# Patient Record
Sex: FEMALE | Race: WHITE | NOT HISPANIC OR LATINO | Employment: UNEMPLOYED | URBAN - METROPOLITAN AREA
[De-identification: names, ages, dates, MRNs, and addresses within clinical notes are randomized per-mention and may not be internally consistent; named-entity substitution may affect disease eponyms.]

---
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
## 2023-01-03 ENCOUNTER — TELEPHONE (OUTPATIENT)
Dept: OBGYN CLINIC | Facility: HOSPITAL | Age: 18
End: 2023-01-03

## 2023-01-03 NOTE — TELEPHONE ENCOUNTER
Caller: Patient    Doctor/Office: Podiatry    Call regarding :  Podiatry     Call was transferred to: [phone]

## 2023-01-05 ENCOUNTER — APPOINTMENT (OUTPATIENT)
Dept: RADIOLOGY | Facility: MEDICAL CENTER | Age: 18
End: 2023-01-05

## 2023-01-05 ENCOUNTER — OFFICE VISIT (OUTPATIENT)
Dept: PODIATRY | Facility: CLINIC | Age: 18
End: 2023-01-05

## 2023-01-05 VITALS
DIASTOLIC BLOOD PRESSURE: 86 MMHG | HEART RATE: 106 BPM | SYSTOLIC BLOOD PRESSURE: 121 MMHG | HEIGHT: 62 IN | BODY MASS INDEX: 49.02 KG/M2 | WEIGHT: 266.4 LBS

## 2023-01-05 DIAGNOSIS — G89.18 POST-OP PAIN: ICD-10-CM

## 2023-01-05 DIAGNOSIS — M79.672 LEFT FOOT PAIN: Primary | ICD-10-CM

## 2023-01-05 RX ORDER — BUSPIRONE HYDROCHLORIDE 10 MG/1
10 TABLET ORAL 3 TIMES DAILY
COMMUNITY
Start: 2022-11-28

## 2023-01-05 RX ORDER — TRAZODONE HYDROCHLORIDE 100 MG/1
100-200 TABLET ORAL
COMMUNITY
Start: 2022-12-20

## 2023-01-05 NOTE — LETTER
January 6, 2023     Patient: Pat Thomas  YOB: 2005  Date of Visit: 1/5/2023      To Whom it May Concern:    Pat Thomas is under my professional care  Migue Bradley was seen in my office on 1/5/2023  Migue Bradley will be out of school until further notice  If you have any questions or concerns, please don't hesitate to call           Sincerely,          Santi Osorio DPM        CC: No Recipients

## 2023-01-05 NOTE — PROGRESS NOTES
Assessment/Plan:    No problem-specific Assessment & Plan notes found for this encounter  Diagnoses and all orders for this visit:    Left foot pain  -     X-ray foot left 3+ views    Post-op pain    Other orders  -     busPIRone (BUSPAR) 10 mg tablet; Take 10 mg by mouth 3 (three) times a day  -     traZODone (DESYREL) 100 mg tablet; Take 100-200 mg by mouth daily at bedtime        -X-rays 3 views taken reviewed of the left foot weightbearing show no loss of fixation but there is continued gap which of the first, second intermetatarsal area  -She is having severe pain and is 3 months status post removal of hardware and stabilization of the intercuneiform area, there is no bony bridging in the intercuneiform area whatsoever  - I will send her for second opinion with derrek Turner and I believe that the neck step in her treatment would be the addition of a bone stimulatord to increase the consolidation intercuneiform area, if this does not work over the next couple months, we may need to attempt revisional fusion of the intercuneiform area  Subjective:      Patient ID: Janet Hall is a 21 y o  female  Patient states for evaluation and management of her left foot pain  She states that she is going to have issues graduating because she has difficulty learning and has run out of time at home  She is having severe pain with ambulation, she presents today with a cam boot and use of a cane  She is complaining of pain in between her first and second metatarsal       The following portions of the patient's history were reviewed and updated as appropriate: allergies, current medications, past family history, past medical history, past social history, past surgical history and problem list     Review of Systems   Constitutional: Negative for chills and fever  HENT: Negative for ear pain and sore throat  Eyes: Negative for pain and visual disturbance     Respiratory: Negative for cough and shortness of breath  Cardiovascular: Negative for chest pain and palpitations  Gastrointestinal: Negative for abdominal pain and vomiting  Genitourinary: Negative for dysuria and hematuria  Musculoskeletal: Negative for arthralgias and back pain  Skin: Negative for color change and rash  Neurological: Negative for seizures and syncope  All other systems reviewed and are negative  Objective:      /86 (BP Location: Left arm, Patient Position: Sitting, Cuff Size: Adult)   Pulse (!) 106   Ht 5' 2" (1 575 m)   Wt 121 kg (266 lb 6 4 oz)   BMI 48 73 kg/m²          Physical Exam  Vitals reviewed  Constitutional:       Appearance: Normal appearance  She is obese  HENT:      Head: Normocephalic and atraumatic  Mouth/Throat:      Mouth: Mucous membranes are moist    Eyes:      Pupils: Pupils are equal, round, and reactive to light  Cardiovascular:      Pulses: Normal pulses  Pulmonary:      Effort: Pulmonary effort is normal    Musculoskeletal:         General: Swelling and tenderness present  Comments: There is palpation in the intercuneiform area and there is continued swelling of this area as well  She has pain with palpation between the medial and lateral cuneiform and also between the first and second metatarsal    Skin:     Capillary Refill: Capillary refill takes less than 2 seconds  Neurological:      General: No focal deficit present  Mental Status: She is alert and oriented to person, place, and time  Mental status is at baseline

## 2023-01-06 ENCOUNTER — TELEPHONE (OUTPATIENT)
Dept: PODIATRY | Facility: CLINIC | Age: 18
End: 2023-01-06

## 2023-01-06 NOTE — TELEPHONE ENCOUNTER
Caller: Patient    Doctor: Gracy Teran    Reason for call: Needs new school note saying that she is not to go to school  till further notice      Call back#: [phone]  Note will be picked up by parent

## 2023-02-07 ENCOUNTER — OFFICE VISIT (OUTPATIENT)
Dept: PODIATRY | Facility: CLINIC | Age: 18
End: 2023-02-07

## 2023-02-07 VITALS — BODY MASS INDEX: 48.95 KG/M2 | HEIGHT: 62 IN | WEIGHT: 266 LBS

## 2023-02-07 DIAGNOSIS — G89.18 POST-OP PAIN: Primary | ICD-10-CM

## 2023-02-07 DIAGNOSIS — M79.672 PAIN IN LEFT FOOT: ICD-10-CM

## 2023-02-07 RX ORDER — VILAZODONE HYDROCHLORIDE 20 MG/1
20 TABLET ORAL DAILY
COMMUNITY
Start: 2023-01-17

## 2023-02-07 NOTE — PROGRESS NOTES
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      PLAN:  · I reviewed clinical exam and radiographic imaging (XR) with patient in detail today  I have discussed with the patient the pathophysiology of this diagnosis and reviewed how the examination correlates with this diagnosis  · I agree with Dr Alis Galeano of intercuneiform delayed union  I also agree with pursuing bone stimulator to assist with osseous bridging at this time prior to attempting revision surgery  · I additionally recommended arch binder and tall CAM boot to further stabilize and offload the area  · F/u with Dr Nura Chambers in one week      Subjective:      Patient ID: Brian Bello is a 21 y o  female  Patient presents for left foot pain after lisfranc injury s/p multiple surgeries  ORIF then [facility] then fusion by Dr Nura Chambers  Notes that she has pain with ambulation located btwn the 1/2 metatarsals  Has been walking in low CAM boot with cane  Patient presents for 2nd opinion on treatment  The following portions of the patient's history were reviewed and updated as appropriate: allergies, current medications, past family history, past medical history, past social history, past surgical history and problem list     Review of Systems   Constitutional: Negative for chills and fever     HENT: Negative for ear pain and sore throat  Eyes: Negative for pain and visual disturbance  Respiratory: Negative for cough and shortness of breath  Cardiovascular: Negative for chest pain and palpitations  Gastrointestinal: Negative for abdominal pain and vomiting  Genitourinary: Negative for dysuria and hematuria  Musculoskeletal: Negative for arthralgias and back pain  Skin: Negative for color change and rash  Neurological: Negative for seizures and syncope  All other systems reviewed and are negative  Objective:      Ht 5' 2" (1 575 m)   Wt 121 kg (266 lb)   BMI 48 65 kg/m²          Physical Exam  Vitals reviewed  Constitutional:       Appearance: Normal appearance  She is obese  HENT:      Head: Normocephalic and atraumatic  Mouth/Throat:      Mouth: Mucous membranes are moist    Eyes:      Pupils: Pupils are equal, round, and reactive to light  Cardiovascular:      Pulses: Normal pulses  Pulmonary:      Effort: Pulmonary effort is normal    Musculoskeletal:         General: Swelling and tenderness (Dorsal left foot over 1/2 TMTJ  ) present  Comments: There is pain with ROM 1st and 2nd rays at space btwn medial and intermediate cuneiforms  Skin:     Capillary Refill: Capillary refill takes less than 2 seconds  Neurological:      General: No focal deficit present  Mental Status: She is alert and oriented to person, place, and time  Mental status is at baseline

## 2023-02-13 ENCOUNTER — OFFICE VISIT (OUTPATIENT)
Dept: PODIATRY | Facility: CLINIC | Age: 18
End: 2023-02-13

## 2023-02-13 ENCOUNTER — APPOINTMENT (OUTPATIENT)
Dept: RADIOLOGY | Facility: MEDICAL CENTER | Age: 18
End: 2023-02-13

## 2023-02-13 VITALS — HEIGHT: 62 IN | WEIGHT: 266 LBS | BODY MASS INDEX: 48.95 KG/M2

## 2023-02-13 DIAGNOSIS — M79.672 PAIN IN LEFT FOOT: ICD-10-CM

## 2023-02-13 DIAGNOSIS — S92.902K: ICD-10-CM

## 2023-02-13 DIAGNOSIS — M79.672 PAIN IN LEFT FOOT: Primary | ICD-10-CM

## 2023-02-13 NOTE — PROGRESS NOTES
Assessment/Plan:    No problem-specific Assessment & Plan notes found for this encounter  Diagnoses and all orders for this visit:    Pain in left foot  -     X-ray foot left 3+ views; Future  -     Osteogenesic Stimulator    Fracture, foot, left, with nonunion, subsequent encounter  -     Osteogenesic Stimulator      -X-rays 3 views taken reviewed of the left foot and do show intact fixation and good fusion across first TMTJ and second TMT J  There is continued gap which is along the intercuneiform area with appearance of mild osseous bridging between the first and second metatarsal  - From since the purposes she has had a nonunion of her intercuneiform area, we will obtain a bone stimulator due to the continued intercuneiform instability, she is to continue cam boot, continue cane  - We will attempt to fuse is conservative with bone stimulator  - She is continuing to smoke, advised cessation, and we will have to attempt a repeat fusion intercuneiform area if she does not have decreased pain and consolidation with use of bone stimulator    Subjective:      Patient ID: Oswald William is a 21 y o  female  Patient Amy Rivera for evaluation management of her continued left foot pain, she is multiple months status post Lisfranc intervention with 3 surgical interventions including removal of hardware with attempted intercuneiform fusion  She presents today in boot with ambulation with cane  Having severe pain with ambulation unable to walk without boot      The following portions of the patient's history were reviewed and updated as appropriate: allergies, current medications, past family history, past medical history, past social history, past surgical history and problem list     Review of Systems   Constitutional: Negative for chills and fever  HENT: Negative for ear pain and sore throat  Eyes: Negative for pain and visual disturbance  Respiratory: Negative for cough and shortness of breath  Cardiovascular: Negative for chest pain and palpitations  Gastrointestinal: Negative for abdominal pain and vomiting  Genitourinary: Negative for dysuria and hematuria  Musculoskeletal: Negative for arthralgias and back pain  Skin: Negative for color change and rash  Neurological: Negative for seizures and syncope  All other systems reviewed and are negative  Objective:      Ht 5' 2" (1 575 m)   Wt 121 kg (266 lb)   BMI 48 65 kg/m²          Physical Exam  Vitals reviewed  Constitutional:       Appearance: Normal appearance  She is obese  HENT:      Head: Normocephalic and atraumatic  Nose: Nose normal       Mouth/Throat:      Mouth: Mucous membranes are moist    Eyes:      Pupils: Pupils are equal, round, and reactive to light  Cardiovascular:      Pulses: Normal pulses  Pulmonary:      Effort: Pulmonary effort is normal    Musculoskeletal:         General: Swelling and tenderness present  Comments: TTP with ROM of the medial column  Tenderness palpation along the cuneiform area  Skin:     Capillary Refill: Capillary refill takes less than 2 seconds  Neurological:      General: No focal deficit present  Mental Status: She is alert and oriented to person, place, and time  Mental status is at baseline

## 2023-02-28 ENCOUNTER — TELEPHONE (OUTPATIENT)
Dept: PAIN MEDICINE | Facility: CLINIC | Age: 18
End: 2023-02-28

## 2023-03-26 ENCOUNTER — HOSPITAL ENCOUNTER (EMERGENCY)
Facility: HOSPITAL | Age: 18
Discharge: HOME | End: 2023-03-26
Attending: EMERGENCY MEDICINE

## 2023-03-26 ENCOUNTER — APPOINTMENT (EMERGENCY)
Dept: RADIOLOGY | Facility: HOSPITAL | Age: 18
End: 2023-03-26

## 2023-03-26 VITALS
SYSTOLIC BLOOD PRESSURE: 122 MMHG | BODY MASS INDEX: 48.79 KG/M2 | RESPIRATION RATE: 26 BRPM | DIASTOLIC BLOOD PRESSURE: 63 MMHG | TEMPERATURE: 97.6 F | OXYGEN SATURATION: 99 % | WEIGHT: 266.76 LBS | HEART RATE: 89 BPM

## 2023-03-26 DIAGNOSIS — J45.901 ACUTE ASTHMA EXACERBATION: Primary | ICD-10-CM

## 2023-03-26 LAB
ALBUMIN SERPL BCP-MCNC: 4.1 G/DL (ref 3.5–5)
ALP SERPL-CCNC: 57 U/L (ref 34–104)
ALT SERPL W P-5'-P-CCNC: 15 U/L (ref 7–52)
ANION GAP SERPL CALCULATED.3IONS-SCNC: 11 MMOL/L (ref 4–13)
AST SERPL W P-5'-P-CCNC: 14 U/L (ref 13–39)
BASOPHILS # BLD AUTO: 0.05 THOUSANDS/ÂΜL (ref 0–0.1)
BASOPHILS NFR BLD AUTO: 1 % (ref 0–1)
BILIRUB SERPL-MCNC: 0.27 MG/DL (ref 0.2–1)
BUN SERPL-MCNC: 15 MG/DL (ref 5–25)
CALCIUM SERPL-MCNC: 9.3 MG/DL (ref 8.4–10.2)
CARDIAC TROPONIN I PNL SERPL HS: <2 NG/L (ref 8–18)
CHLORIDE SERPL-SCNC: 103 MMOL/L (ref 96–108)
CO2 SERPL-SCNC: 24 MMOL/L (ref 21–32)
CREAT SERPL-MCNC: 0.8 MG/DL (ref 0.6–1.3)
D DIMER PPP FEU-MCNC: 0.37 UG/ML FEU
EOSINOPHIL # BLD AUTO: 0.09 THOUSAND/ÂΜL (ref 0–0.61)
EOSINOPHIL NFR BLD AUTO: 1 % (ref 0–6)
ERYTHROCYTE [DISTWIDTH] IN BLOOD BY AUTOMATED COUNT: 13 % (ref 11.6–15.1)
GFR SERPL CREATININE-BSD FRML MDRD: 107 ML/MIN/1.73SQ M
GLUCOSE SERPL-MCNC: 103 MG/DL (ref 65–140)
HCG SERPL QL: NEGATIVE
HCT VFR BLD AUTO: 43.8 % (ref 34.8–46.1)
HGB BLD-MCNC: 14.8 G/DL (ref 11.5–15.4)
IMM GRANULOCYTES # BLD AUTO: 0.03 THOUSAND/UL (ref 0–0.2)
IMM GRANULOCYTES NFR BLD AUTO: 0 % (ref 0–2)
LYMPHOCYTES # BLD AUTO: 3.08 THOUSANDS/ÂΜL (ref 0.6–4.47)
LYMPHOCYTES NFR BLD AUTO: 33 % (ref 14–44)
MCH RBC QN AUTO: 29.7 PG (ref 26.8–34.3)
MCHC RBC AUTO-ENTMCNC: 33.8 G/DL (ref 31.4–37.4)
MCV RBC AUTO: 88 FL (ref 82–98)
MONOCYTES # BLD AUTO: 0.67 THOUSAND/ÂΜL (ref 0.17–1.22)
MONOCYTES NFR BLD AUTO: 7 % (ref 4–12)
NEUTROPHILS # BLD AUTO: 5.35 THOUSANDS/ÂΜL (ref 1.85–7.62)
NEUTS SEG NFR BLD AUTO: 58 % (ref 43–75)
NRBC BLD AUTO-RTO: 0 /100 WBCS
PLATELET # BLD AUTO: 371 THOUSANDS/UL (ref 149–390)
PMV BLD AUTO: 9.7 FL (ref 8.9–12.7)
POTASSIUM SERPL-SCNC: 3.5 MMOL/L (ref 3.5–5.3)
PROT SERPL-MCNC: 7.2 G/DL (ref 6.4–8.4)
RBC # BLD AUTO: 4.98 MILLION/UL (ref 3.81–5.12)
SODIUM SERPL-SCNC: 138 MMOL/L (ref 135–147)
WBC # BLD AUTO: 9.27 THOUSAND/UL (ref 4.31–10.16)

## 2023-03-26 RX ORDER — PREDNISONE 20 MG/1
40 TABLET ORAL DAILY
Qty: 8 TABLET | Refills: 0 | Status: SHIPPED | OUTPATIENT
Start: 2023-03-27 | End: 2023-03-31

## 2023-03-26 RX ORDER — SODIUM CHLORIDE FOR INHALATION 0.9 %
3 VIAL, NEBULIZER (ML) INHALATION ONCE
Status: COMPLETED | OUTPATIENT
Start: 2023-03-26 | End: 2023-03-26

## 2023-03-26 RX ORDER — PREDNISONE 20 MG/1
40 TABLET ORAL ONCE
Status: COMPLETED | OUTPATIENT
Start: 2023-03-26 | End: 2023-03-26

## 2023-03-26 RX ADMIN — IPRATROPIUM BROMIDE 1 MG: 0.5 SOLUTION RESPIRATORY (INHALATION) at 03:43

## 2023-03-26 RX ADMIN — Medication 3 ML: at 03:43

## 2023-03-26 RX ADMIN — ALBUTEROL SULFATE 10 MG: 2.5 SOLUTION RESPIRATORY (INHALATION) at 03:43

## 2023-03-26 RX ADMIN — IPRATROPIUM BROMIDE: 0.5 SOLUTION RESPIRATORY (INHALATION) at 03:43

## 2023-03-26 RX ADMIN — PREDNISONE 40 MG: 20 TABLET ORAL at 04:55

## 2023-03-26 NOTE — DISCHARGE INSTRUCTIONS
You have been seen for dyspnea and chest discomfort  Please complete the course of prednisone as prescribed  Return to the emergency department if you develop worsening trouble breathing, chest pain or any other symptoms of concern  Please follow up with your PCP by calling the number provided

## 2023-03-26 NOTE — ED PROVIDER NOTES
History  Chief Complaint   Patient presents with   • Asthma     Pt c/o sob and chest pain, states she took her prn inhalers with no relief  States she coughed up a quarter size blood clot before coming in  [facility] Harry is a 21y o  year old female with PMH of asthma presenting to the [facility] ED for dyspnea and chest discomfort  Patient states one hour prior to arrival she had a cough and coughed up a quarter sized blood clot  Since that time, reporting generalized chest discomfort described as a squeezing sensation  Feels short of breath as well  Patient has history of asthma though feels that her current symptoms are different compared to prior asthma exacerbations  Patient reportedly in normal state of health yesterday  No reported fevers, cough or congestion  No reported sore throat, neck swelling or trouble swallowing  No history of hospitalization or intubation for asthma exacerbation previously  Patient trialed her albuterol inhaler for relief of symptoms prior to arrival  Patient denies personal or family history of DVT/PE  Denies unilateral leg pain/swelling  Not currently on estrogen-containing contraceptive medication  No recent travel  History provided by:  Patient and medical records   used: No    Asthma  Associated symptoms: chest pain, cough and shortness of breath    Associated symptoms: no abdominal pain, no congestion, no diarrhea, no fever, no nausea and no vomiting        Prior to Admission Medications   Prescriptions Last Dose Informant Patient Reported? Taking?    albuterol (Ventolin HFA) 90 mcg/act inhaler   No No   Sig: Inhale 2 puffs every 4 (four) hours as needed for wheezing or shortness of breath   busPIRone (BUSPAR) 10 mg tablet   Yes No   Sig: Take 10 mg by mouth 3 (three) times a day   medroxyPROGESTERone (DEPO-PROVERA) 150 mg/mL injection   No No   Sig: Inject 1 mL (150 mg total) into a muscle every 3 (three) months   Patient not taking: Reported on 12/5/2022   prazosin (MINIPRESS) 5 mg capsule   No No   Sig: Take 1 capsule (5 mg total) by mouth daily at bedtime   traZODone (DESYREL) 100 mg tablet   Yes No   Sig: Take 100-200 mg by mouth daily at bedtime   vilazodone (VIIBRYD) 20 mg tablet   Yes No   Sig: Take 20 mg by mouth daily      Facility-Administered Medications Last Administration Doses Remaining   medroxyPROGESTERone (DEPO-PROVERA) IM injection 150 mg None recorded 1          Past Medical History:   Diagnosis Date   • ADHD (attention deficit hyperactivity disorder)    • Anxiety    • Asthma    • Depression    • Environmental allergies    • PONV (postoperative nausea and vomiting)    • PTSD (post-traumatic stress disorder)        Past Surgical History:   Procedure Laterality Date   • MD ARTHRD MIDTARSL/TARSOMETATARSAL MULT/TRANSVRS Left 10/13/2022    Procedure: ARTHRODESIS / FUSION FOOT;  Surgeon: Deon Blackburn DPM;  Location: [facility];  Service: Podiatry   • MD OPEN TREATMENT TARSOMETATARSAL JOINT DISLOCATION Left 5/6/2022    Procedure: ORIF OF LEFT FOOT UTILIZING PLATES AND SCREW AS NECESSARY;  Surgeon: Deon Blackburn DPM;  Location: [facility];  Service: Podiatry   • MD REMOVAL IMPLANT DEEP Left 7/28/2022    Procedure: REMOVAL HARDWARE FOOT;  Surgeon: Deon Blackburn DPM;  Location: [facility];  Service: Podiatry       Family History   Problem Relation Age of Onset   • Autism Brother    • Diabetes type II Maternal Grandmother      I have reviewed and agree with the history as documented      E-Cigarette/Vaping   • E-Cigarette Use Current Some Day User      E-Cigarette/Vaping Substances   • Nicotine Yes    • THC No    • CBD No    • Flavoring Yes    • Other No    • Unknown No      Social History     Tobacco Use   • Smoking status: Every Day     Packs/day: 0 50     Types: Cigarettes   • Smokeless tobacco: Never   • Tobacco comments:     cutting back   Vaping Use   • Vaping Use: Some days   • Substances: Nicotine, Flavoring   Substance Use Topics   • Alcohol use: Not Currently   • Drug use: Not Currently       Review of Systems   Constitutional: Negative for chills and fever  HENT: Negative for congestion  Respiratory: Positive for cough, chest tightness and shortness of breath  Cardiovascular: Positive for chest pain  Gastrointestinal: Negative for abdominal pain, diarrhea, nausea and vomiting  Musculoskeletal: Negative for back pain  All other systems reviewed and are negative  Physical Exam  Physical Exam  Vitals and nursing note reviewed  Constitutional:       General: She is not in acute distress  Appearance: Normal appearance  She is well-developed  She is obese  She is not ill-appearing, toxic-appearing or diaphoretic  HENT:      Head: Normocephalic and atraumatic  Jaw: No trismus, tenderness or swelling  Nose: No congestion or rhinorrhea  Mouth/Throat:      Pharynx: Uvula midline  No pharyngeal swelling, oropharyngeal exudate, posterior oropharyngeal erythema or uvula swelling  Eyes:      General:         Right eye: No discharge  Left eye: No discharge  Cardiovascular:      Rate and Rhythm: Normal rate and regular rhythm  Pulmonary:      Effort: Tachypnea present  No accessory muscle usage or respiratory distress  Breath sounds: Normal breath sounds  Decreased air movement (grossly diminished) present  No stridor  No decreased breath sounds, wheezing, rhonchi or rales  Comments: Speaking in full sentences  Abdominal:      General: Bowel sounds are normal  There is no distension  Palpations: Abdomen is soft  Tenderness: There is no abdominal tenderness  There is no guarding or rebound  Musculoskeletal:      Cervical back: Normal range of motion and neck supple  No edema, erythema, rigidity or crepitus  No muscular tenderness  Right lower leg: No tenderness  No edema  Left lower leg: No tenderness  No edema     Skin:     Capillary Refill: Capillary refill takes less than 2 seconds  Findings: No rash  Neurological:      Mental Status: She is alert and oriented to person, place, and time     Psychiatric:         Mood and Affect: Mood normal          Behavior: Behavior normal          Vital Signs  ED Triage Vitals [03/26/23 0305]   Temperature Pulse Respirations Blood Pressure SpO2   97 6 °F (36 4 °C) 97 (!) 24 105/53 98 %      Temp Source Heart Rate Source Patient Position - Orthostatic VS BP Location FiO2 (%)   Temporal Monitor Lying Left arm --      Pain Score       --           Vitals:    03/26/23 0305 03/26/23 0400 03/26/23 0430   BP: 105/53 169/63 122/63   Pulse: 97 82 89   Patient Position - Orthostatic VS: Lying Lying Lying         Visual Acuity      ED Medications  Medications   albuterol inhalation solution 10 mg (10 mg Nebulization Given 3/26/23 0343)     And   ipratropium (ATROVENT) 0 02 % inhalation solution 1 mg (1 mg Nebulization Given 3/26/23 0343)     And   sodium chloride 0 9 % inhalation solution 3 mL (3 mL Nebulization Given 3/26/23 0343)   ipratropium (ATROVENT) 0 02 % inhalation solution **ADS Override Pull** (  Given 3/26/23 0343)   predniSONE tablet 40 mg (40 mg Oral Given 3/26/23 0455)       Diagnostic Studies  Results Reviewed     Procedure Component Value Units Date/Time    High Sensitivity Troponin I Random [[id number]]  (Abnormal) Collected: 03/26/23 0331    Lab Status: Final result Specimen: Blood from Arm, Right Updated: 03/26/23 0401     HS TnI random <2 ng/L     hCG, qualitative pregnancy [[id number]]  (Normal) Collected: 03/26/23 0331    Lab Status: Final result Specimen: Blood from Arm, Right Updated: 03/26/23 0400     Preg, Serum Negative    Comprehensive metabolic panel [[id number]] Collected: 03/26/23 0331    Lab Status: Final result Specimen: Blood from Arm, Right Updated: 03/26/23 0356     Sodium 138 mmol/L      Potassium 3 5 mmol/L      Chloride 103 mmol/L      CO2 24 mmol/L      ANION GAP 11 mmol/L BUN 15 mg/dL      Creatinine 0 80 mg/dL      Glucose 103 mg/dL      Calcium 9 3 mg/dL      AST 14 U/L      ALT 15 U/L      Alkaline Phosphatase 57 U/L      Total Protein 7 2 g/dL      Albumin 4 1 g/dL      Total Bilirubin 0 27 mg/dL      eGFR 107 ml/min/1 73sq m     Narrative:      [place] guidelines for Chronic Kidney Disease (CKD):   •  Stage 1 with normal or high GFR (GFR > 90 mL/min/1 73 square meters)  •  Stage 2 Mild CKD (GFR = 60-89 mL/min/1 73 square meters)  •  Stage 3A Moderate CKD (GFR = 45-59 mL/min/1 73 square meters)  •  Stage 3B Moderate CKD (GFR = 30-44 mL/min/1 73 square meters)  •  Stage 4 Severe CKD (GFR = 15-29 mL/min/1 73 square meters)  •  Stage 5 End Stage CKD (GFR <15 mL/min/1 73 square meters)  Note: GFR calculation is accurate only with a steady state creatinine    D-Dimer [[id number]]  (Normal) Collected: 03/26/23 0331    Lab Status: Final result Specimen: Blood from Arm, Right Updated: 03/26/23 0350     D-Dimer, Quant 0 37 ug/ml FEU     CBC and differential [[id number]] Collected: 03/26/23 0331    Lab Status: Final result Specimen: Blood from Arm, Right Updated: 03/26/23 0338     WBC 9 27 Thousand/uL      RBC 4 98 Million/uL      Hemoglobin 14 8 g/dL      Hematocrit 43 8 %      MCV 88 fL      MCH 29 7 pg      MCHC 33 8 g/dL      RDW 13 0 %      MPV 9 7 fL      Platelets 290 Thousands/uL      nRBC 0 /100 WBCs      Neutrophils Relative 58 %      Immat GRANS % 0 %      Lymphocytes Relative 33 %      Monocytes Relative 7 %      Eosinophils Relative 1 %      Basophils Relative 1 %      Neutrophils Absolute 5 35 Thousands/µL      Immature Grans Absolute 0 03 Thousand/uL      Lymphocytes Absolute 3 08 Thousands/µL      Monocytes Absolute 0 67 Thousand/µL      Eosinophils Absolute 0 09 Thousand/µL      Basophils Absolute 0 05 Thousands/µL                  XR chest portable   ED Interpretation by Sonny Monday, DO (03/26 0421)   No acute cardiopulmonary disease  "             Procedures  Procedures         ED Course  ED Course as of 03/26/23 0636   Gwenetta Severs Mar 26, 2023   8811 Procedure Note: EKG  Date/Time: 03/26/23 3:03 AM   Interpreted by: Luci Johnson DO  Indications / Diagnosis: Dyspnea  ECG reviewed by me, the ED Provider: yes   The EKG demonstrates:  Rhythm: normal sinus rhythm 94 BPM  Intervals: Normal MO and QT intervals  Axis: Normal axis  QRS/Blocks: Normal QRS  ST Changes: No acute ST/T waves changes  No BHARATH  No TWI    0451 Patient reassessed  She feels much better  Chest discomfort resolved  No wheezing on repeat auscultation, improved air movement  Vital signs are stable  Reviewed work-up with patient  We will plan for discharge and treatment for asthma exacerbation  CRAFFT    Flowsheet Row Most Recent Value   SBIRT (13-23 yo)    In order to provide better care to our patients, we are screening all of our patients for alcohol and drug use  Would it be okay to ask you these screening questions? Yes Filed at: 03/26/2023 0411   LUNA Initial Screen: During the past 12 months, did you:    1  Drink any alcohol (more than a few sips)? No Filed at: 03/26/2023 0411   2  Smoke any marijuana or hashish No Filed at: 03/26/2023 0411   3  Use anything else to get high? (\"anything else\" includes illegal drugs, over the counter and prescription drugs, and things that you sniff or 'dale')? No Filed at: 03/26/2023 0411                                          Medical Decision Making    21 y o  female presenting for cough, chest tightness and dyspnea  Normal oxygen saturation, mildly tachypneic though speaking in full sentences  Grossly diminished breath sounds bilaterally without overt wheezing still possibly consistent with asthma exacerbation  Will treat with DuoNeb and prednisone  Will order labs, chest x-ray to evaluate for pneumothorax, myocarditis, pulmonary embolism, arrhythmia or ACS      Reassessment: Patient feeling much better after DuoNeb " treatment  No wheezing on repeat auscultation  Much improved air movement  Reviewed labs and preliminary x-ray results with the patient  Unclear etiology of cough, possibly bloody sputum  Disposition: I have discussed with the patient our plan to discharge them from the ED and the patient is in agreement with this plan  Discharge Plan: Prescription for prednisone, continue albuterol inhaler as needed home  RTED precautions emphasized  The patient was provided a written after visit summary with strict RTED precautions  Followup: I have discussed with the patient plan to follow up with their PCP  Contact information provided in AVS     Amount and/or Complexity of Data Reviewed  Labs: ordered  Radiology: ordered and independent interpretation performed  Risk  Prescription drug management  Disposition  Final diagnoses:   Acute asthma exacerbation     Time reflects when diagnosis was documented in both MDM as applicable and the Disposition within this note     Time User Action Codes Description Comment    3/26/2023  4:51 AM Ann Lynn [[id number]] Acute asthma exacerbation       ED Disposition     ED Disposition   Discharge    Condition   Stable    Date/Time   Sun Mar 26, 2023  4:51 AM    Comment   Josephine Mckay discharge to home/self care  Follow-up Information     Follow up With Specialties Details Why [address], DO Family Medicine Schedule an appointment as soon as possible for a visit  To make appointment for reevaluation in 3-5 days   [address] [facility]  [address]  [phone]            Discharge Medication List as of 3/26/2023  4:52 AM      START taking these medications    Details   predniSONE 20 mg tablet Take 2 tablets (40 mg total) by mouth daily for 4 days Do not start before March 27, 2023 , Starting Mon 3/27/2023, Until Fri 3/31/2023, Normal         CONTINUE these medications which have NOT CHANGED    Details   albuterol (Ventolin HFA) 90 mcg/act inhaler Inhale 2 puffs every 4 (four) hours as needed for wheezing or shortness of breath, Starting Mon 11/21/2022, Normal      busPIRone (BUSPAR) 10 mg tablet Take 10 mg by mouth 3 (three) times a day, Starting Mon 11/28/2022, Historical Med      medroxyPROGESTERone (DEPO-PROVERA) 150 mg/mL injection Inject 1 mL (150 mg total) into a muscle every 3 (three) months, Starting Tue 11/8/2022, Normal      prazosin (MINIPRESS) 5 mg capsule Take 1 capsule (5 mg total) by mouth daily at bedtime, Starting Mon 11/21/2022, Normal      traZODone (DESYREL) 100 mg tablet Take 100-200 mg by mouth daily at bedtime, Starting Tue 12/20/2022, Historical Med      vilazodone (VIIBRYD) 20 mg tablet Take 20 mg by mouth daily, Starting Tue 1/17/2023, Historical Med             No discharge procedures on file      PDMP Review       Value Time User    PDMP Reviewed  Yes 9/14/2022  3:35 PM Lyle Torres DO          ED Provider  Electronically Signed by           Jai Werner DO  03/26/23 6146

## 2023-03-27 ENCOUNTER — OFFICE VISIT (OUTPATIENT)
Dept: PODIATRY | Facility: CLINIC | Age: 18
End: 2023-03-27

## 2023-03-27 ENCOUNTER — APPOINTMENT (OUTPATIENT)
Dept: RADIOLOGY | Facility: MEDICAL CENTER | Age: 18
End: 2023-03-27

## 2023-03-27 VITALS — HEIGHT: 62 IN | BODY MASS INDEX: 48.95 KG/M2 | WEIGHT: 266 LBS

## 2023-03-27 DIAGNOSIS — M79.672 PAIN IN LEFT FOOT: Primary | ICD-10-CM

## 2023-03-27 DIAGNOSIS — M79.672 PAIN IN LEFT FOOT: ICD-10-CM

## 2023-03-27 DIAGNOSIS — S92.902K: ICD-10-CM

## 2023-03-27 LAB
ATRIAL RATE: 94 BPM
P AXIS: 31 DEGREES
PR INTERVAL: 162 MS
QRS AXIS: 24 DEGREES
QRSD INTERVAL: 92 MS
QT INTERVAL: 360 MS
QTC INTERVAL: 450 MS
T WAVE AXIS: 22 DEGREES
VENTRICULAR RATE: 94 BPM

## 2023-03-27 NOTE — PROGRESS NOTES
Assessment/Plan:    No problem-specific Assessment & Plan notes found for this encounter  Diagnoses and all orders for this visit:    Pain in left foot  -     X-ray foot left 3+ views; Future    Fracture, foot, left, with nonunion, subsequent encounter      -X-rays 3 views taken reviewed do show mild osseous bridging across the intercuneiform area, fracture gap remains less than 1 mm  - Normal postoperative appearance but elongated postoperative recovery, she may DC her boot, may DC cane, return to shoes with pain tolerance permitting  - We will attempt to call both with her company to allow use of this device for the full 3 months, with minimal osseous bridging across the intercuneiform area this would still be very beneficial for her to prevent long-term complication    Subjective:      Patient ID: Jennifer Begum is a 21 y o  female  Patient presents for evaluation management of her left foot pain, she is in cam boot and ambulating with crutch, she states that she feels fine in the boot, she is actually having minimal pain when she is outside the boot, she was able to use a bone cement for 10 days before deciding stop working  The following portions of the patient's history were reviewed and updated as appropriate: allergies, current medications, past family history, past medical history, past social history, past surgical history and problem list     Review of Systems   Constitutional: Negative for chills and fever  HENT: Negative for ear pain and sore throat  Eyes: Negative for pain and visual disturbance  Respiratory: Negative for cough and shortness of breath  Cardiovascular: Negative for chest pain and palpitations  Gastrointestinal: Negative for abdominal pain and vomiting  Genitourinary: Negative for dysuria and hematuria  Musculoskeletal: Negative for arthralgias and back pain  Skin: Negative for color change and rash  Neurological: Negative for seizures and syncope  "  All other systems reviewed and are negative  Objective:      Ht 5' 2\" (1 575 m)   Wt 121 kg (266 lb)   BMI 48 65 kg/m²          Physical Exam  Musculoskeletal:      Comments:  There is actually minimal pain along the intercuneiform area, there is pain with palpation along the screw head of the second TMT J          "

## 2023-06-21 NOTE — RESPIRATORY THERAPY NOTE
1610 called to ER for conscious sedation on pt for LT foot  Suction, ambu, capnography with 2 l/m nc placed on pt  Pulse ox 99% throughout entire procedure, RR 16-20, CO2 36-40  No complications, pt did not need any respiratory assistance  1705 pt alert, speaking, and  oxygen removed   Therapist cleared to go show

## 2023-06-22 ENCOUNTER — OFFICE VISIT (OUTPATIENT)
Dept: PODIATRY | Facility: CLINIC | Age: 18
End: 2023-06-22
Payer: COMMERCIAL

## 2023-06-22 ENCOUNTER — APPOINTMENT (OUTPATIENT)
Dept: RADIOLOGY | Facility: MEDICAL CENTER | Age: 18
End: 2023-06-22
Payer: COMMERCIAL

## 2023-06-22 VITALS
HEIGHT: 62 IN | WEIGHT: 266 LBS | HEART RATE: 93 BPM | BODY MASS INDEX: 48.95 KG/M2 | DIASTOLIC BLOOD PRESSURE: 63 MMHG | SYSTOLIC BLOOD PRESSURE: 122 MMHG

## 2023-06-22 DIAGNOSIS — M79.672 LEFT FOOT PAIN: ICD-10-CM

## 2023-06-22 DIAGNOSIS — M79.672 LEFT FOOT PAIN: Primary | ICD-10-CM

## 2023-06-22 PROCEDURE — 73630 X-RAY EXAM OF FOOT: CPT

## 2023-06-22 PROCEDURE — 99213 OFFICE O/P EST LOW 20 MIN: CPT | Performed by: PODIATRIST

## 2023-06-22 NOTE — PROGRESS NOTES
Assessment/Plan:    No problem-specific Assessment & Plan notes found for this encounter  Diagnoses and all orders for this visit:    Left foot pain  -     X-ray foot left 3+ views; Future  -     CT lower extremity wo contrast left; Future  -     Orthotics B/L      -Stomach continued pain, unfortunately she only was able to use a bone stimulator for 20 treatments before it broke, she did not receive another bone stimulator  - I have wrote him a prescription for orthotics and hopefully rigid shoes to prevent some range of motion through this painful area  - We will obtain a CT to evaluate the union of the midfoot    Subjective:      Patient ID: Sourav Flores is a 21 y o  female  Patient presents for evaluation management of left foot, she still having significant pain that is interfering with her activities of daily living, she is having difficulty finding a job that does not allow her to stand on her feet and it hurts her to stand as the day goes on more and more more, she feels as though her pain relief has maximized  She states that as though she feels a sharp stabbing pain in her foot and that it hurts for couple days afterwards  She states that she has difficulty wearing closed toed shoes and sandals with the best for her      The following portions of the patient's history were reviewed and updated as appropriate: allergies, current medications, past family history, past medical history, past social history, past surgical history and problem list     Review of Systems   Constitutional: Negative for chills and fever  HENT: Negative for ear pain and sore throat  Eyes: Negative for pain and visual disturbance  Respiratory: Negative for cough and shortness of breath  Cardiovascular: Negative for chest pain and palpitations  Gastrointestinal: Negative for abdominal pain and vomiting  Genitourinary: Negative for dysuria and hematuria     Musculoskeletal: Negative for arthralgias and back "pain    Skin: Negative for color change and rash  Neurological: Negative for seizures and syncope  All other systems reviewed and are negative  Objective:      /63 (BP Location: Right arm, Patient Position: Sitting, Cuff Size: Extra-Large)   Pulse 93   Ht 5' 2\" (1 575 m)   Wt 121 kg (266 lb)   BMI 48 65 kg/m²          Physical Exam  Musculoskeletal:      Comments:  There is actually minimal intercuneiform area pain, the pain seems to be more isolated to a prominent screw at the second TMT J and somewhat pain with range of motion of the second TMT J          "

## 2023-07-24 ENCOUNTER — HOSPITAL ENCOUNTER (OUTPATIENT)
Dept: CT IMAGING | Facility: HOSPITAL | Age: 18
Discharge: HOME | End: 2023-07-24
Attending: PODIATRIST
Payer: COMMERCIAL

## 2023-07-24 DIAGNOSIS — M79.672 LEFT FOOT PAIN: ICD-10-CM

## 2023-07-24 PROCEDURE — 73700 CT LOWER EXTREMITY W/O DYE: CPT

## 2023-07-24 PROCEDURE — G1004 CDSM NDSC: HCPCS

## 2023-07-31 ENCOUNTER — HOSPITAL ENCOUNTER (EMERGENCY)
Facility: HOSPITAL | Age: 18
End: 2023-08-01
Attending: EMERGENCY MEDICINE
Payer: COMMERCIAL

## 2023-07-31 ENCOUNTER — CLINICAL SUPPORT (OUTPATIENT)
Dept: FAMILY MEDICINE CLINIC | Facility: CLINIC | Age: 18
End: 2023-07-31
Payer: COMMERCIAL

## 2023-07-31 DIAGNOSIS — Z11.1 PPD SCREENING TEST: Primary | ICD-10-CM

## 2023-07-31 DIAGNOSIS — R45.851 SUICIDAL IDEATIONS: Primary | ICD-10-CM

## 2023-07-31 DIAGNOSIS — R45.851 DEPRESSION WITH SUICIDAL IDEATION: ICD-10-CM

## 2023-07-31 DIAGNOSIS — F32.A DEPRESSION WITH SUICIDAL IDEATION: ICD-10-CM

## 2023-07-31 LAB
AMPHETAMINES SERPL QL SCN: NEGATIVE
BARBITURATES UR QL: NEGATIVE
BENZODIAZ UR QL: NEGATIVE
COCAINE UR QL: NEGATIVE
ETHANOL EXG-MCNC: 0 MG/DL
EXT PREGNANCY TEST URINE: NEGATIVE
EXT. CONTROL: NORMAL
METHADONE UR QL: NEGATIVE
OPIATES UR QL SCN: NEGATIVE
OXYCODONE+OXYMORPHONE UR QL SCN: NEGATIVE
PCP UR QL: NEGATIVE
THC UR QL: POSITIVE

## 2023-07-31 PROCEDURE — 82075 ASSAY OF BREATH ETHANOL: CPT

## 2023-07-31 PROCEDURE — 99284 EMERGENCY DEPT VISIT MOD MDM: CPT

## 2023-07-31 PROCEDURE — 80307 DRUG TEST PRSMV CHEM ANLYZR: CPT

## 2023-07-31 PROCEDURE — 86580 TB INTRADERMAL TEST: CPT | Performed by: FAMILY MEDICINE

## 2023-07-31 PROCEDURE — 81025 URINE PREGNANCY TEST: CPT

## 2023-07-31 RX ORDER — DESVENLAFAXINE SUCCINATE 50 MG/1
50 TABLET, EXTENDED RELEASE ORAL DAILY
Status: DISCONTINUED | OUTPATIENT
Start: 2023-08-01 | End: 2023-07-31

## 2023-07-31 RX ORDER — LANOLIN ALCOHOL/MO/W.PET/CERES
6 CREAM (GRAM) TOPICAL
Status: DISCONTINUED | OUTPATIENT
Start: 2023-07-31 | End: 2023-07-31

## 2023-07-31 RX ORDER — MIRTAZAPINE 15 MG/1
30 TABLET, FILM COATED ORAL
Status: DISCONTINUED | OUTPATIENT
Start: 2023-07-31 | End: 2023-08-01 | Stop reason: HOSPADM

## 2023-07-31 RX ADMIN — MIRTAZAPINE 30 MG: 15 TABLET, FILM COATED ORAL at 21:16

## 2023-07-31 NOTE — ED NOTES
Patient okay for virtual observation and Q 15 minute checks per provider order at this time.      Deedee Marie RN  07/31/23 1955

## 2023-07-31 NOTE — ED NOTES
Patient changed into paper scrubs. Patient belongings bagged at this time.      Kobe Bell RN  07/31/23 0604

## 2023-07-31 NOTE — ED PROVIDER NOTES
History  Chief Complaint   Patient presents with   • Psychiatric Evaluation     Patient states that she "does not trust herself" and has thoughts of self harm/suicide; states that she "blacks out" and wakes up with cuts on herself; looking for inpatient treatment; denies HI     Jh Burnette is a 22-year-old female with a history of anxiety, depression, PTSD, presenting for evaluation of depression, suicidal thoughts, suicidal plan. She describes that over recent days, she has been having feelings of depression, she cannot pinpoint the reason why she is having these feelings. She also describes episodes of "blacking out" and waking with cuts on her forearms and legs. She believes she is harming herself, she has a history of prior self-harm. She describes suicidal ideations and a plan in which she would overdose on her psychiatric medications, she takes [place] and BuSpar, she has a history of prior "overdose" on Zoloft. She denies any homicidal thoughts or ideations. Denies any visual or auditory hallucinations. She admits to occasionally smoking marijuana, but denies any other drug or alcohol use. She has previous psychiatric admissions. She is requesting a 201 psychiatric admission today for these suicidal ideations. History provided by:  Patient   used: No        Prior to Admission Medications   Prescriptions Last Dose Informant Patient Reported? Taking?    albuterol (Ventolin HFA) 90 mcg/act inhaler   No No   Sig: Inhale 2 puffs every 4 (four) hours as needed for wheezing or shortness of breath   busPIRone (BUSPAR) 10 mg tablet   Yes No   Sig: Take 10 mg by mouth 3 (three) times a day   medroxyPROGESTERone (DEPO-PROVERA) 150 mg/mL injection   No No   Sig: Inject 1 mL (150 mg total) into a muscle every 3 (three) months   Patient not taking: Reported on 12/5/2022   prazosin (MINIPRESS) 5 mg capsule   No No   Sig: Take 1 capsule (5 mg total) by mouth daily at bedtime   traZODone (DESYREL) 100 mg tablet   Yes No   Sig: Take 100-200 mg by mouth daily at bedtime   vilazodone (VIIBRYD) 20 mg tablet   Yes No   Sig: Take 20 mg by mouth daily      Facility-Administered Medications: None       Past Medical History:   Diagnosis Date   • ADHD (attention deficit hyperactivity disorder)    • Anxiety    • Asthma    • Bipolar disorder (HCC)    • Depression    • Environmental allergies    • PONV (postoperative nausea and vomiting)    • PTSD (post-traumatic stress disorder)        Past Surgical History:   Procedure Laterality Date   • [place] ARTHRD MIDTARSL/TARSOMETATARSAL MULT/TRANSVRS Left 10/13/2022    Procedure: ARTHRODESIS / FUSION FOOT;  Surgeon: Azalia Agustin DPM;  Location: [facility];  Service: Podiatry   • [place] OPEN TREATMENT TARSOMETATARSAL JOINT DISLOCATION Left 5/6/2022    Procedure: ORIF OF LEFT FOOT UTILIZING PLATES AND SCREW AS NECESSARY;  Surgeon: Azalia Agustin DPM;  Location: [facility];  Service: Podiatry   • [place] REMOVAL IMPLANT DEEP Left 7/28/2022    Procedure: REMOVAL HARDWARE FOOT;  Surgeon: Azalia Agustin DPM;  Location: [facility];  Service: Podiatry       Family History   Problem Relation Age of Onset   • Autism Brother    • Diabetes type II Maternal Grandmother      I have reviewed and agree with the history as documented.     E-Cigarette/Vaping   • E-Cigarette Use Current Some Day User      E-Cigarette/Vaping Substances   • Nicotine Yes    • THC No    • CBD No    • Flavoring Yes    • Other No    • Unknown No      Social History     Tobacco Use   • Smoking status: Every Day     Packs/day: 0.50     Types: Cigarettes   • Smokeless tobacco: Never   • Tobacco comments:     cutting back   Vaping Use   • Vaping Use: Some days   • Substances: Nicotine, Flavoring   Substance Use Topics   • Alcohol use: Not Currently   • Drug use: Yes     Types: Marijuana     Comment: patient states about 1-2 times a month        Review of Systems   Constitutional: Negative for chills and fever. HENT: Negative for ear pain and sore throat. Eyes: Negative for pain and visual disturbance. Respiratory: Negative for cough and shortness of breath. Cardiovascular: Negative for chest pain and palpitations. Gastrointestinal: Negative for abdominal pain and vomiting. Genitourinary: Negative for dysuria and hematuria. Musculoskeletal: Negative for arthralgias and back pain. Skin: Negative for color change and rash. Neurological: Negative for seizures and syncope. Psychiatric/Behavioral: Positive for suicidal ideas. All other systems reviewed and are negative. Physical Exam  ED Triage Vitals [07/31/23 1909]   Temperature Pulse Respirations Blood Pressure SpO2   98 °F (36.7 °C) 98 18 125/79 98 %      Temp Source Heart Rate Source Patient Position - Orthostatic VS BP Location FiO2 (%)   Temporal Monitor -- -- --      Pain Score       No Pain             Orthostatic Vital Signs  Vitals:    07/31/23 1909   BP: 125/79   Pulse: 98       Physical Exam  Vitals and nursing note reviewed. Constitutional:       General: She is not in acute distress. Appearance: She is obese. HENT:      Head: Normocephalic and atraumatic. Mouth/Throat:      Mouth: Mucous membranes are moist.      Pharynx: Oropharynx is clear. Eyes:      General: No scleral icterus. Conjunctiva/sclera: Conjunctivae normal.   Cardiovascular:      Rate and Rhythm: Normal rate and regular rhythm. Heart sounds: Normal heart sounds. Pulmonary:      Effort: Pulmonary effort is normal. No respiratory distress. Breath sounds: Normal breath sounds. Abdominal:      General: Abdomen is flat. There is no distension. Tenderness: There is no abdominal tenderness. Musculoskeletal:         General: No tenderness or signs of injury. Cervical back: Neck supple. No rigidity. Skin:     General: Skin is warm. Coloration: Skin is not jaundiced. Findings: No erythema or rash.    Neurological: General: No focal deficit present. Mental Status: She is alert. Mental status is at baseline. Psychiatric:         Mood and Affect: Mood is depressed. Behavior: Behavior is cooperative. Thought Content: Thought content is not delusional. Thought content includes suicidal ideation. Thought content does not include homicidal ideation. Thought content includes suicidal plan. Thought content does not include homicidal plan. ED Medications  Medications   mirtazapine (REMERON) tablet 30 mg (30 mg Oral Given 7/31/23 2116)       Diagnostic Studies  Results Reviewed     Procedure Component Value Units Date/Time    Rapid drug screen, urine [[id number]]  (Abnormal) Collected: 07/31/23 1943    Lab Status: Final result Specimen: Urine, Clean Catch Updated: 07/31/23 2011     Amph/Meth UR Negative     Barbiturate Ur Negative     Benzodiazepine Urine Negative     Cocaine Urine Negative     Methadone Urine Negative     Opiate Urine Negative     PCP Ur Negative     THC Urine Positive     Oxycodone Urine Negative    Narrative:      Presumptive report. If requested, specimen will be sent to reference lab for confirmation. FOR MEDICAL PURPOSES ONLY. IF CONFIRMATION NEEDED PLEASE CONTACT THE LAB WITHIN 5 DAYS.     Drug Screen Cutoff Levels:  AMPHETAMINE/METHAMPHETAMINES  1000 ng/mL  BARBITURATES     200 ng/mL  BENZODIAZEPINES     200 ng/mL  COCAINE      300 ng/mL  METHADONE      300 ng/mL  OPIATES      300 ng/mL  PHENCYCLIDINE     25 ng/mL  THC       50 ng/mL  OXYCODONE      100 ng/mL    POCT pregnancy, urine [[id number]]  (Normal) Resulted: 07/31/23 1943    Lab Status: Final result Updated: 07/31/23 1943     EXT Preg Test, Ur Negative     Control Valid    POCT alcohol breath test [[id number]]  (Normal) Resulted: 07/31/23 1936    Lab Status: Final result Updated: 07/31/23 1936     EXTBreath Alcohol 0.00                 No orders to display         Procedures  Procedures      ED Course  ED Course as of 08/01/23 0006   Mon Jul 31, 2023   2221 Patient to be admitted on a psychiatric 201 admission after further discussion with crisis worker, awaiting paperwork to be faxed         LUNA    Flowsheet Row Most Recent Value   LUNA Initial Screen: During the past 12 months, did you:    1. Drink any alcohol (more than a few sips)? No Filed at: 07/31/2023 1917   2. Smoke any marijuana or hashish Yes Filed at: 07/31/2023 1917   3. Use anything else to get high? ("anything else" includes illegal drugs, over the counter and prescription drugs, and things that you sniff or 'dale')? No Filed at: 07/31/2023 [address] is a 64-year-old female with a history of anxiety, depression, PTSD, presenting for evaluation of depression, suicidal thoughts, suicidal plan. Patient describes plan of suicide which she would overdose on her prescribed psychiatric medications, she has a history of prior attempted suicide in a similar manner. Denied homicidal ideations, denied hallucinations. Patient was hopeful for psychiatric admission. Based off of patient's describes suicidal ideations, I agreed that she likely would be candidate for 201 psychiatric admission. After discussion with crisis, they too agree that patient was a candidate for psychiatric admission. Patient signed 12 for psychiatric admission. Suicidal ideations: acute illness or injury  Amount and/or Complexity of Data Reviewed  Labs: ordered. Risk  Prescription drug management.             Disposition  Final diagnoses:   Suicidal ideations     Time reflects when diagnosis was documented in both MDM as applicable and the Disposition within this note     Time User Action Codes Description Comment    8/1/2023 12:05 AM Micah Peterson Add [R45.851] Suicidal ideations       ED Disposition     None      MD Documentation    Flowsheet Row Most Recent Value   Sending MD Frank Cassidy      Follow-up Information    None         Patient's Medications   Discharge Prescriptions    No medications on file     No discharge procedures on file. PDMP Review       Value Time User    PDMP Reviewed  Yes 9/14/2022  3:35 PM Kailey White DO           ED Provider  Attending physically available and evaluated Nereida Hillman. I managed the patient along with the ED Attending.     Electronically Signed by         Ira Garcia DO  08/01/23 0006

## 2023-08-01 VITALS
BODY MASS INDEX: 48.65 KG/M2 | OXYGEN SATURATION: 100 % | HEART RATE: 86 BPM | DIASTOLIC BLOOD PRESSURE: 64 MMHG | SYSTOLIC BLOOD PRESSURE: 105 MMHG | TEMPERATURE: 98.6 F | RESPIRATION RATE: 18 BRPM | HEIGHT: 62 IN

## 2023-08-01 LAB
ALBUMIN SERPL BCP-MCNC: 3.7 G/DL (ref 3.5–5)
ALP SERPL-CCNC: 62 U/L (ref 34–104)
ALT SERPL W P-5'-P-CCNC: 16 U/L (ref 7–52)
ANION GAP SERPL CALCULATED.3IONS-SCNC: 8 MMOL/L
AST SERPL W P-5'-P-CCNC: 15 U/L (ref 13–39)
BASOPHILS # BLD AUTO: 0.05 THOUSANDS/ÂΜL (ref 0–0.1)
BASOPHILS NFR BLD AUTO: 1 % (ref 0–1)
BILIRUB SERPL-MCNC: 0.4 MG/DL (ref 0.2–1)
BUN SERPL-MCNC: 12 MG/DL (ref 5–25)
CALCIUM SERPL-MCNC: 9 MG/DL (ref 8.4–10.2)
CHLORIDE SERPL-SCNC: 108 MMOL/L (ref 96–108)
CO2 SERPL-SCNC: 22 MMOL/L (ref 21–32)
CREAT SERPL-MCNC: 0.8 MG/DL (ref 0.6–1.3)
EOSINOPHIL # BLD AUTO: 0.12 THOUSAND/ÂΜL (ref 0–0.61)
EOSINOPHIL NFR BLD AUTO: 2 % (ref 0–6)
ERYTHROCYTE [DISTWIDTH] IN BLOOD BY AUTOMATED COUNT: 13.2 % (ref 11.6–15.1)
GFR SERPL CREATININE-BSD FRML MDRD: 107 ML/MIN/1.73SQ M
GLUCOSE SERPL-MCNC: 98 MG/DL (ref 65–140)
HCT VFR BLD AUTO: 43.6 % (ref 34.8–46.1)
HGB BLD-MCNC: 14 G/DL (ref 11.5–15.4)
IMM GRANULOCYTES # BLD AUTO: 0.02 THOUSAND/UL (ref 0–0.2)
IMM GRANULOCYTES NFR BLD AUTO: 0 % (ref 0–2)
LYMPHOCYTES # BLD AUTO: 1.91 THOUSANDS/ÂΜL (ref 0.6–4.47)
LYMPHOCYTES NFR BLD AUTO: 26 % (ref 14–44)
MCH RBC QN AUTO: 28.4 PG (ref 26.8–34.3)
MCHC RBC AUTO-ENTMCNC: 32.1 G/DL (ref 31.4–37.4)
MCV RBC AUTO: 88 FL (ref 82–98)
MONOCYTES # BLD AUTO: 0.54 THOUSAND/ÂΜL (ref 0.17–1.22)
MONOCYTES NFR BLD AUTO: 7 % (ref 4–12)
NEUTROPHILS # BLD AUTO: 4.61 THOUSANDS/ÂΜL (ref 1.85–7.62)
NEUTS SEG NFR BLD AUTO: 64 % (ref 43–75)
NRBC BLD AUTO-RTO: 0 /100 WBCS
PLATELET # BLD AUTO: 346 THOUSANDS/UL (ref 149–390)
PMV BLD AUTO: 9.5 FL (ref 8.9–12.7)
POTASSIUM SERPL-SCNC: 4.1 MMOL/L (ref 3.5–5.3)
PROT SERPL-MCNC: 6.5 G/DL (ref 6.4–8.4)
RBC # BLD AUTO: 4.93 MILLION/UL (ref 3.81–5.12)
SODIUM SERPL-SCNC: 138 MMOL/L (ref 135–147)
WBC # BLD AUTO: 7.25 THOUSAND/UL (ref 4.31–10.16)

## 2023-08-01 PROCEDURE — 36415 COLL VENOUS BLD VENIPUNCTURE: CPT | Performed by: EMERGENCY MEDICINE

## 2023-08-01 PROCEDURE — 80053 COMPREHEN METABOLIC PANEL: CPT | Performed by: EMERGENCY MEDICINE

## 2023-08-01 PROCEDURE — 85025 COMPLETE CBC W/AUTO DIFF WBC: CPT | Performed by: EMERGENCY MEDICINE

## 2023-08-01 RX ORDER — NICOTINE 21 MG/24HR
21 PATCH, TRANSDERMAL 24 HOURS TRANSDERMAL ONCE
Status: DISCONTINUED | OUTPATIENT
Start: 2023-08-01 | End: 2023-08-01 | Stop reason: HOSPADM

## 2023-08-01 RX ADMIN — NICOTINE 21 MG: 21 PATCH, EXTENDED RELEASE TRANSDERMAL at 12:01

## 2023-08-01 NOTE — ED NOTES
201 signed and sent back to [facility] from crisis.       Carla Lockhart RN  07/31/23 5763

## 2023-08-01 NOTE — ED CARE HANDOFF
Emergency Department Sign Out Note        Sign out and transfer of care from Dr. Annette Padilla. See Separate Emergency Department note. The patient, Meg Fletcher, was evaluated by the previous provider for SI with plan. Workup Completed:  Medical clearance, 201 signed. ED Course / Workup Pending (followup):  Pending placement. ED Course as of 08/01/23 0536   Tue Aug 01, 2023   0057 SO - 20 yo F. Anxiety, depression, bipolar. SI with plan. 1:1. 201 signed. Pending placement. Procedures  Medical Decision Making    21 y.o. female presenting for SI.  1:1 in place. Patient is medically stable for inpatient psychiatric care. Patient agreeable to 201. 201 form signed and placed in patient chart. Patient continues to endorse SI. She denies any other complaints or needs. Patient care will be transferred pending inpatient mental health placement. Amount and/or Complexity of Data Reviewed  Labs: ordered. Risk  Prescription drug management. Disposition  Final diagnoses:   Suicidal ideations     Time reflects when diagnosis was documented in both MDM as applicable and the Disposition within this note     Time User Action Codes Description Comment    8/1/2023 12:05 AM Micah Peterson Add [R45.851] Suicidal ideations       ED Disposition     None      MD Documentation    [facility] Most Recent Value   Sending MD De La Cruz      Follow-up Information    None       Patient's Medications   Discharge Prescriptions    No medications on file     No discharge procedures on file.        ED Provider  Electronically Signed by     Daisy Aguiar DO  08/01/23 9005

## 2023-08-01 NOTE — ED NOTES
Follow call to [facility] regarding admission status. Spoke with Netta Crump who requested lab work and UDS. Documents faxed.

## 2023-08-01 NOTE — ED NOTES
Patient belongings secured and placed in med room at this time. Patient belongings sheets placed on patient chart with patient label.      Aleks Caro RN  07/31/23 2929
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
## 2023-08-01 NOTE — ED NOTES
Crisis contacted [facility], faxed referral for review.

## 2023-08-01 NOTE — EMTALA/ACUTE CARE TRANSFER
[address] EMERGENCY DEPARTMENT  [facility] [address]  Dept: [phone]      EMTALA TRANSFER CONSENT    NAME [address] [facility] 2005                              MRN [id number]    I have been informed of my rights regarding examination, treatment, and transfer   by Dr. Clair Barrera,*    Benefits:   Inpatient [facility] treatment    Risks:   Crash during transfer      Transfer Request   I acknowledge that my medical condition has been evaluated and explained to me by the emergency department physician or other qualified medical person and/or my attending physician who has recommended and offered to me further medical examination and treatment. I understand the Hospital's obligation with respect to the treatment and stabilization of my emergency medical condition. I nevertheless request to be transferred. I release the Hospital, the doctor, and any other persons caring for me from all responsibility or liability for any injury or ill effects that may result from my transfer and agree to accept all responsibility for the consequences of my choice to transfer, rather than receive stabilizing treatment at the Hospital. I understand that because the transfer is my request, my insurance may not provide reimbursement for the services. The Hospital will assist and direct me and my family in how to make arrangements for transfer, but the hospital is not liable for any fees charged by the transport service. In spite of this understanding, I refuse to consent to further medical examination and treatment which has been offered to me, and request transfer to  . I authorize the performance of emergency medical procedures and treatments upon me in both transit and upon arrival at the receiving facility.   Additionally, I authorize the release of any and all medical records to the receiving facility and request they be transported with me, if possible. I authorize the performance of emergency medical procedures and treatments upon me in both transit and upon arrival at the receiving facility. Additionally, I authorize the release of any and all medical records to the receiving facility and request they be transported with me, if possible. I understand that the safest mode of transportation during a medical emergency is an ambulance and that the Hospital advocates the use of this mode of transport. Risks of traveling to the receiving facility by car, including absence of medical control, life sustaining equipment, such as oxygen, and medical personnel has been explained to me and I fully understand them. (RORY CORRECT BOX BELOW)  [ X ]  I consent to the stated transfer and to be transported by ambulance/helicopter. [  ]  I consent to the stated transfer, but refuse transportation by ambulance and accept full responsibility for my transportation by car. I understand the risks of non-ambulance transfers and I exonerate the Hospital and its staff from any deterioration in my condition that results from this refusal.    X___________________________________________    DATE  23  TIME________  Signature of patient or legally responsible individual signing on patient behalf           RELATIONSHIP TO PATIENT_________________________          Provider Certification    NAME [address] [facility] 2005                              MRN [id number]    A medical screening exam was performed on the above named patient. Based on the examination:    Condition Necessitating Transfer The primary encounter diagnosis was Suicidal ideations. A diagnosis of Depression with suicidal ideation was also pertinent to this visit.     Patient Condition:   Stable     Reason for Transfer:   Need for inpatient behavioral health treatment    Transfer Requirements: [facility]  · Space available and qualified personnel available for treatment as acknowledged by   PACS  · Agreed to accept transfer and to provide appropriate medical treatment as acknowledged by: Dr. Danny Yang          · Appropriate medical records of the examination and treatment of the patient are provided at the time of transfer   [address] _______  · Transfer will be performed by qualified personnel from    and appropriate transfer equipment as required, including the use of necessary and appropriate life support measures. Provider Certification: I have examined the patient and explained the following risks and benefits of being transferred/refusing transfer to the patient/family:         Based on these reasonable risks and benefits to the patient and/or the unborn child(eyal), and based upon the information available at the time of the patient’s examination, I certify that the medical benefits reasonably to be expected from the provision of appropriate medical treatments at another medical facility outweigh the increasing risks, if any, to the individual’s medical condition, and in the case of labor to the unborn child, from effecting the transfer.     X____________________________________________ DATE 08/01/23        TIME_______      ORIGINAL - SEND TO MEDICAL RECORDS   COPY - SEND WITH PATIENT DURING TRANSFER

## 2023-08-01 NOTE — ED NOTES
Crisis prepared 201 and faxed for signatures. Patient was informed about inpatient hospitalization proces, 201 rights and 72 hours notice. Patient would like [facility] but open for other placement.

## 2023-08-01 NOTE — ED NOTES
Patient is accepted at [facility]. Patient is accepted by Dr. Reed Em. Transportation is arranged with [facility]  Transportation is scheduled for 8/1/23. Patient may go to the floor at as soon as possible. Nurse report is to be called to [phone] prior to patient transfer.

## 2023-08-01 NOTE — ED ATTENDING ATTESTATION
7/31/2023  IHernesto DO, saw and evaluated the patient. I have discussed the patient with the resident/non-physician practitioner and agree with the resident's/non-physician practitioner's findings, Plan of Care, and MDM as documented in the resident's/non-physician practitioner's note, except where noted. All available labs and Radiology studies were reviewed. I was present for key portions of any procedure(s) performed by the resident/non-physician practitioner and I was immediately available to provide assistance. At this point I agree with the current assessment done in the Emergency Department. I have conducted an independent evaluation of this patient a history and physical is as follows:    ED Course  ED Course as of 07/31/23 2009 Mon Jul 31, 2023   4092 Blood Pressure: 125/79   1942 Temperature: 98 °F (36.7 °C)   1942 Temp Source: Temporal   1942 Pulse: 98   1942 Respirations: 18   1942 SpO2: 98 %   1942 EXTBreath Alcohol: 0.00   1942 Pt medically cleared for crisis eval.    2008 PREGNANCY TEST URINE: Negative     Briefly, 75-year-old female the past medical history significant for ADHD, PTSD, anxiety, depression, bipolar disorder, presenting to the emergency room for evaluation of suicidal thoughts. Patient reports increasing thoughts of suicide/self-harm. She reports gradual onset of symptoms over days. She is on sure what is causing her symptoms to worsen. She describes episodes where she "blacks out" and then wakes up with cuts on her forearms and legs and she believes she is harming herself given her prior history. She feels like she would intentionally overdose on her psychiatric medications. She denies homicidal thoughts. She denies hallucinations. She endorses occasional cannabis use. She is requesting to sign a 201 for voluntary psychiatric admission. Review of Systems   Constitutional: Negative for activity change and appetite change.    Respiratory: Negative for shortness of breath. Cardiovascular: Negative for chest pain. Gastrointestinal: Negative for abdominal pain, nausea, rectal pain and vomiting. Musculoskeletal: Negative for back pain and neck pain. Neurological: Negative for weakness and headaches. Psychiatric/Behavioral: Positive for dysphoric mood, self-injury and suicidal ideas. Negative for hallucinations. The patient is not nervous/anxious. Physical Exam  Vitals and nursing note reviewed. Constitutional:       General: She is not in acute distress. Appearance: She is well-developed. She is obese. HENT:      Head: Normocephalic and atraumatic. Eyes:      Conjunctiva/sclera: Conjunctivae normal.   Cardiovascular:      Rate and Rhythm: Normal rate and regular rhythm. Heart sounds: No murmur heard. Pulmonary:      Effort: Pulmonary effort is normal. No respiratory distress. Breath sounds: Normal breath sounds. Abdominal:      Palpations: Abdomen is soft. Tenderness: There is no abdominal tenderness. Musculoskeletal:         General: No swelling. Cervical back: Neck supple. Skin:     General: Skin is warm and dry. Capillary Refill: Capillary refill takes less than 2 seconds. Neurological:      General: No focal deficit present. Mental Status: She is alert. Mental status is at baseline. Psychiatric:         Mood and Affect: Mood normal.       Vitals:    07/31/23 1909   BP: 125/79   Pulse: 98   Resp: 18   Temp: 98 °F (36.7 °C)   SpO2: 98%    A/P:  49-year-old female presenting for suicidal ideation/thoughts. Has not acted on plan but does have a plan. She is calm and cooperative. She is willing/wishing to sign 201. She is medically cleared at this time. Vital signs reviewed. She is pending crisis evaluation with anticipated plan to sign 201 bed search process.       Critical Care Time  Procedures

## 2023-08-01 NOTE — ED NOTES
Labs and UDS results faxed to [facility]

## 2023-08-02 ENCOUNTER — TRANSITIONAL CARE MANAGEMENT (OUTPATIENT)
Dept: FAMILY MEDICINE CLINIC | Facility: CLINIC | Age: 18
End: 2023-08-02

## 2023-08-08 ENCOUNTER — CLINICAL SUPPORT (OUTPATIENT)
Dept: FAMILY MEDICINE CLINIC | Facility: CLINIC | Age: 18
End: 2023-08-08
Payer: COMMERCIAL

## 2023-08-08 DIAGNOSIS — Z11.1 ENCOUNTER FOR PPD TEST: Primary | ICD-10-CM

## 2023-08-08 PROCEDURE — 86580 TB INTRADERMAL TEST: CPT | Performed by: FAMILY MEDICINE

## 2023-08-11 ENCOUNTER — TELEPHONE (OUTPATIENT)
Dept: FAMILY MEDICINE CLINIC | Facility: CLINIC | Age: 18
End: 2023-08-11

## 2023-08-11 LAB
INDURATION: 0 MM
TB SKIN TEST: NEGATIVE

## 2023-08-11 NOTE — TELEPHONE ENCOUNTER
She needs a letter that states she has asthma and uses an inhaler. What she will be doing is a clinical trail for a new inhaler that she will be getting paid for.

## 2023-08-11 NOTE — TELEPHONE ENCOUNTER
Patient called stating that she applied for a job and they need documentation that she has asthma and is on albuterol. She is a patient of Dr Meri Mcneal but asked if you would write this for her because she need it ASAP.  Please advise

## 2023-09-18 ENCOUNTER — OFFICE VISIT (OUTPATIENT)
Dept: FAMILY MEDICINE CLINIC | Facility: CLINIC | Age: 18
End: 2023-09-18
Payer: COMMERCIAL

## 2023-09-18 VITALS
BODY MASS INDEX: 52.81 KG/M2 | WEIGHT: 287 LBS | HEIGHT: 62 IN | TEMPERATURE: 98 F | DIASTOLIC BLOOD PRESSURE: 74 MMHG | OXYGEN SATURATION: 98 % | HEART RATE: 111 BPM | SYSTOLIC BLOOD PRESSURE: 112 MMHG

## 2023-09-18 DIAGNOSIS — J45.40 MODERATE PERSISTENT ASTHMA, UNSPECIFIED WHETHER COMPLICATED: ICD-10-CM

## 2023-09-18 DIAGNOSIS — E66.01 MORBID OBESITY WITH BMI OF 50.0-59.9, ADULT (HCC): ICD-10-CM

## 2023-09-18 DIAGNOSIS — Z00.00 ANNUAL PHYSICAL EXAM: Primary | ICD-10-CM

## 2023-09-18 DIAGNOSIS — N92.1 MENORRHAGIA WITH IRREGULAR CYCLE: ICD-10-CM

## 2023-09-18 PROCEDURE — 99395 PREV VISIT EST AGE 18-39: CPT | Performed by: FAMILY MEDICINE

## 2023-09-18 RX ORDER — DESVENLAFAXINE 25 MG/1
TABLET, EXTENDED RELEASE ORAL
COMMUNITY

## 2023-09-18 RX ORDER — ALBUTEROL SULFATE 2.5 MG/3ML
2.5 SOLUTION RESPIRATORY (INHALATION) EVERY 6 HOURS PRN
Qty: 180 ML | Refills: 5 | Status: SHIPPED | OUTPATIENT
Start: 2023-09-18 | End: 2023-09-18

## 2023-09-18 RX ORDER — ALBUTEROL SULFATE 2.5 MG/3ML
2.5 SOLUTION RESPIRATORY (INHALATION) EVERY 6 HOURS PRN
Qty: 180 ML | Refills: 5 | Status: SHIPPED | OUTPATIENT
Start: 2023-09-18

## 2023-09-18 RX ORDER — MIRTAZAPINE 30 MG/1
30 TABLET, FILM COATED ORAL
COMMUNITY
Start: 2023-07-17

## 2023-09-18 RX ORDER — ARIPIPRAZOLE 5 MG/1
5 TABLET ORAL DAILY
COMMUNITY
Start: 2023-08-11

## 2023-09-18 NOTE — PROGRESS NOTES
ADULT ANNUAL [address] [place] [address] PRIMARY CARE    NAME: Josephine Mckay  AGE: 21 y.o. SEX: female  : 2005     DATE: 2023     Assessment and Plan:   Referral to gynecology. Patient will reschedule with podiatry. Lab work ordered. Rx put in for a nebulizer and albuterol. Follow-up in 3 months or as needed. I encouraged her to cut back and hopefully quit smoking. She should also continue to watch her diet and try to be more active. Problem List Items Addressed This Visit        Respiratory    Asthma    Relevant Medications    albuterol (2.5 mg/3 mL) 0.083 % nebulizer solution   Other Visit Diagnoses     Annual physical exam    -  Primary    Morbid obesity with BMI of 50.0-59.9, adult ([address])        Menorrhagia with irregular cycle        Relevant Orders    Ambulatory Referral to Obstetrics / Gynecology          Immunizations and preventive care screenings were discussed with patient today. Appropriate education was printed on patient's after visit summary. Counseling:  Dental Health: discussed importance of regular tooth brushing, flossing, and dental visits. Exercise: the importance of regular exercise/physical activity was discussed. Recommend exercise 3-5 times per week for at least 30 minutes. BMI Counseling: Body mass index is 52.49 kg/m². The BMI is above normal. Nutrition recommendations include decreasing portion sizes, encouraging healthy choices of fruits and vegetables, decreasing fast food intake, consuming healthier snacks, limiting drinks that contain sugar, moderation in carbohydrate intake, increasing intake of lean protein, reducing intake of saturated and trans fat and reducing intake of cholesterol. Exercise recommendations include exercising 3-5 times per week. No pharmacotherapy was ordered. Rationale for BMI follow-up plan is due to patient being overweight or obese.      Tobacco Cessation Counseling: Tobacco cessation counseling was provided. The patient is sincerely urged to quit consumption of tobacco. She is not ready to quit tobacco.         Return in about 4 months (around 2024) for Recheck. Chief Complaint:     Chief Complaint   Patient presents with   • Annual Exam     Doing good, would like a nebulizer      History of Present Illness:     Adult Annual Physical   Patient here for a comprehensive physical exam. The patient reports problems - asthma. Diet and Physical Activity  Diet/Nutrition: poor diet. Exercise: no formal exercise. Depression Screening  PHQ-2/9 Depression Screening    Little interest or pleasure in doing things: 3 - nearly every day  Feeling down, depressed, or hopeless: 3 - nearly every day  Trouble falling or staying asleep, or sleeping too much: 3 - nearly every day  Feeling tired or having little energy: 3 - nearly every day  Poor appetite or overeatin - more than half the days  Feeling bad about yourself - or that you are a failure or have let yourself or your family down: 3 - nearly every day  Trouble concentrating on things, such as reading the newspaper or watching television: 3 - nearly every day  Moving or speaking so slowly that other people could have noticed. Or the opposite - being so fidgety or restless that you have been moving around a lot more than usual: 1 - several days  Thoughts that you would be better off dead, or of hurting yourself in some way: 1 - several days  PHQ-9 Score: 22   PHQ-9 Interpretation: Severe depression        General Health  Sleep: sleeps well. Hearing: normal - bilateral.  Vision: wears glasses. Dental: no dental visits for >1 year. /GYN Health  Last menstrual period: irregular  Contraceptive method: none. History of STDs?: no.     Review of Systems:     Review of Systems   Constitutional: Negative. Respiratory: Negative. Cardiovascular: Negative. Gastrointestinal: Negative. Genitourinary: Negative.        Past Medical History: Past Medical History:   Diagnosis Date   • ADHD (attention deficit hyperactivity disorder)    • Anxiety    • Asthma    • Bipolar disorder ([address])    • Depression    • Environmental allergies    • PONV (postoperative nausea and vomiting)    • PTSD (post-traumatic stress disorder)       Past Surgical History:     Past Surgical History:   Procedure Laterality Date   • [place] ARTHRD MIDTARSL/TARSOMETATARSAL MULT/TRANSVRS Left 10/13/2022    Procedure: ARTHRODESIS / FUSION FOOT;  Surgeon: Yordy Obrien DPM;  Location: [facility];  Service: Podiatry   • [place] OPEN TREATMENT TARSOMETATARSAL JOINT DISLOCATION Left 5/6/2022    Procedure: ORIF OF LEFT FOOT UTILIZING PLATES AND SCREW AS NECESSARY;  Surgeon: Yordy Obrien DPM;  Location: [facility];  Service: Podiatry   • [place] REMOVAL IMPLANT DEEP Left 7/28/2022    Procedure: REMOVAL HARDWARE FOOT;  Surgeon: Yordy Obrien DPM;  Location: [facility];  Service: Podiatry      Social History:     Social History     Socioeconomic History   • Marital status: Single     Spouse name: None   • Number of children: None   • Years of education: None   • Highest education level: None   Occupational History   • Occupation: student   Tobacco Use   • Smoking status: Every Day     Packs/day: 0.50     Types: Cigarettes   • Smokeless tobacco: Never   • Tobacco comments:     cutting back   Vaping Use   • Vaping Use: Some days   • Substances: Nicotine, Flavoring   Substance and Sexual Activity   • Alcohol use: Not Currently   • Drug use: Yes     Types: Marijuana     Comment: patient states about 1-2 times a month   • Sexual activity: Not Currently     Partners: Male   Other Topics Concern   • None   Social History Narrative   • None     Social Determinants of Health     Financial Resource Strain: Not on file   Food Insecurity: Not on file   Transportation Needs: Not on file   Physical Activity: Not on file   Stress: Not on file   Social Connections: Not on file Intimate Partner Violence: Not on file   Housing Stability: Not on file      Family History:     Family History   Problem Relation Age of Onset   • Autism Brother    • Diabetes type II Maternal Grandmother       Current Medications:     Current Outpatient Medications   Medication Sig Dispense Refill   • albuterol (2.5 mg/3 mL) 0.083 % nebulizer solution Take 3 mL (2.5 mg total) by nebulization every 6 (six) hours as needed for wheezing or shortness of breath 180 mL 5   • ARIPiprazole (ABILIFY) 5 mg tablet Take 5 mg by mouth daily     • Desvenlafaxine Succinate ER 25 MG TB24 Take by mouth     • mirtazapine (REMERON) 30 mg tablet Take 30 mg by mouth daily at bedtime       No current facility-administered medications for this visit. Allergies: Allergies   Allergen Reactions   • Latex Swelling   • Shrimp (Diagnostic) - Food Allergy Swelling   • Adhesive [Medical Tape] Itching     Paper tape-itching   • Other Swelling and Rash     Laundry soap unknown name      Physical Exam:     /74   Pulse (!) 111   Temp 98 °F (36.7 °C)   Ht 5' 2" (1.575 m)   Wt 130 kg (287 lb)   SpO2 98%   BMI 52.49 kg/m²     Physical Exam  Vitals reviewed. Constitutional:       General: She is not in acute distress. Appearance: Normal appearance. She is well-developed. She is not diaphoretic. HENT:      Head: Normocephalic and atraumatic. Eyes:      Conjunctiva/sclera: Conjunctivae normal.   Cardiovascular:      Rate and Rhythm: Normal rate and regular rhythm. Heart sounds: Normal heart sounds. No murmur heard. No friction rub. No gallop. Pulmonary:      Effort: Pulmonary effort is normal. No respiratory distress. Breath sounds: Normal breath sounds. No wheezing, rhonchi or rales. Musculoskeletal:      Right lower leg: No edema. Left lower leg: No edema. Neurological:      General: No focal deficit present. Mental Status: She is alert and oriented to person, place, and time. Psychiatric:         Mood and Affect: Mood normal.         Behavior: Behavior normal.         Thought Content:  Thought content normal.         Judgment: Judgment normal.          DO Juan Miller

## 2023-09-20 LAB
DME PARACHUTE DELIVERY DATE ACTUAL: NORMAL
DME PARACHUTE DELIVERY DATE REQUESTED: NORMAL
DME PARACHUTE ITEM DESCRIPTION: NORMAL
DME PARACHUTE ORDER STATUS: NORMAL
DME PARACHUTE SUPPLIER NAME: NORMAL
DME PARACHUTE SUPPLIER PHONE: NORMAL

## 2023-10-09 ENCOUNTER — OFFICE VISIT (OUTPATIENT)
Dept: FAMILY MEDICINE CLINIC | Facility: CLINIC | Age: 18
End: 2023-10-09
Payer: COMMERCIAL

## 2023-10-09 VITALS
OXYGEN SATURATION: 97 % | HEIGHT: 62 IN | HEART RATE: 100 BPM | WEIGHT: 293 LBS | SYSTOLIC BLOOD PRESSURE: 110 MMHG | TEMPERATURE: 96.8 F | BODY MASS INDEX: 53.92 KG/M2 | DIASTOLIC BLOOD PRESSURE: 76 MMHG

## 2023-10-09 DIAGNOSIS — M54.50 CHRONIC BILATERAL LOW BACK PAIN WITHOUT SCIATICA: ICD-10-CM

## 2023-10-09 DIAGNOSIS — G89.29 CHRONIC BILATERAL LOW BACK PAIN WITHOUT SCIATICA: ICD-10-CM

## 2023-10-09 DIAGNOSIS — B35.4 TINEA CORPORIS: Primary | ICD-10-CM

## 2023-10-09 PROBLEM — J45.20 MILD INTERMITTENT ASTHMA WITHOUT COMPLICATION: Status: ACTIVE | Noted: 2018-01-04

## 2023-10-09 PROBLEM — J45.909 ASTHMA: Status: RESOLVED | Noted: 2018-01-04 | Resolved: 2023-10-09

## 2023-10-09 PROCEDURE — 99213 OFFICE O/P EST LOW 20 MIN: CPT | Performed by: FAMILY MEDICINE

## 2023-10-09 RX ORDER — NYSTATIN 100000 [USP'U]/G
POWDER TOPICAL 2 TIMES DAILY
Qty: 60 G | Refills: 3 | Status: SHIPPED | OUTPATIENT
Start: 2023-10-09

## 2023-10-09 RX ORDER — METHOCARBAMOL 500 MG/1
500 TABLET, FILM COATED ORAL 3 TIMES DAILY PRN
Qty: 30 TABLET | Refills: 0 | Status: SHIPPED | OUTPATIENT
Start: 2023-10-09

## 2023-10-09 NOTE — PROGRESS NOTES
Name: Leana Hdz      : 2005      MRN: [id number]  Encounter Provider: Jose Michaels DO  Encounter Date: 10/9/2023   Encounter department: [address]   Rx for nystatin powder for her tenia corporis. For her back pain, Rx for methocarbamol. Also printed back exercises for her to do. She is unable to afford physical therapy. She will call if symptoms continue or increase. 1. Tinea corporis  -     nystatin (MYCOSTATIN) powder; Apply topically 2 (two) times a day    2. Chronic bilateral low back pain without sciatica  -     methocarbamol (ROBAXIN) 500 mg tablet; Take 1 tablet (500 mg total) by mouth 3 (three) times a day as needed for muscle spasms           Subjective     Patient here today with a couple concerns. The first 1 she states she has a red irritated rash underneath both breasts and underneath her abdominal fold. She has tried using baby powder and a salve over-the-counter, with no relief. It stings at times. Patient also has been having sharp low back pain, radiating to both sides. Does not go down her legs at all. She has tried warmth on it with no relief. No bowel or bladder problems. Rash    Back Pain      Review of Systems   Constitutional: Negative. Respiratory: Negative. Cardiovascular: Negative. Gastrointestinal: Negative. Genitourinary: Negative. Musculoskeletal: Positive for back pain. Skin: Positive for rash.        Past Medical History:   Diagnosis Date   • ADHD (attention deficit hyperactivity disorder)    • Anxiety    • Asthma    • Bipolar disorder ([address])    • Depression    • Environmental allergies    • PONV (postoperative nausea and vomiting)    • PTSD (post-traumatic stress disorder)      Past Surgical History:   Procedure Laterality Date   • [place] ARTHRD MIDTARSL/TARSOMETATARSAL MULT/TRANSVRS Left 10/13/2022    Procedure: ARTHRODESIS / FUSION FOOT;  Surgeon: Margaret Florian DPM;  Location: [facility]; Service: Podiatry   • [place] OPEN TREATMENT TARSOMETATARSAL JOINT DISLOCATION Left 5/6/2022    Procedure: ORIF OF LEFT FOOT UTILIZING PLATES AND SCREW AS NECESSARY;  Surgeon: Diasy Lomeli DPM;  Location: [facility];  Service: Podiatry   • [place] REMOVAL IMPLANT DEEP Left 7/28/2022    Procedure: REMOVAL HARDWARE FOOT;  Surgeon: Daisy Lomeli DPM;  Location: [facility];  Service: Podiatry     Family History   Problem Relation Age of Onset   • Autism Brother    • Diabetes type II Maternal Grandmother      Social History     Socioeconomic History   • Marital status: Single     Spouse name: None   • Number of children: None   • Years of education: None   • Highest education level: None   Occupational History   • Occupation: student   Tobacco Use   • Smoking status: Every Day     Packs/day: 0.50     Types: Cigarettes   • Smokeless tobacco: Never   • Tobacco comments:     cutting back   Vaping Use   • Vaping Use: Some days   • Substances: Nicotine, Flavoring   Substance and Sexual Activity   • Alcohol use: Yes     Comment: Occasional   • Drug use: Yes     Types: Marijuana     Comment: patient states about 1-2 times a month   • Sexual activity: Not Currently     Partners: Male   Other Topics Concern   • None   Social History Narrative   • None     Social Determinants of Health     Financial Resource Strain: Not on file   Food Insecurity: Not on file   Transportation Needs: Not on file   Physical Activity: Not on file   Stress: Not on file   Social Connections: Not on file   Intimate Partner Violence: Not on file   Housing Stability: Not on file     Current Outpatient Medications on File Prior to Visit   Medication Sig   • albuterol (2.5 mg/3 mL) 0.083 % nebulizer solution Take 3 mL (2.5 mg total) by nebulization every 6 (six) hours as needed for wheezing or shortness of breath   • ARIPiprazole (ABILIFY) 5 mg tablet Take 5 mg by mouth daily   • Desvenlafaxine Succinate ER 25 MG TB24 Take by mouth   • mirtazapine (REMERON) 30 mg tablet Take 30 mg by mouth daily at bedtime     Allergies   Allergen Reactions   • Latex Swelling   • Shrimp (Diagnostic) - Food Allergy Swelling   • Adhesive [Medical Tape] Itching     Paper tape-itching   • Other Swelling and Rash     Laundry soap unknown name     Immunization History   Administered Date(s) Administered   • DTaP 5 04/27/2004, 06/15/2004, 08/27/2008, 04/01/2009   • HPV9 06/11/2018, 08/28/2018   • Hep A, adult 01/12/2009, 09/08/2014   • Hep B, Adolescent or Pediatric 2003   • Hep B, adult 2005, 08/27/2008, 01/12/2009   • Hepatitis A 09/08/2014   • Hib (PRP-OMP) 04/27/2004, 06/15/2004   • INFLUENZA 10/16/2015, 10/24/2017   • IPV 04/27/2004, 06/15/2004, 08/27/2008   • Influenza, injectable, quadrivalent, preservative free 0.5 mL 11/06/2018   • Influenza, seasonal, injectable 10/16/2015, 10/24/2017   • MMR 08/27/2008, 01/12/2009   • Meningococcal MCV4P 09/29/2016, 08/05/2020   • Meningococcal, Unknown Serogroups 09/29/2016   • Pneumococcal Conjugate PCV 7 04/27/2004, 06/15/2004   • Rabies Immune Globulin 09/15/2018, 09/18/2018, 09/23/2018, 09/29/2018   • Rabies-IM Human Diploid Cell Culture 09/15/2018, 09/18/2018, 09/23/2018, 09/29/2018   • Tdap 09/29/2016   • Tuberculin Skin Test-PPD Intradermal 07/31/2023, 07/31/2023, 08/08/2023, 08/08/2023   • Varicella 08/27/2008, 01/12/2009       Objective     /76   Pulse 100   Temp (!) 96.8 °F (36 °C)   Ht 5' 2" (1.575 m)   Wt 134 kg (295 lb 6.4 oz)   SpO2 97%   BMI 54.03 kg/m²     Physical Exam  Vitals reviewed. Constitutional:       General: She is not in acute distress. Appearance: Normal appearance. She is well-developed. She is not ill-appearing or diaphoretic. HENT:      Head: Normocephalic and atraumatic. Eyes:      Conjunctiva/sclera: Conjunctivae normal.   Pulmonary:      Comments: Talking in full sentences in no distress  Musculoskeletal:         General: Tenderness (upper lumbar) present. Skin:     Findings: Rash (  Patient showed pictures of her lower abdominal rash, and underneath her breast rash. It appears like tenia) present. Neurological:      General: No focal deficit present. Mental Status: She is alert and oriented to person, place, and time. Psychiatric:         Mood and Affect: Mood normal.         Behavior: Behavior normal.         Thought Content:  Thought content normal.         Judgment: Judgment normal.       Lourdes Zelaya,

## 2023-11-14 NOTE — TELEPHONE ENCOUNTER
Called pt because I forgot to give her the soap for her sx on 7/28  A woman answered and said she wasn't there  I tried the other phone # on file, it didn't ring  Toxicology 66 years old female present to PST prior to repair of rectal prolapse, partial proctectomy, hemorrhoidectomy, proctoplasty with Dr. Gonzalez.   Plan   1. NPO after midnight  2.  Use E-Z sponge as directed  3. Drink a quart of extra  fluids the day before your surgery.  4. CBC, BMP, PT/PTT/INR, Urinalysis, Type and Screen sent to lab  5. EKG and CXR done

## 2023-11-30 ENCOUNTER — OFFICE VISIT (OUTPATIENT)
Dept: PODIATRY | Facility: CLINIC | Age: 18
End: 2023-11-30
Payer: COMMERCIAL

## 2023-11-30 ENCOUNTER — APPOINTMENT (OUTPATIENT)
Dept: RADIOLOGY | Facility: MEDICAL CENTER | Age: 18
End: 2023-11-30
Payer: COMMERCIAL

## 2023-11-30 VITALS — DIASTOLIC BLOOD PRESSURE: 80 MMHG | SYSTOLIC BLOOD PRESSURE: 105 MMHG | WEIGHT: 287 LBS | BODY MASS INDEX: 52.49 KG/M2

## 2023-11-30 DIAGNOSIS — S92.902K: ICD-10-CM

## 2023-11-30 DIAGNOSIS — M79.672 LEFT FOOT PAIN: ICD-10-CM

## 2023-11-30 DIAGNOSIS — M79.672 LEFT FOOT PAIN: Primary | ICD-10-CM

## 2023-11-30 PROCEDURE — 99213 OFFICE O/P EST LOW 20 MIN: CPT | Performed by: PODIATRIST

## 2023-11-30 PROCEDURE — 73630 X-RAY EXAM OF FOOT: CPT

## 2023-11-30 RX ORDER — MELOXICAM 15 MG/1
15 TABLET ORAL DAILY
Qty: 30 TABLET | Refills: 1 | Status: SHIPPED | OUTPATIENT
Start: 2023-11-30

## 2023-11-30 NOTE — PROGRESS NOTES
Assessment/Plan:    No problem-specific Assessment & Plan notes found for this encounter. Diagnoses and all orders for this visit:    Left foot pain  -     X-ray foot left 3+ views; Future  -     Osteogenesic Stimulator    Fracture, foot, left, with nonunion, subsequent encounter  -     Osteogenesic Stimulator      -CT and x-rays were reviewed, she is having more pain directly overlying the TMT J with some issues in the intercuneiform area  - She only use the bone stimulator for 2 weeks and then it broke, we have also follow-up a little bit due to transportation issues but she is still basically having nonunion pain  - She is still nicotine dependent  - X-rays were reviewed and do show nonunion clearly across the second TMT J with fracture gap of less than 1 mm. - We will attempt to use a bone stimulator for total of 3 months treatment to facilitate fusion of the intercuneiform area and the second TMT J  - If this is unsuccessful she will need bone grafting and repeat attempted fusion of surgery  -She is to return in 2 months, if there are any issues with the bone cement I told her to directly reach out to message me.  -It is also imperative that she does cease the use of nicotine    Subjective:      Patient ID: Richard Liz is a 19 y.o. female. Patient presents for evaluation and management of her left foot, she is complaining of having a screw shift and feeling pain discomfort swelling        The following portions of the patient's history were reviewed and updated as appropriate: allergies, current medications, past family history, past medical history, past social history, past surgical history, and problem list.    Review of Systems   Constitutional:  Negative for chills and fever. HENT:  Negative for ear pain and sore throat. Eyes:  Negative for pain and visual disturbance. Respiratory:  Negative for cough and shortness of breath. Cardiovascular:  Negative for chest pain and palpitations. Gastrointestinal:  Negative for abdominal pain and vomiting. Genitourinary:  Negative for dysuria and hematuria. Musculoskeletal:  Negative for arthralgias and back pain. Skin:  Negative for color change and rash. Neurological:  Negative for seizures and syncope. All other systems reviewed and are negative. Objective:      /80 (BP Location: Left arm, Patient Position: Sitting, Cuff Size: Large)   Wt 130 kg (287 lb)   BMI 52.49 kg/m²          Physical Exam  Musculoskeletal:      Comments:  There is point tenderness overlying the screw there is minimal swelling, there is pain with range of motion of the second TMT J, no pain with range of motion of the first TMT J.

## 2023-12-01 ENCOUNTER — DOCUMENTATION (OUTPATIENT)
Dept: OBGYN CLINIC | Facility: CLINIC | Age: 18
End: 2023-12-01

## 2023-12-01 NOTE — PROGRESS NOTES
Rec'd msg that pt's BS unit is not working. I called pt today  and lmom. I will need her to bring the unit in today to the [place] office. Also tried to call her mother; not able to leave any msg.  Noted in pt's account the followin Xiomy Lovell 2023 8:52:20 AM   Patient  Note   Closed      BONE STIM ISSUE- PATIENT CLAIMS THAT BS HAS STOPPED WORKING  I called patient and she will need to contact the toll free number on the manual. She states that no one called her back. I asked her about the bs to bring in. She said that It was at her moms.   called and lmom again for her to call me back. If she brings in the BS I will call for her and see about trouble shooting or if need get a new unit    8:50am.  The patient called.  I returned her call. She cannot make the trip to [place] as she is not home.  I asked if she would be able to have a family member bring the unit to me today. She states that her dad would not just drive to [place] for no reason. She said that she has no money to give to her dad to bring the BS in today. I expressed that the only way I can get a replacement unit would be if she brought the broken unit back.   3:00p Called pt. And left her a msg.  She did not show up today with the BS. She will need to return the unit to me so that I can call the company to trouble shoot or to replace.  Patient has not called back

## 2023-12-19 ENCOUNTER — OFFICE VISIT (OUTPATIENT)
Dept: GYNECOLOGY | Facility: CLINIC | Age: 18
End: 2023-12-19
Payer: COMMERCIAL

## 2023-12-19 VITALS
SYSTOLIC BLOOD PRESSURE: 114 MMHG | WEIGHT: 293 LBS | BODY MASS INDEX: 54.14 KG/M2 | DIASTOLIC BLOOD PRESSURE: 70 MMHG | HEART RATE: 82 BPM

## 2023-12-19 DIAGNOSIS — G43.901 MIGRAINE WITH STATUS MIGRAINOSUS, NOT INTRACTABLE, UNSPECIFIED MIGRAINE TYPE: ICD-10-CM

## 2023-12-19 DIAGNOSIS — N92.1 MENORRHAGIA WITH IRREGULAR CYCLE: Primary | ICD-10-CM

## 2023-12-19 PROCEDURE — 99213 OFFICE O/P EST LOW 20 MIN: CPT | Performed by: OBSTETRICS & GYNECOLOGY

## 2023-12-19 NOTE — PROGRESS NOTES
"Assessment/Plan:    Discussed options for heavy bleeding once again. Given mental health hx and probable migraine with aura, progestin only options reviewed and decision made to refrain from DMPA and try Nexplanon that can be removed if depression worsens.   Will place referral to neurology to evaluate headaches.  Pt advised to continue abstinence until Nexplanon ordered and placed. Irregular bleeding with nexplanon reviewed.     Diagnoses and all orders for this visit:    Menorrhagia with irregular cycle  -     Ambulatory Referral to Obstetrics / Gynecology    Migraine with status migrainosus, not intractable, unspecified migraine type  -     Ambulatory Referral to Neurology; Future        Subjective:      Patient ID: Josephine Mckay is a 18 y.o. female.    Pt presents with hx of menometrorrhagia although difficult to glean accurate history.   She reports heavy monthly menses with clots lasting 5-8 days, and occasionally longer. Last H/H 8/1/23, 14/43. TSH normal.  She was seen for same sx over a year ago, decided to start DMPA, but never started. Since that time, pt has been hospitalized with suicidal ideations. Today, she reports depression continues, but she is doing much better on medications.   When discussing contraindications to OCPs, pt reports she sees black spots in the middle of her eyes and knows that in about 20 mins, she is going to get \"a really bad headache.\" She has never been diagnosed with migraine with aura.   She is sexually active. Her partner lives about 2 hours away and they were last sexually active 2 months ago 10/2023.          The following portions of the patient's history were reviewed and updated as appropriate: allergies, current medications, past family history, past medical history, past social history, past surgical history and problem list.    Review of Systems   Constitutional: Negative.    Respiratory: Negative.     Cardiovascular: Negative.    Gastrointestinal: Negative.  "   Endocrine: Negative.    Genitourinary:  Positive for menstrual problem. Negative for dyspareunia, dysuria, frequency, pelvic pain, urgency, vaginal bleeding, vaginal discharge and vaginal pain.   Musculoskeletal: Negative.    Skin: Negative.    Neurological: Negative.    Psychiatric/Behavioral: Negative.           Objective:      /70   Pulse 82   Wt 134 kg (296 lb)   BMI 54.14 kg/m²          Physical Exam  Vitals and nursing note reviewed. Exam conducted with a chaperone present.   Constitutional:       Appearance: Normal appearance.   HENT:      Head: Normocephalic and atraumatic.   Pulmonary:      Effort: Pulmonary effort is normal.   Musculoskeletal:         General: Normal range of motion.      Cervical back: Normal range of motion.   Skin:     General: Skin is warm and dry.   Neurological:      Mental Status: She is alert and oriented to person, place, and time.   Psychiatric:         Mood and Affect: Mood normal.         Behavior: Behavior normal.         Thought Content: Thought content normal.         Judgment: Judgment normal.

## 2023-12-27 DIAGNOSIS — G89.29 CHRONIC BILATERAL LOW BACK PAIN WITHOUT SCIATICA: ICD-10-CM

## 2023-12-27 DIAGNOSIS — M54.50 CHRONIC BILATERAL LOW BACK PAIN WITHOUT SCIATICA: ICD-10-CM

## 2023-12-27 RX ORDER — METHOCARBAMOL 500 MG/1
500 TABLET, FILM COATED ORAL 3 TIMES DAILY PRN
Qty: 30 TABLET | Refills: 0 | Status: SHIPPED | OUTPATIENT
Start: 2023-12-27

## 2023-12-29 ENCOUNTER — TELEPHONE (OUTPATIENT)
Dept: GYNECOLOGY | Facility: CLINIC | Age: 18
End: 2023-12-29

## 2024-01-02 ENCOUNTER — TELEPHONE (OUTPATIENT)
Dept: GYNECOLOGY | Facility: CLINIC | Age: 19
End: 2024-01-02

## 2024-01-02 NOTE — TELEPHONE ENCOUNTER
Left message for patient informing her that we will need to reschedule today's appointment due to not having the nexplanon in office.

## 2024-01-17 ENCOUNTER — TELEPHONE (OUTPATIENT)
Dept: FAMILY MEDICINE CLINIC | Facility: CLINIC | Age: 19
End: 2024-01-17

## 2024-01-17 DIAGNOSIS — Z11.1 VISIT FOR TB SKIN TEST: Primary | ICD-10-CM

## 2024-01-29 DIAGNOSIS — J45.40 MODERATE PERSISTENT ASTHMA, UNSPECIFIED WHETHER COMPLICATED: ICD-10-CM

## 2024-01-29 RX ORDER — ALBUTEROL SULFATE 2.5 MG/3ML
2.5 SOLUTION RESPIRATORY (INHALATION) EVERY 6 HOURS PRN
Qty: 180 ML | Refills: 0 | Status: SHIPPED | OUTPATIENT
Start: 2024-01-29

## 2024-01-31 ENCOUNTER — OFFICE VISIT (OUTPATIENT)
Dept: FAMILY MEDICINE CLINIC | Facility: CLINIC | Age: 19
End: 2024-01-31
Payer: COMMERCIAL

## 2024-01-31 ENCOUNTER — APPOINTMENT (OUTPATIENT)
Dept: RADIOLOGY | Facility: MEDICAL CENTER | Age: 19
End: 2024-01-31
Payer: COMMERCIAL

## 2024-01-31 VITALS
SYSTOLIC BLOOD PRESSURE: 116 MMHG | HEIGHT: 62 IN | BODY MASS INDEX: 53.92 KG/M2 | HEART RATE: 109 BPM | DIASTOLIC BLOOD PRESSURE: 84 MMHG | WEIGHT: 293 LBS | OXYGEN SATURATION: 98 % | TEMPERATURE: 96.8 F

## 2024-01-31 DIAGNOSIS — G89.29 CHRONIC PAIN OF RIGHT HIP: ICD-10-CM

## 2024-01-31 DIAGNOSIS — H69.91 ACUTE DYSFUNCTION OF RIGHT EUSTACHIAN TUBE: ICD-10-CM

## 2024-01-31 DIAGNOSIS — M25.551 CHRONIC PAIN OF RIGHT HIP: ICD-10-CM

## 2024-01-31 DIAGNOSIS — G89.29 CHRONIC BILATERAL LOW BACK PAIN WITHOUT SCIATICA: ICD-10-CM

## 2024-01-31 DIAGNOSIS — R07.89 INTERMITTENT LEFT-SIDED CHEST PAIN: ICD-10-CM

## 2024-01-31 DIAGNOSIS — M54.50 CHRONIC BILATERAL LOW BACK PAIN WITHOUT SCIATICA: ICD-10-CM

## 2024-01-31 DIAGNOSIS — F33.2 SEVERE EPISODE OF RECURRENT MAJOR DEPRESSIVE DISORDER, WITHOUT PSYCHOTIC FEATURES (HCC): Primary | ICD-10-CM

## 2024-01-31 PROCEDURE — 73502 X-RAY EXAM HIP UNI 2-3 VIEWS: CPT

## 2024-01-31 PROCEDURE — 71046 X-RAY EXAM CHEST 2 VIEWS: CPT

## 2024-01-31 PROCEDURE — 99214 OFFICE O/P EST MOD 30 MIN: CPT | Performed by: FAMILY MEDICINE

## 2024-01-31 RX ORDER — METHOCARBAMOL 500 MG/1
500 TABLET, FILM COATED ORAL 3 TIMES DAILY PRN
Qty: 30 TABLET | Refills: 0 | Status: SHIPPED | OUTPATIENT
Start: 2024-01-31

## 2024-01-31 RX ORDER — OLANZAPINE 5 MG/1
5 TABLET ORAL
Qty: 30 TABLET | Refills: 0 | Status: SHIPPED | OUTPATIENT
Start: 2024-01-31

## 2024-01-31 RX ORDER — MIRTAZAPINE 30 MG/1
30 TABLET, FILM COATED ORAL
Qty: 30 TABLET | Refills: 0 | Status: SHIPPED | OUTPATIENT
Start: 2024-01-31

## 2024-01-31 RX ORDER — DESVENLAFAXINE 25 MG/1
25 TABLET, EXTENDED RELEASE ORAL DAILY
Qty: 30 TABLET | Refills: 0 | Status: SHIPPED | OUTPATIENT
Start: 2024-01-31

## 2024-01-31 RX ORDER — FLUTICASONE PROPIONATE 50 MCG
2 SPRAY, SUSPENSION (ML) NASAL DAILY
Qty: 16 G | Refills: 0 | Status: SHIPPED | OUTPATIENT
Start: 2024-01-31

## 2024-01-31 RX ORDER — OLANZAPINE 5 MG/1
5 TABLET ORAL
COMMUNITY
Start: 2023-12-31 | End: 2024-01-31 | Stop reason: SDUPTHER

## 2024-01-31 NOTE — PROGRESS NOTES
Name: Josephine Mckay      : 2005      MRN: [id number]  Encounter Provider: Shahzad Jade DO  Encounter Date: 2024   Encounter department: [facility]    Assessment & Plan   I will check a chest x-ray and a right hip x-ray.  Rx for Flonase to help with eustachian tube dysfunction.  Encouraged her to cut back and hopefully quit smoking.  I did refill her psychiatric meds, telling her that this is temporary, she needs to follow-up with her psychiatrist.  Follow-up here in 1 month or as needed.  1. Severe episode of recurrent major depressive disorder, without psychotic features (HCC)  -     OLANZapine (ZyPREXA) 5 mg tablet; Take 1 tablet (5 mg total) by mouth daily at bedtime  -     mirtazapine (REMERON) 30 mg tablet; Take 1 tablet (30 mg total) by mouth daily at bedtime  -     Desvenlafaxine Succinate ER 25 MG TB24; Take 1 tablet (25 mg total) by mouth in the morning    2. Intermittent left-sided chest pain  -     XR chest pa & lateral; Future; Expected date: 2024    3. Chronic pain of right hip  -     XR hip/pelv 2-3 vws right if performed; Future; Expected date: 2024    4. Acute dysfunction of right eustachian tube  -     fluticasone (FLONASE) 50 mcg/act nasal spray; 2 sprays into each nostril daily    5. BMI 50.0-59.9, adult (HCC)  -     Ambulatory referral to Weight Management; Future        Depression Screening and Follow-up Plan: Patient's depression screening was positive with a PHQ-9 score of 10. Patient declines further evaluation by mental health professional and/or medications. Brief counseling provided. Will re-evaluate at next office visit.     Tobacco Cessation Counseling: Tobacco cessation counseling was provided. The patient is sincerely urged to quit consumption of tobacco. She is not ready to quit tobacco.         Subjective     Patient here today with multiple concerns.  First 1 is she is not able to to get transportation to see her psychiatrist to fill her  psychiatry meds.  She would like me to refill them for now until she can get a ride to see them.,  She has been getting intermittent left-sided chest pain.  It is sharp in nature.  It is worse with activity.  She does not have any diaphoresis with it.  It hurts if she presses it.  She is also been getting intermittent right hip pain.  It feels like it locks on her.  Finally, she has been having right ear pressure.    Earache     Medication Refill  Associated symptoms include arthralgias (Right hip) and chest pain.     Review of Systems   Constitutional: Negative.    HENT:  Positive for ear pain.    Respiratory: Negative.     Cardiovascular:  Positive for chest pain.   Genitourinary: Negative.    Musculoskeletal:  Positive for arthralgias (Right hip).       Past Medical History:   Diagnosis Date   • ADHD (attention deficit hyperactivity disorder)    • Anxiety    • Asthma    • Bipolar disorder (HCC)    • Depression    • Environmental allergies    • PONV (postoperative nausea and vomiting)    • PTSD (post-traumatic stress disorder)      Past Surgical History:   Procedure Laterality Date   • MT ARTHRD MIDTARSL/TARSOMETATARSAL MULT/TRANSVRS Left 10/13/2022    Procedure: ARTHRODESIS / FUSION FOOT;  Surgeon: Houston Mercado DPM;  Location: [facility];  Service: Podiatry   • MT OPEN TREATMENT TARSOMETATARSAL JOINT DISLOCATION Left 5/6/2022    Procedure: ORIF OF LEFT FOOT UTILIZING PLATES AND SCREW AS NECESSARY;  Surgeon: Houston Mercado DPM;  Location: [facility];  Service: Podiatry   • MT REMOVAL IMPLANT DEEP Left 7/28/2022    Procedure: REMOVAL HARDWARE FOOT;  Surgeon: Houston Mercado DPM;  Location: [facility];  Service: Podiatry     Family History   Problem Relation Age of Onset   • Thyroid cancer Sister    • Autism Brother    • Diabetes type II Maternal Grandmother      Social History     Socioeconomic History   • Marital status: Single     Spouse name: None   • Number of children: None    • Years of education: None   • Highest education level: None   Occupational History   • Occupation: student   Tobacco Use   • Smoking status: Every Day     Current packs/day: 0.50     Types: Cigarettes   • Smokeless tobacco: Never   • Tobacco comments:     cutting back   Vaping Use   • Vaping status: Some Days   • Substances: Nicotine, Flavoring   Substance and Sexual Activity   • Alcohol use: Yes     Comment: Occasional   • Drug use: Yes     Types: Marijuana     Comment: patient states about 1-2 times a month   • Sexual activity: Not Currently     Partners: Male   Other Topics Concern   • None   Social History Narrative   • None     Social Determinants of Health     Financial Resource Strain: Not on file   Food Insecurity: No Food Insecurity (1/7/2022)    Received from [facility]    Hunger Vital Sign    • Worried About Running Out of Food in the Last Year: Never true    • Ran Out of Food in the Last Year: Never true   Transportation Needs: Not on file   Physical Activity: Not on file   Stress: Not on file   Social Connections: Unknown (8/2/2023)    Received from [facility]    Social Connection and Isolation Panel [NHANES]    • Frequency of Communication with Friends and Family: Not on file    • Frequency of Social Gatherings with Friends and Family: Not on file    • Attends Episcopal Services: Not on file    • Active Member of Clubs or Organizations: Not on file    • Attends Club or Organization Meetings: Not on file    • Marital Status: Never    Intimate Partner Violence: Unknown (1/6/2022)    Received from [facility]    Intimate Partner Violence    • Within the last year, have you been afraid of your partner, ex-partner or family member?: Not on file    • Within the last year, have you been humiliated or emotionally abused in other ways by your partner, ex-partner or family member?: Not on file    • Within the last year, have you been kicked, hit, slapped, or  otherwise physically hurt by your partner, ex-partner or family member?: Not on file    • Within the last year, have you been raped or forced to have any kind of sexual activity by your partner, ex-partner or family member?: Not on file   Housing Stability: Not on file     Current Outpatient Medications on File Prior to Visit   Medication Sig   • albuterol (2.5 mg/3 mL) 0.083 % nebulizer solution Take 3 mL (2.5 mg total) by nebulization every 6 (six) hours as needed for wheezing or shortness of breath   • meloxicam (Mobic) 15 mg tablet Take 1 tablet (15 mg total) by mouth daily   • methocarbamol (ROBAXIN) 500 mg tablet Take 1 tablet (500 mg total) by mouth 3 (three) times a day as needed for muscle spasms   • [DISCONTINUED] Desvenlafaxine Succinate ER 25 MG TB24 Take by mouth   • [DISCONTINUED] mirtazapine (REMERON) 30 mg tablet Take 30 mg by mouth daily at bedtime   • [DISCONTINUED] OLANZapine (ZyPREXA) 5 mg tablet Take 5 mg by mouth daily at bedtime   • nystatin (MYCOSTATIN) powder Apply topically 2 (two) times a day (Patient not taking: Reported on 11/30/2023)   • [DISCONTINUED] ARIPiprazole (ABILIFY) 5 mg tablet Take 5 mg by mouth daily (Patient not taking: Reported on 11/30/2023)     Allergies   Allergen Reactions   • Latex Swelling   • Shrimp (Diagnostic) - Food Allergy Swelling   • Adhesive [Medical Tape] Itching     Paper tape-itching   • Other Swelling and Rash     Laundry soap unknown name     Immunization History   Administered Date(s) Administered   • DTaP 5 04/27/2004, 06/15/2004, 08/27/2008, 04/01/2009   • HPV9 06/11/2018, 08/28/2018   • Hep A, adult 01/12/2009, 09/08/2014   • Hep B, Adolescent or Pediatric 2003   • Hep B, adult 2003, 08/27/2008, 01/12/2009   • Hepatitis A 09/08/2014   • Hib (PRP-OMP) 04/27/2004, 06/15/2004   • INFLUENZA 10/16/2015, 10/24/2017   • IPV 04/27/2004, 06/15/2004, 08/27/2008   • Influenza, injectable, quadrivalent, preservative free 0.5 mL 11/06/2018   •  "Influenza, seasonal, injectable 10/16/2015, 10/24/2017   • MMR 08/27/2008, 01/12/2009   • Meningococcal MCV4P 09/29/2016, 08/05/2020   • Meningococcal, Unknown Serogroups 09/29/2016   • Pneumococcal Conjugate PCV 7 04/27/2004, 06/15/2004   • Rabies Immune Globulin 09/15/2018, 09/18/2018, 09/23/2018, 09/29/2018   • Rabies-IM Human Diploid Cell Culture 09/15/2018, 09/18/2018, 09/23/2018, 09/29/2018   • Tdap 09/29/2016   • Tuberculin Skin Test-PPD Intradermal 07/31/2023, 07/31/2023, 08/08/2023, 08/08/2023   • Varicella 08/27/2008, 01/12/2009       Objective     /84   Pulse (!) 109   Temp (!) 96.8 °F (36 °C)   Ht 5' 2\" (1.575 m)   Wt (!) 138 kg (303 lb 9.6 oz)   SpO2 98%   BMI 55.53 kg/m²     Physical Exam  Vitals reviewed.   Constitutional:       General: She is not in acute distress.     Appearance: Normal appearance. She is well-developed. She is not ill-appearing, toxic-appearing or diaphoretic.   HENT:      Head: Normocephalic and atraumatic.      Right Ear: Tympanic membrane normal.   Eyes:      Conjunctiva/sclera: Conjunctivae normal.   Cardiovascular:      Rate and Rhythm: Normal rate and regular rhythm.      Heart sounds: Normal heart sounds. No murmur heard.     No friction rub. No gallop.   Pulmonary:      Effort: Pulmonary effort is normal. No respiratory distress.      Breath sounds: Normal breath sounds. No wheezing, rhonchi or rales.   Musculoskeletal:      Right lower leg: No edema.      Left lower leg: No edema.   Neurological:      General: No focal deficit present.      Mental Status: She is alert and oriented to person, place, and time.   Psychiatric:         Mood and Affect: Mood normal.         Behavior: Behavior normal.         Thought Content: Thought content normal.         Judgment: Judgment normal.       Shahzad Jade, DO    Depression Screening Follow-up Plan: Patient's depression screening was positive with a PHQ-2 score of . Their PHQ-9 score was 10. Patient assessed for " underlying major depression. They have no active suicidal ideations. Brief counseling provided and recommend additional follow-up/re-evaluation next office visit.

## 2024-01-31 NOTE — PATIENT INSTRUCTIONS
Depression   AMBULATORY CARE:   Depression  is a mood disorder that causes feelings of sadness or hopelessness that do not go away. Depression may cause you to lose interest in things you used to enjoy. These feelings may interfere with your daily life.  Common signs and symptoms:   Appetite changes, or weight gain or loss    Trouble falling or staying asleep, or sleeping too much    Fatigue (being mentally and physically tired) or lack of energy    Feeling restless, irritable, or withdrawn    Feeling worthless, hopeless, discouraged, or guilty    Trouble concentrating, remembering things, doing daily tasks, or making decisions    Thoughts about hurting or killing yourself    Call your local emergency number (911 in the [place]) if:   You think about hurting yourself or someone else.    You have done something on purpose to hurt yourself.    Call your therapist or doctor if:   Your symptoms get worse or do not get better with treatment.    Your depression keeps you from doing your regular daily activities.    You have new symptoms since your last visit.    You have questions or concerns about your condition or care.    The following resources are available at any time to help you, if needed:   Contact a suicide prevention organization:        For the [employer] Suicide and Crisis Lifeline:     Call or text [employer]     Send a chat on https://[employer].com.org/chat     Call 9-676-093-9817 (1-800-273-TALK)    For the Suicide Hotline, call 2-448-410-0055 (2-733-OFXBXDB)    For a list of international numbers: https://save.org/find-help/international-resources/  Treatment for depression  depends on how severe your symptoms are. You may need any of the following:  Cognitive behavioral therapy (CBT)  teaches you how to identify and change negative thought patterns.    Antidepressant medicine  may be given to decrease or manage symptoms. You may need to take this medicine for several weeks before they start working.    Self-care:   Talk to  someone about your depression.  Your healthcare provider may suggest counseling. You might feel more comfortable talking with a friend or family member about your depression. Choose someone you know will be supportive and encouraging.    Get regular physical activity.  Physical activity can lower your stress, improve your mood, and help you sleep better. Work with your healthcare provider to develop a plan that you enjoy.         Create a regular sleep schedule.  A routine can help you relax before bed. Listen to music, read, or do yoga. Try to go to bed and wake up at the same time every day. Sleep is important for emotional health.    Eat a variety of healthy foods.  Healthy foods include fruits, vegetables, whole-grain breads, low-fat dairy products, lean meats, fish, and cooked beans. A healthy meal plan is low in fat, salt, and added sugar.         Do not use alcohol, drugs, or nicotine products.  These can worsen depression or make it hard to manage. Talk to your therapist or healthcare provider if you use any of these products and need help to quit.    Follow up with your therapist or doctor as directed:  Your healthcare provider will monitor your progress at follow-up visits. Your provider will also monitor your medicine if you take antidepressants and ask if the medicine is helping. Tell your provider about any side effects or problems you have with your medicine. The type or amount of medicine may need to be changed. Write down your questions so you remember to ask them during your visits.  For more information or support:   [employer]  [address]  [place] [address]  Phone: 5- 607 - 380-9963  Phone: 3- 139 - 716-0868  Web Address: http://www.julio.org  [address] Suicide and Crisis Lifeline  [address]  [place] [address]  Phone: 3- 146 - 000  Web Address: http://www.suicidepreventionlifeline.org OR https://[employer].org/chat/    © Copyright [employer] 2023  Information is for End User's use only and may not be sold, redistributed or otherwise used for commercial purposes.  The above information is an  only. It is not intended as medical advice for individual conditions or treatments. Talk to your doctor, nurse or pharmacist before following any medical regimen to see if it is safe and effective for you.

## 2024-02-15 ENCOUNTER — TELEPHONE (OUTPATIENT)
Dept: PAIN MEDICINE | Facility: CLINIC | Age: 19
End: 2024-02-15

## 2024-02-15 ENCOUNTER — TELEPHONE (OUTPATIENT)
Dept: PODIATRY | Facility: CLINIC | Age: 19
End: 2024-02-15

## 2024-02-15 NOTE — TELEPHONE ENCOUNTER
2/15/2024 Called patient left a message that we need to reschedule her 2/19/24 appt with Dr Mercado. gave her phone number [phone] to return call. Also suggested if she can make appt at different location Dr Mercado in [place] is booked far out (May). -kk

## 2024-02-20 DIAGNOSIS — F33.2 SEVERE EPISODE OF RECURRENT MAJOR DEPRESSIVE DISORDER, WITHOUT PSYCHOTIC FEATURES (HCC): ICD-10-CM

## 2024-02-20 RX ORDER — MIRTAZAPINE 30 MG/1
30 TABLET, FILM COATED ORAL
Qty: 30 TABLET | Refills: 0 | Status: SHIPPED | OUTPATIENT
Start: 2024-02-20

## 2024-02-20 RX ORDER — OLANZAPINE 5 MG/1
5 TABLET ORAL
Qty: 30 TABLET | Refills: 0 | Status: SHIPPED | OUTPATIENT
Start: 2024-02-20

## 2024-02-23 DIAGNOSIS — F33.2 SEVERE EPISODE OF RECURRENT MAJOR DEPRESSIVE DISORDER, WITHOUT PSYCHOTIC FEATURES (HCC): ICD-10-CM

## 2024-02-23 RX ORDER — DESVENLAFAXINE 25 MG/1
25 TABLET, EXTENDED RELEASE ORAL DAILY
Qty: 30 TABLET | Refills: 0 | Status: SHIPPED | OUTPATIENT
Start: 2024-02-23

## 2024-02-27 ENCOUNTER — TELEPHONE (OUTPATIENT)
Dept: GYNECOLOGY | Facility: CLINIC | Age: 19
End: 2024-02-27

## 2024-02-27 NOTE — TELEPHONE ENCOUNTER
ANDRES for Pt. To the call the office to see if She wants to reschedule her missed appt.,  In [place] on 1/2/2024

## 2024-03-03 DIAGNOSIS — G89.29 CHRONIC BILATERAL LOW BACK PAIN WITHOUT SCIATICA: ICD-10-CM

## 2024-03-03 DIAGNOSIS — M54.50 CHRONIC BILATERAL LOW BACK PAIN WITHOUT SCIATICA: ICD-10-CM

## 2024-03-04 RX ORDER — METHOCARBAMOL 500 MG/1
500 TABLET, FILM COATED ORAL 3 TIMES DAILY PRN
Qty: 30 TABLET | Refills: 0 | Status: SHIPPED | OUTPATIENT
Start: 2024-03-04

## 2024-03-25 DIAGNOSIS — F33.2 SEVERE EPISODE OF RECURRENT MAJOR DEPRESSIVE DISORDER, WITHOUT PSYCHOTIC FEATURES (HCC): ICD-10-CM

## 2024-03-25 RX ORDER — DESVENLAFAXINE 25 MG/1
25 TABLET, EXTENDED RELEASE ORAL DAILY
Qty: 30 TABLET | Refills: 0 | Status: SHIPPED | OUTPATIENT
Start: 2024-03-25

## 2024-03-25 RX ORDER — OLANZAPINE 5 MG/1
5 TABLET ORAL
Qty: 30 TABLET | Refills: 0 | Status: SHIPPED | OUTPATIENT
Start: 2024-03-25

## 2024-03-26 ENCOUNTER — CLINICAL SUPPORT (OUTPATIENT)
Dept: BARIATRICS | Facility: CLINIC | Age: 19
End: 2024-03-26

## 2024-03-26 VITALS — BODY MASS INDEX: 53.92 KG/M2 | HEIGHT: 62 IN | WEIGHT: 293 LBS

## 2024-03-26 DIAGNOSIS — Z98.84 BARIATRIC SURGERY STATUS: Primary | ICD-10-CM

## 2024-03-26 PROCEDURE — RECHECK

## 2024-06-06 ENCOUNTER — OFFICE VISIT (OUTPATIENT)
Dept: FAMILY MEDICINE CLINIC | Facility: CLINIC | Age: 19
End: 2024-06-06
Payer: COMMERCIAL

## 2024-06-06 ENCOUNTER — APPOINTMENT (OUTPATIENT)
Dept: RADIOLOGY | Facility: MEDICAL CENTER | Age: 19
End: 2024-06-06
Payer: COMMERCIAL

## 2024-06-06 ENCOUNTER — TELEPHONE (OUTPATIENT)
Age: 19
End: 2024-06-06

## 2024-06-06 VITALS
OXYGEN SATURATION: 98 % | SYSTOLIC BLOOD PRESSURE: 118 MMHG | HEIGHT: 62 IN | HEART RATE: 82 BPM | WEIGHT: 293 LBS | BODY MASS INDEX: 53.92 KG/M2 | DIASTOLIC BLOOD PRESSURE: 66 MMHG

## 2024-06-06 DIAGNOSIS — M54.50 CHRONIC BILATERAL LOW BACK PAIN WITHOUT SCIATICA: ICD-10-CM

## 2024-06-06 DIAGNOSIS — G89.29 CHRONIC BILATERAL LOW BACK PAIN WITHOUT SCIATICA: ICD-10-CM

## 2024-06-06 DIAGNOSIS — J45.20 MILD INTERMITTENT ASTHMA WITHOUT COMPLICATION: ICD-10-CM

## 2024-06-06 DIAGNOSIS — G89.29 CHRONIC BILATERAL LOW BACK PAIN WITHOUT SCIATICA: Primary | ICD-10-CM

## 2024-06-06 DIAGNOSIS — M54.50 CHRONIC BILATERAL LOW BACK PAIN WITHOUT SCIATICA: Primary | ICD-10-CM

## 2024-06-06 PROCEDURE — 99214 OFFICE O/P EST MOD 30 MIN: CPT | Performed by: FAMILY MEDICINE

## 2024-06-06 PROCEDURE — 72110 X-RAY EXAM L-2 SPINE 4/>VWS: CPT

## 2024-06-06 RX ORDER — PRAZOSIN HYDROCHLORIDE 5 MG/1
5 CAPSULE ORAL
COMMUNITY

## 2024-06-06 RX ORDER — METHOCARBAMOL 500 MG/1
500 TABLET, FILM COATED ORAL 3 TIMES DAILY PRN
Qty: 30 TABLET | Refills: 0 | Status: SHIPPED | OUTPATIENT
Start: 2024-06-06

## 2024-06-06 RX ORDER — ALPRAZOLAM 0.25 MG/1
0.25 TABLET ORAL 3 TIMES DAILY PRN
COMMUNITY

## 2024-06-06 RX ORDER — DESVENLAFAXINE 100 MG/1
100 TABLET, EXTENDED RELEASE ORAL EVERY MORNING
COMMUNITY
Start: 2024-04-02

## 2024-06-06 NOTE — PROGRESS NOTES
Ambulatory Visit  Name: Josephine Mckay      : 2005      MRN: [id number]  Encounter Provider: Shahzad Jade DO  Encounter Date: 2024   Encounter department: [facility]    Assessment & Plan   Patient does not have transportation and cannot get to physical therapy.  I will get x-rays of her lumbar spine.  I will refer her to pain management.  I also will refer her to weight management here in town.  As always, I encouraged her to cut back and quit smoking.  Follow-up in 2 months or as needed  1. Chronic bilateral low back pain without sciatica  -     XR spine lumbar minimum 4 views non injury; Future; Expected date: 2024  -     Ambulatory referral to Spine & Pain Management; Future  -     methocarbamol (ROBAXIN) 500 mg tablet; Take 1 tablet (500 mg total) by mouth 3 (three) times a day as needed for muscle spasms  2. BMI 50.0-59.9, adult ([facility])  -     Ambulatory Referral to Weight Management; Future  3. Mild intermittent asthma without complication         History of Present Illness     Patient states her back pain is worse.  Now throughout the whole back.  She cannot stand for more than about 10 minutes without being in pain.  She denies any radiation down her legs.  She also was not able to see weight management, because they referred her to [place].  She is still smoking.  Her breathing has been stable.    Back Pain      Review of Systems   Constitutional: Negative.    Respiratory: Negative.     Cardiovascular: Negative.    Gastrointestinal: Negative.    Genitourinary: Negative.    Musculoskeletal:  Positive for back pain.     Past Medical History:   Diagnosis Date   • ADHD (attention deficit hyperactivity disorder)    • Anxiety    • Asthma    • Bipolar disorder ([facility])    • Depression    • Environmental allergies    • PONV (postoperative nausea and vomiting)    • PTSD (post-traumatic stress disorder)      Past Surgical History:   Procedure Laterality Date   • BELLA MORALES  MIDTARSL/TARSOMETATARSAL MULT/TRANSVRS Left 10/13/2022    Procedure: ARTHRODESIS / FUSION FOOT;  Surgeon: Houston Mercado DPM;  [facility];  Service: Podiatry   • VT OPEN TREATMENT TARSOMETATARSAL JOINT DISLOCATION Left 5/6/2022    Procedure: ORIF OF LEFT FOOT UTILIZING PLATES AND SCREW AS NECESSARY;  Surgeon: Houston Mercado DPM;  [facility];  Service: Podiatry   • VT REMOVAL IMPLANT DEEP Left 7/28/2022    Procedure: REMOVAL HARDWARE FOOT;  Surgeon: Houston Mercado DPM;  [facility]: [facility];  Service: Podiatry     Family History   Problem Relation Age of Onset   • Thyroid cancer Sister    • Autism Brother    • Diabetes type II Maternal Grandmother      Social History     Tobacco Use   • Smoking status: Every Day     Current packs/day: 0.50     Types: Cigarettes   • Smokeless tobacco: Never   • Tobacco comments:     cutting back   Vaping Use   • Vaping status: Some Days   • Substances: Nicotine, Flavoring   Substance and Sexual Activity   • Alcohol use: Yes     Comment: Occasional   • Drug use: Yes     Types: Marijuana     Comment: patient states about 1-2 times a month   • Sexual activity: Not Currently     Partners: Male     Current Outpatient Medications on File Prior to Visit   Medication Sig   • albuterol (2.5 mg/3 mL) 0.083 % nebulizer solution Take 3 mL (2.5 mg total) by nebulization every 6 (six) hours as needed for wheezing or shortness of breath   • ALPRAZolam (XANAX) 0.25 mg tablet Take 0.25 mg by mouth Three times daily as needed   • desvenlafaxine (PRISTIQ) 100 mg 24 hr tablet Take 100 mg by mouth every morning   • mirtazapine (REMERON) 30 mg tablet Take 1 tablet (30 mg total) by mouth daily at bedtime   • OLANZapine (ZyPREXA) 5 mg tablet Take 1 tablet (5 mg total) by mouth daily at bedtime (Patient taking differently: Take 10 mg by mouth daily at bedtime)   • prazosin (MINIPRESS) 5 mg capsule Take 5 mg by mouth daily at bedtime   • [DISCONTINUED]  Desvenlafaxine Succinate ER 25 MG TB24 Take 1 tablet (25 mg total) by mouth in the morning (Patient not taking: Reported on 6/6/2024)   • [DISCONTINUED] fluticasone (FLONASE) 50 mcg/act nasal spray 2 sprays into each nostril daily (Patient not taking: Reported on 3/26/2024)   • [DISCONTINUED] meloxicam (Mobic) 15 mg tablet Take 1 tablet (15 mg total) by mouth daily (Patient not taking: Reported on 6/6/2024)   • [DISCONTINUED] methocarbamol (ROBAXIN) 500 mg tablet Take 1 tablet (500 mg total) by mouth 3 (three) times a day as needed for muscle spasms (Patient not taking: Reported on 6/6/2024)   • [DISCONTINUED] nystatin (MYCOSTATIN) powder Apply topically 2 (two) times a day (Patient not taking: Reported on 11/30/2023)     Allergies   Allergen Reactions   • Latex Swelling   • Shrimp (Diagnostic) - Food Allergy Swelling   • Adhesive [Medical Tape] Itching     Paper tape-itching   • Other Swelling and Rash     Laundry soap unknown name     Immunization History   Administered Date(s) Administered   • DTaP 5 04/27/2004, 06/15/2004, 08/27/2008, 04/01/2009   • HPV9 06/11/2018, 08/28/2018   • Hep A, adult 01/12/2009, 09/08/2014   • Hep B, Adolescent or Pediatric 2003   • Hep B, adult 2003, 08/27/2008, 01/12/2009   • Hepatitis A 09/08/2014   • Hib (PRP-OMP) 04/27/2004, 06/15/2004   • INFLUENZA 10/16/2015, 10/24/2017   • IPV 04/27/2004, 06/15/2004, 08/27/2008   • Influenza, injectable, quadrivalent, preservative free 0.5 mL 11/06/2018   • Influenza, seasonal, injectable 10/16/2015, 10/24/2017   • MMR 08/27/2008, 01/12/2009   • Meningococcal MCV4P 09/29/2016, 08/05/2020   • Meningococcal, Unknown Serogroups 09/29/2016   • Pneumococcal Conjugate PCV 7 04/27/2004, 06/15/2004   • Rabies Immune Globulin 09/15/2018, 09/18/2018, 09/23/2018, 09/29/2018   • Rabies-IM Human Diploid Cell Culture 09/15/2018, 09/18/2018, 09/23/2018, 09/29/2018   • Tdap 09/29/2016   • Tuberculin Skin Test-PPD Intradermal 07/31/2023, 07/31/2023,  "08/08/2023, 08/08/2023   • Varicella 08/27/2008, 01/12/2009     Objective     /66   Pulse 82   Ht 5' 2\" (1.575 m)   Wt (!) 147 kg (324 lb 6.4 oz)   SpO2 98%   BMI 59.33 kg/m²     Physical Exam  Vitals reviewed.   Constitutional:       General: She is not in acute distress.     Appearance: Normal appearance. She is well-developed. She is not diaphoretic.   HENT:      Head: Normocephalic and atraumatic.   Eyes:      Conjunctiva/sclera: Conjunctivae normal.   Cardiovascular:      Rate and Rhythm: Normal rate and regular rhythm.      Heart sounds: Normal heart sounds. No murmur heard.     No friction rub. No gallop.   Pulmonary:      Effort: Pulmonary effort is normal. No respiratory distress.      Breath sounds: Normal breath sounds. No wheezing, rhonchi or rales.   Musculoskeletal:      Right lower leg: No edema.      Left lower leg: No edema.   Neurological:      General: No focal deficit present.      Mental Status: She is alert and oriented to person, place, and time.   Psychiatric:         Mood and Affect: Mood normal.         Behavior: Behavior normal.         Thought Content: Thought content normal.         Judgment: Judgment normal.       Administrative Statements         "

## 2024-06-12 ENCOUNTER — OFFICE VISIT (OUTPATIENT)
Dept: URGENT CARE | Facility: MEDICAL CENTER | Age: 19
End: 2024-06-12
Payer: COMMERCIAL

## 2024-06-12 ENCOUNTER — APPOINTMENT (OUTPATIENT)
Dept: RADIOLOGY | Facility: MEDICAL CENTER | Age: 19
End: 2024-06-12
Payer: COMMERCIAL

## 2024-06-12 VITALS
TEMPERATURE: 97.6 F | RESPIRATION RATE: 18 BRPM | HEART RATE: 96 BPM | DIASTOLIC BLOOD PRESSURE: 80 MMHG | OXYGEN SATURATION: 97 % | SYSTOLIC BLOOD PRESSURE: 120 MMHG

## 2024-06-12 DIAGNOSIS — S01.81XA LACERATION OF PERIORBITAL AREA, INITIAL ENCOUNTER: ICD-10-CM

## 2024-06-12 DIAGNOSIS — S06.0X0A CONCUSSION WITHOUT LOSS OF CONSCIOUSNESS, INITIAL ENCOUNTER: Primary | ICD-10-CM

## 2024-06-12 DIAGNOSIS — S09.93XA FACIAL TRAUMA, INITIAL ENCOUNTER: ICD-10-CM

## 2024-06-12 PROCEDURE — 70150 X-RAY EXAM OF FACIAL BONES: CPT

## 2024-06-12 PROCEDURE — 99214 OFFICE O/P EST MOD 30 MIN: CPT

## 2024-06-12 RX ORDER — CEPHALEXIN 500 MG/1
500 CAPSULE ORAL EVERY 6 HOURS SCHEDULED
Qty: 28 CAPSULE | Refills: 0 | Status: SHIPPED | OUTPATIENT
Start: 2024-06-12 | End: 2024-06-19

## 2024-06-12 NOTE — PATIENT INSTRUCTIONS
Concussion Recovery Guide:    Screen Time, Lights and Sounds:   Avoid excessive auditory and visual input.  This includes TVs, laptops, cough Alfonse, tablets and music.  If you must listen to music, it should be soft classical music with no lyrics.  Sunglasses and earplugs should be worn and bright in loud environments recent actively.  Bluelight glasses may be beneficial from residual sensitivity to computer and projector screens.  Avoid wearing sunglasses indoors when at all possible.    Sleep hygiene:  Get 8 to 10 hours of quality codefendant waking up same time each evening morning.  Naps may be taken during the day as long as they do not interfere with nighttime sleep.  Do not use any devices with the screen within 60 minutes of going to bed.  Do not take showers within 30 minutes of going to bed.  Avoid caffeine after 12 PM.  This includes chocolate and soda.    If you continue to have difficulty falling asleep or staying asleep, melatonin may be used.  Start at 1 mg dose and increase the dose every 2 to 3 days as needed.  Do not exceed a 5 mg dose without consulting your physician.    Diet and exercise:  Hydrate well and eat well-balanced meals.  Avoid NSAIDs, such as ibuprofen, Motrin, naproxen etc.  Tylenol is acceptable to take as needed.  Exercise is helpful to recovery.  Taking 5 to 10-minute walks (if symptoms do not worsen) has been found to be beneficial.  Continue to avoid sports specific activities and strenuous activities until directed by her physician    Student athlete must be cleared of all academic modifications prior to returning to extracurricular activities/sports.  At all times activities which put you at an increased likelihood of having a repeat head impact should be avoided.    Mood/Affect:   It is not unlikely for you to have varying moods or irritability.  Family member should be made aware of this and the patient with your recovery.

## 2024-06-12 NOTE — PROGRESS NOTES
[facility] Now        NAME: Josephine Mckay is a 18 y.o. female  : 2005    MRN: [id number]  DATE: 2024  TIME: 11:00 AM    Assessment and Plan   Concussion without loss of consciousness, initial encounter [S06.0X0A]  1. Concussion without loss of consciousness, initial encounter  Laceration repair    cephalexin (KEFLEX) 500 mg capsule      2. Facial trauma, initial encounter  XR facial bones 3+ vw    Laceration repair    cephalexin (KEFLEX) 500 mg capsule      3. Laceration of periorbital area, initial encounter  XR facial bones 3+ vw    Laceration repair    cephalexin (KEFLEX) 500 mg capsule            Patient Instructions       Follow up with PCP in 3-5 days.  Proceed to  ER if symptoms worsen.    If tests are performed, our office will contact you with results only if changes need to made to the care plan discussed with you at the visit. You can review your full results on [facility].    Chief Complaint     Chief Complaint   Patient presents with   • Head Injury     Hit head yesterday off of the end of a table after tripping over a squeaky toy. Her vision was going in and out afterwards. Vision is still a little spotty today but not going in and out like it was yesterday. She was dizzy, nausea with no vomiting after she hit her head. She does have a cut on her L side of her forehead. The cut was cleaned with iodine and a bandage was applied. She changed the bandage today and noticed some white pus.          History of Present Illness       Yesterday around 1/130pm she was in her home and tripped on a dog squeeky toy falling forward and struck the left eyebrow area on the wooden end table. She did not have LOC. She did have dizziness. She has not had any neck/back pain aside from her chronic pain. She cleansed the area of injury and then applied a bandage. This AM she went to change the bandage and noted drainage on the bandage. Had some change in vision yesterday- reports it has  improved today but still a little 'spotty' at times.       Tdap 9/29/16        Review of Systems   Review of Systems   Constitutional:  Negative for chills, fatigue and fever.   Eyes:  Positive for pain and visual disturbance.   Gastrointestinal:  Positive for nausea. Negative for vomiting.   Musculoskeletal:  Positive for back pain. Negative for neck pain and neck stiffness.        Chronic back pain, nothing outside her normal.    Skin:  Positive for wound.   Neurological:  Positive for dizziness and headaches. Negative for light-headedness.         Current Medications       Current Outpatient Medications:   •  albuterol (2.5 mg/3 mL) 0.083 % nebulizer solution, Take 3 mL (2.5 mg total) by nebulization every 6 (six) hours as needed for wheezing or shortness of breath, Disp: 180 mL, Rfl: 0  •  ALPRAZolam (XANAX) 0.25 mg tablet, Take 0.25 mg by mouth Three times daily as needed, Disp: , Rfl:   •  cephalexin (KEFLEX) 500 mg capsule, Take 1 capsule (500 mg total) by mouth every 6 (six) hours for 7 days, Disp: 28 capsule, Rfl: 0  •  desvenlafaxine (PRISTIQ) 100 mg 24 hr tablet, Take 100 mg by mouth every morning, Disp: , Rfl:   •  methocarbamol (ROBAXIN) 500 mg tablet, Take 1 tablet (500 mg total) by mouth 3 (three) times a day as needed for muscle spasms, Disp: 30 tablet, Rfl: 0  •  mirtazapine (REMERON) 30 mg tablet, Take 1 tablet (30 mg total) by mouth daily at bedtime, Disp: 30 tablet, Rfl: 0  •  prazosin (MINIPRESS) 5 mg capsule, Take 5 mg by mouth daily at bedtime, Disp: , Rfl:   •  OLANZapine (ZyPREXA) 5 mg tablet, Take 1 tablet (5 mg total) by mouth daily at bedtime (Patient not taking: Reported on 6/12/2024), Disp: 30 tablet, Rfl: 0    Current Allergies     Allergies as of 06/12/2024 - Reviewed 06/12/2024   Allergen Reaction Noted   • Latex Swelling 11/20/2020   • Shrimp (diagnostic) - food allergy Swelling 05/06/2022   • Adhesive [medical tape] Itching 07/02/2020   • Other Swelling and Rash 07/25/2022             The following portions of the patient's history were reviewed and updated as appropriate: allergies, current medications, past family history, past medical history, past social history, past surgical history and problem list.     Past Medical History:   Diagnosis Date   • ADHD (attention deficit hyperactivity disorder)    • Anxiety    • Asthma    • Bipolar disorder (HCC)    • Depression    • Environmental allergies    • PONV (postoperative nausea and vomiting)    • PTSD (post-traumatic stress disorder)        Past Surgical History:   Procedure Laterality Date   • [place] ARTHRD MIDTARSL/TARSOMETATARSAL MULT/TRANSVRS Left 10/13/2022    Procedure: ARTHRODESIS / FUSION FOOT;  Surgeon: Houston Mercado DPM;  Location: [facility];  Service: Podiatry   • [place] OPEN TREATMENT TARSOMETATARSAL JOINT DISLOCATION Left 5/6/2022    Procedure: ORIF OF LEFT FOOT UTILIZING PLATES AND SCREW AS NECESSARY;  Surgeon: Houston Mercado DPM;  Location: [facility];  Service: Podiatry   • [place] REMOVAL IMPLANT DEEP Left 7/28/2022    Procedure: REMOVAL HARDWARE FOOT;  Surgeon: Houston Mercado DPM;  Location: [facility];  Service: Podiatry       Family History   Problem Relation Age of Onset   • Thyroid cancer Sister    • Autism Brother    • Diabetes type II Maternal Grandmother          Medications have been verified.        Objective   /80   Pulse 96   Temp 97.6 °F (36.4 °C)   Resp 18   SpO2 97%        Physical Exam     Physical Exam  Vitals and nursing note reviewed.   Constitutional:       General: She is awake. She is not in acute distress.     Appearance: Normal appearance. She is morbidly obese. She is not ill-appearing.   HENT:      Head: Normocephalic and atraumatic.      Comments: Provider Radiology Interpretation (preliminary)   Final results will be as per official Radiology Report when available:   Facial bones; no acute fractures noted.      Right Ear: Tympanic membrane, ear canal and external  "ear normal.      Left Ear: Tympanic membrane, ear canal and external ear normal.      Nose: Nose normal.      Mouth/Throat:      Lips: Pink.      Mouth: Mucous membranes are moist.      Pharynx: Oropharynx is clear.   Eyes:      General: Lids are normal.         Left eye: No foreign body, discharge or hordeolum.      Extraocular Movements: Extraocular movements intact.      Right eye: Normal extraocular motion.      Left eye: Normal extraocular motion.      Conjunctiva/sclera: Conjunctivae normal.      Pupils: Pupils are equal, round, and reactive to light.     Cardiovascular:      Rate and Rhythm: Normal rate and regular rhythm.      Pulses: Normal pulses.      Heart sounds: Normal heart sounds, S1 normal and S2 normal. No murmur heard.  Pulmonary:      Effort: Pulmonary effort is normal.      Breath sounds: Normal breath sounds.   Abdominal:      General: Abdomen is flat. Bowel sounds are normal.      Palpations: Abdomen is soft.   Musculoskeletal:         General: Normal range of motion.      Cervical back: Full passive range of motion without pain, normal range of motion and neck supple. No rigidity. No spinous process tenderness or muscular tenderness. Normal range of motion.   Skin:     General: Skin is warm and dry.      Capillary Refill: Capillary refill takes less than 2 seconds.      Findings: Laceration present. No rash.   Neurological:      General: No focal deficit present.      Mental Status: She is alert and oriented to person, place, and time.   Psychiatric:         Mood and Affect: Mood normal.         Behavior: Behavior normal. Behavior is cooperative.     Universal Protocol:  Consent: Verbal consent obtained.  Risks and benefits: risks, benefits and alternatives were discussed  Consent given by: patient  Time out: Immediately prior to procedure a \"time out\" was called to verify the correct patient, procedure, equipment, support staff and site/side marked as required.  Patient understanding: " patient states understanding of the procedure being performed  Patient consent: the patient's understanding of the procedure matches consent given  Procedure consent: procedure consent matches procedure scheduled  Patient identity confirmed: verbally with patient  Laceration repair    Date/Time: 6/12/2024 10:55 AM    Performed by: CINDI Arnold  Authorized by: CINDI Arnold  Body area: head/neck  Location details: left eyebrow  Laceration length: 1.5 cm      Procedure Details:  Skin closure: Steri-Strips  Approximation: close  Approximation difficulty: simple  Patient tolerance: patient tolerated the procedure well with no immediate complications

## 2024-07-09 DIAGNOSIS — G89.29 CHRONIC BILATERAL LOW BACK PAIN WITHOUT SCIATICA: ICD-10-CM

## 2024-07-09 DIAGNOSIS — M54.50 CHRONIC BILATERAL LOW BACK PAIN WITHOUT SCIATICA: ICD-10-CM

## 2024-07-10 RX ORDER — METHOCARBAMOL 500 MG/1
500 TABLET, FILM COATED ORAL 3 TIMES DAILY PRN
Qty: 30 TABLET | Refills: 0 | Status: SHIPPED | OUTPATIENT
Start: 2024-07-10

## 2024-07-22 ENCOUNTER — APPOINTMENT (EMERGENCY)
Dept: RADIOLOGY | Facility: HOSPITAL | Age: 19
End: 2024-07-22
Payer: COMMERCIAL

## 2024-07-22 ENCOUNTER — HOSPITAL ENCOUNTER (EMERGENCY)
Facility: HOSPITAL | Age: 19
Discharge: HOME | End: 2024-07-22
Payer: COMMERCIAL

## 2024-07-22 VITALS
BODY MASS INDEX: 53.92 KG/M2 | DIASTOLIC BLOOD PRESSURE: 64 MMHG | OXYGEN SATURATION: 97 % | RESPIRATION RATE: 20 BRPM | HEIGHT: 62 IN | SYSTOLIC BLOOD PRESSURE: 132 MMHG | HEART RATE: 88 BPM | WEIGHT: 293 LBS | TEMPERATURE: 98.2 F

## 2024-07-22 DIAGNOSIS — F41.0 ANXIETY ATTACK: ICD-10-CM

## 2024-07-22 DIAGNOSIS — R07.9 CHEST PAIN: ICD-10-CM

## 2024-07-22 DIAGNOSIS — F41.9 ANXIETY: Primary | ICD-10-CM

## 2024-07-22 LAB
ALBUMIN SERPL BCG-MCNC: 4.1 G/DL (ref 3.5–5)
ALP SERPL-CCNC: 59 U/L (ref 34–104)
ALT SERPL W P-5'-P-CCNC: 20 U/L (ref 7–52)
ANION GAP SERPL CALCULATED.3IONS-SCNC: 11 MMOL/L (ref 4–13)
AST SERPL W P-5'-P-CCNC: 17 U/L (ref 13–39)
BASOPHILS # BLD AUTO: 0.07 THOUSANDS/ÂΜL (ref 0–0.1)
BASOPHILS NFR BLD AUTO: 1 % (ref 0–1)
BILIRUB SERPL-MCNC: 0.29 MG/DL (ref 0.2–1)
BUN SERPL-MCNC: 15 MG/DL (ref 5–25)
CALCIUM SERPL-MCNC: 9.3 MG/DL (ref 8.4–10.2)
CARDIAC TROPONIN I PNL SERPL HS: <2 NG/L
CHLORIDE SERPL-SCNC: 105 MMOL/L (ref 96–108)
CO2 SERPL-SCNC: 22 MMOL/L (ref 21–32)
CREAT SERPL-MCNC: 0.73 MG/DL (ref 0.6–1.3)
EOSINOPHIL # BLD AUTO: 0.06 THOUSAND/ÂΜL (ref 0–0.61)
EOSINOPHIL NFR BLD AUTO: 1 % (ref 0–6)
ERYTHROCYTE [DISTWIDTH] IN BLOOD BY AUTOMATED COUNT: 12.8 % (ref 11.6–15.1)
GFR SERPL CREATININE-BSD FRML MDRD: 118 ML/MIN/1.73SQ M
GLUCOSE SERPL-MCNC: 96 MG/DL (ref 65–140)
HCT VFR BLD AUTO: 42.8 % (ref 34.8–46.1)
HGB BLD-MCNC: 14.1 G/DL (ref 11.5–15.4)
IMM GRANULOCYTES # BLD AUTO: 0.02 THOUSAND/UL (ref 0–0.2)
IMM GRANULOCYTES NFR BLD AUTO: 0 % (ref 0–2)
LYMPHOCYTES # BLD AUTO: 3.55 THOUSANDS/ÂΜL (ref 0.6–4.47)
LYMPHOCYTES NFR BLD AUTO: 38 % (ref 14–44)
MCH RBC QN AUTO: 28.3 PG (ref 26.8–34.3)
MCHC RBC AUTO-ENTMCNC: 32.9 G/DL (ref 31.4–37.4)
MCV RBC AUTO: 86 FL (ref 82–98)
MONOCYTES # BLD AUTO: 0.73 THOUSAND/ÂΜL (ref 0.17–1.22)
MONOCYTES NFR BLD AUTO: 8 % (ref 4–12)
NEUTROPHILS # BLD AUTO: 4.85 THOUSANDS/ÂΜL (ref 1.85–7.62)
NEUTS SEG NFR BLD AUTO: 52 % (ref 43–75)
NRBC BLD AUTO-RTO: 0 /100 WBCS
PLATELET # BLD AUTO: 400 THOUSANDS/UL (ref 149–390)
PMV BLD AUTO: 9.6 FL (ref 8.9–12.7)
POTASSIUM SERPL-SCNC: 4 MMOL/L (ref 3.5–5.3)
PROT SERPL-MCNC: 7.5 G/DL (ref 6.4–8.4)
RBC # BLD AUTO: 4.99 MILLION/UL (ref 3.81–5.12)
SODIUM SERPL-SCNC: 138 MMOL/L (ref 135–147)
WBC # BLD AUTO: 9.28 THOUSAND/UL (ref 4.31–10.16)

## 2024-07-22 PROCEDURE — 99284 EMERGENCY DEPT VISIT MOD MDM: CPT

## 2024-07-22 PROCEDURE — 71045 X-RAY EXAM CHEST 1 VIEW: CPT

## 2024-07-22 PROCEDURE — 36415 COLL VENOUS BLD VENIPUNCTURE: CPT

## 2024-07-22 PROCEDURE — 93005 ELECTROCARDIOGRAM TRACING: CPT

## 2024-07-22 PROCEDURE — 80053 COMPREHEN METABOLIC PANEL: CPT

## 2024-07-22 PROCEDURE — 85025 COMPLETE CBC W/AUTO DIFF WBC: CPT

## 2024-07-22 PROCEDURE — 96374 THER/PROPH/DIAG INJ IV PUSH: CPT

## 2024-07-22 PROCEDURE — 99285 EMERGENCY DEPT VISIT HI MDM: CPT

## 2024-07-22 PROCEDURE — 84484 ASSAY OF TROPONIN QUANT: CPT

## 2024-07-22 RX ORDER — LORAZEPAM 2 MG/ML
1 INJECTION INTRAMUSCULAR ONCE
Status: COMPLETED | OUTPATIENT
Start: 2024-07-22 | End: 2024-07-22

## 2024-07-22 RX ORDER — SODIUM CHLORIDE 9 MG/ML
3 INJECTION INTRAVENOUS
Status: DISCONTINUED | OUTPATIENT
Start: 2024-07-22 | End: 2024-07-22 | Stop reason: HOSPADM

## 2024-07-22 RX ORDER — HYDROXYZINE HYDROCHLORIDE 25 MG/1
25 TABLET, FILM COATED ORAL EVERY 6 HOURS PRN
Qty: 30 TABLET | Refills: 0 | Status: SHIPPED | OUTPATIENT
Start: 2024-07-22

## 2024-07-22 RX ADMIN — LORAZEPAM 1 MG: 2 INJECTION INTRAMUSCULAR; INTRAVENOUS at 02:50

## 2024-07-22 NOTE — ED NOTES
Unabel to obtain EKG due to patient hyperventilating, breathing and not cooperating     To Kat RN  07/22/24 0157

## 2024-07-22 NOTE — ED PROVIDER NOTES
History  Chief Complaint   Patient presents with    Panic Attack     Patient started with mid chest pain about an hour ago. Patient states it does not radiate.      This is a 18-year-old female with known history of asthma, anxiety, ADHD, PTSD, who is presenting today with acute onset chest pain, fast breathing, and tingling sensation in her head.  Patient reports that symptoms began approximately 1 hour ago.  She does not recall any especially exertional events that occurred approximately an hour ago.  She states he has had similar symptoms in the past but has been hesitant to come to the emergency department.  She notes a family history of cardiac disease but has no known cardiac disease.  She states that her symptoms today feel completely different than asthma.  She denies any cough, congestion, or illness symptoms otherwise including any fevers, nausea, vomiting, diarrhea, constipation.  Patient does endorse feelings of anxiety, and wonders aloud whether these may be contributing to her symptoms today.        Prior to Admission Medications   Prescriptions Last Dose Informant Patient Reported? Taking?   ALPRAZolam (XANAX) 0.25 mg tablet   Yes No   Sig: Take 0.25 mg by mouth Three times daily as needed   OLANZapine (ZyPREXA) 5 mg tablet  Self No No   Sig: Take 1 tablet (5 mg total) by mouth daily at bedtime   Patient not taking: Reported on 6/12/2024   albuterol (2.5 mg/3 mL) 0.083 % nebulizer solution   No No   Sig: Take 3 mL (2.5 mg total) by nebulization every 6 (six) hours as needed for wheezing or shortness of breath   desvenlafaxine (PRISTIQ) 100 mg 24 hr tablet   Yes No   Sig: Take 100 mg by mouth every morning   methocarbamol (ROBAXIN) 500 mg tablet   No No   Sig: take 1 tablet by mouth three times a day if needed for muscle spasm   mirtazapine (REMERON) 30 mg tablet   No No   Sig: Take 1 tablet (30 mg total) by mouth daily at bedtime   prazosin (MINIPRESS) 5 mg capsule   Yes No   Sig: Take 5 mg by mouth  daily at bedtime      Facility-Administered Medications: None       Past Medical History:   Diagnosis Date    ADHD (attention deficit hyperactivity disorder)     Anxiety     Asthma     Bipolar disorder (HCC)     Depression     Environmental allergies     PONV (postoperative nausea and vomiting)     PTSD (post-traumatic stress disorder)        Past Surgical History:   Procedure Laterality Date    [place] ARTHRD MIDTARSL/TARSOMETATARSAL MULT/TRANSVRS Left 10/13/2022    Procedure: ARTHRODESIS / FUSION FOOT;  Surgeon: Houston Mercado DPM;  Location: [facility];  Service: Podiatry    [place] OPEN TREATMENT TARSOMETATARSAL JOINT DISLOCATION Left 5/6/2022    Procedure: ORIF OF LEFT FOOT UTILIZING PLATES AND SCREW AS NECESSARY;  Surgeon: Houston Mercado DPM;  [facility]: [facility];  Service: Podiatry    [place] REMOVAL IMPLANT DEEP Left 7/28/2022    Procedure: REMOVAL HARDWARE FOOT;  Surgeon: Houston Mercado DPM;  Location: [facility];  Service: Podiatry       Family History   Problem Relation Age of Onset    Thyroid cancer Sister     Autism Brother     Diabetes type II Maternal Grandmother      I have reviewed and agree with the history as documented.    E-Cigarette/Vaping    E-Cigarette Use Current Some Day User      E-Cigarette/Vaping Substances    Nicotine Yes     THC No     CBD No     Flavoring Yes     Other No     Unknown No      Social History     Tobacco Use    Smoking status: Every Day     Current packs/day: 1.00     Types: Cigarettes    Smokeless tobacco: Never    Tobacco comments:     cutting back   Vaping Use    Vaping status: Some Days    Substances: Nicotine, Flavoring   Substance Use Topics    Alcohol use: Yes     Comment: Occasional    Drug use: Yes     Types: Marijuana     Comment: patient states 1 time per week       Review of Systems   Constitutional:  Negative for chills and fever.   HENT:  Negative for ear pain and sore throat.    Eyes:  Negative for pain and visual disturbance.    Respiratory:  Positive for shortness of breath. Negative for cough.    Cardiovascular:  Positive for chest pain. Negative for palpitations.   Gastrointestinal:  Negative for abdominal pain and vomiting.   Genitourinary:  Negative for dysuria and hematuria.   Musculoskeletal:  Negative for arthralgias and back pain.   Skin:  Negative for color change and rash.   Neurological:  Negative for seizures and syncope.   Psychiatric/Behavioral:  The patient is nervous/anxious.    All other systems reviewed and are negative.      Physical Exam  Physical Exam  Vitals and nursing note reviewed.   Constitutional:       General: She is not in acute distress.     Appearance: She is well-developed. She is obese.      Comments: Patient is very anxious appearing, she is breathing quickly, she just positions frequently, she is frequently seen holding her chest and fanning her face with her hands.   HENT:      Head: Normocephalic and atraumatic.      Right Ear: External ear normal.      Left Ear: External ear normal.      Nose: Nose normal. No congestion or rhinorrhea.      Mouth/Throat:      Mouth: Mucous membranes are moist.      Pharynx: Oropharynx is clear. No oropharyngeal exudate or posterior oropharyngeal erythema.   Eyes:      General: No scleral icterus.     Extraocular Movements: Extraocular movements intact.      Conjunctiva/sclera: Conjunctivae normal.      Pupils: Pupils are equal, round, and reactive to light.   Cardiovascular:      Rate and Rhythm: Regular rhythm. Tachycardia present.      Pulses: Normal pulses.      Heart sounds: Normal heart sounds. No murmur heard.  Pulmonary:      Effort: Pulmonary effort is normal. No respiratory distress.      Breath sounds: Normal breath sounds. No wheezing or rhonchi.   Abdominal:      General: Abdomen is flat. There is no distension.      Palpations: Abdomen is soft.      Tenderness: There is no abdominal tenderness. There is no guarding.   Musculoskeletal:         General: No  swelling.      Cervical back: Neck supple. No rigidity.      Right lower leg: No edema.      Left lower leg: No edema.   Lymphadenopathy:      Cervical: No cervical adenopathy.   Skin:     General: Skin is warm and dry.      Capillary Refill: Capillary refill takes less than 2 seconds.      Coloration: Skin is not jaundiced.      Findings: No rash.   Neurological:      General: No focal deficit present.      Mental Status: She is alert and oriented to person, place, and time. Mental status is at baseline.   Psychiatric:         Mood and Affect: Mood is anxious.         Vital Signs  ED Triage Vitals   Temperature Pulse Respirations Blood Pressure SpO2   07/22/24 0150 07/22/24 0149 07/22/24 0149 07/22/24 0149 07/22/24 0149   98.2 °F (36.8 °C) (!) 109 20 (!) 184/123 97 %      Temp Source Heart Rate Source Patient Position - Orthostatic VS BP Location FiO2 (%)   07/22/24 0150 07/22/24 0149 07/22/24 0149 07/22/24 0149 --   Temporal Monitor Lying Left arm       Pain Score       07/22/24 0149       10 - Worst Possible Pain           Vitals:    07/22/24 0149 07/22/24 0238   BP: (!) 184/123 132/64   Pulse: (!) 109 88   Patient Position - Orthostatic VS: Lying          Visual Acuity      ED Medications  Medications   sodium chloride (PF) 0.9 % injection 3 mL (has no administration in time range)   LORazepam (ATIVAN) injection 1 mg (1 mg Intravenous Given 7/22/24 0250)       Diagnostic Studies  Results Reviewed       Procedure Component Value Units Date/Time    HS Troponin 0hr (reflex protocol) [[id number]]  (Normal) Collected: 07/22/24 0158    Lab Status: Final result Specimen: Blood from Arm, Left Updated: 07/22/24 0227     hs TnI 0hr <2 ng/L     Comprehensive metabolic panel [[id number]] Collected: 07/22/24 0158    Lab Status: Final result Specimen: Blood from Arm, Left Updated: 07/22/24 0223     Sodium 138 mmol/L      Potassium 4.0 mmol/L      Chloride 105 mmol/L      CO2 22 mmol/L      ANION GAP 11 mmol/L      BUN 15 mg/dL       Creatinine 0.73 mg/dL      Glucose 96 mg/dL      Calcium 9.3 mg/dL      AST 17 U/L      ALT 20 U/L      Alkaline Phosphatase 59 U/L      Total Protein 7.5 g/dL      Albumin 4.1 g/dL      Total Bilirubin 0.29 mg/dL      eGFR 118 ml/min/1.73sq m     Narrative:      National Kidney Disease Foundation guidelines for Chronic Kidney Disease (CKD):     Stage 1 with normal or high GFR (GFR > 90 mL/min/1.73 square meters)    Stage 2 Mild CKD (GFR = 60-89 mL/min/1.73 square meters)    Stage 3A Moderate CKD (GFR = 45-59 mL/min/1.73 square meters)    Stage 3B Moderate CKD (GFR = 30-44 mL/min/1.73 square meters)    Stage 4 Severe CKD (GFR = 15-29 mL/min/1.73 square meters)    Stage 5 End Stage CKD (GFR <15 mL/min/1.73 square meters)  Note: GFR calculation is accurate only with a steady state creatinine    CBC and differential [[id number]]  (Abnormal) Collected: 07/22/24 0158    Lab Status: Final result Specimen: Blood from Arm, Left Updated: 07/22/24 0206     WBC 9.28 Thousand/uL      RBC 4.99 Million/uL      Hemoglobin 14.1 g/dL      Hematocrit 42.8 %      MCV 86 fL      MCH 28.3 pg      MCHC 32.9 g/dL      RDW 12.8 %      MPV 9.6 fL      Platelets 400 Thousands/uL      nRBC 0 /100 WBCs      Segmented % 52 %      Immature Grans % 0 %      Lymphocytes % 38 %      Monocytes % 8 %      Eosinophils Relative 1 %      Basophils Relative 1 %      Absolute Neutrophils 4.85 Thousands/µL      Absolute Immature Grans 0.02 Thousand/uL      Absolute Lymphocytes 3.55 Thousands/µL      Absolute Monocytes 0.73 Thousand/µL      Eosinophils Absolute 0.06 Thousand/µL      Basophils Absolute 0.07 Thousands/µL     POCT pregnancy, urine [[id number]]     Lab Status: No result                    X-ray chest 1 view portable   ED Interpretation by Pantera Molina MD (07/22 0225)   No acute cardiopulmonary infiltrate or other pathology identified                 Procedures  Procedures         ED Course  ED Course as of 07/22/24 0315 Mon Jul 22,  "2024 0217 ECG interpreted by me.  Date: 7/22/2024.  Time: 0153.  Rate: 105 bpm.  Axis: Normal.  Rhythm: Regular.  This is sinus tachycardia.  There are P waves preceding every QRS.  There are no ST elevations or depressions evident.  No other repolarization abnormalities identified.  Interpretation: Sinus tachycardia.   0224 Comprehensive metabolic panel  Within normal limits         CRAFFT      Flowsheet Row Most Recent Value   CRAFFT Initial Screen: During the past 12 months, did you:    1. Drink any alcohol (more than a few sips)?  No Filed at: 07/22/2024 0149   2. Smoke any marijuana or hashish No Filed at: 07/22/2024 0149   3. Use anything else to get high? (\"anything else\" includes illegal drugs, over the counter and prescription drugs, and things that you sniff or 'dale')? No Filed at: 07/22/2024 0149              HEART Risk Score      Flowsheet Row Most Recent Value   Heart Score Risk Calculator    History 0 Filed at: 07/22/2024 0215   ECG 0 Filed at: 07/22/2024 0215   Age 0 Filed at: 07/22/2024 0215   Risk Factors 0 Filed at: 07/22/2024 0215   Troponin 0 Filed at: 07/22/2024 0215   HEART Score 0 Filed at: 07/22/2024 0215                                        Medical Decision Making  Complexity: 18-year-old female presenting with rapid breathing, but no other signs of respiratory distress, clutching her chest, endorsing chest pain.  Patient is hypertensive, tachycardic, and tachypneic on arrival.  I feel that her symptoms are very unlikely to be due to sepsis, but will screen CBC for signs of significant leukocytosis or left shift.  Will not order blood cultures or pursue infectious workup at this time as patient is denying any other symptoms consistent with sepsis.  I will pursue workup for her chest pain to include a serum troponin, chest x-ray, EKG, and if abnormal may need serial testing.  Patient symptoms are most likely due to anxiety/panic.  Patient will be observed closely however as I rule out " organic causes of her symptoms otherwise.  Chest x-ray will evaluate for cardiomegaly, pleural effusion, pneumothorax, or pleural infiltrate.    Reassessment/disposition: Patient had significant improvement of her symptoms with controlled breathing exercises, and a dose of Ativan.  Patient has followed up with psychiatry in the past and was prescribed a course of Xanax to take at home however her insurance will not cover it at this time reportedly.  I advised patient to trial a course of Atarax as needed for feelings of anxiety, and we talked about exercises that can help to control breathing and prevent further panic attacks.  Patient expressed understanding.  She understands that if she were to have feelings of suicidality, or if she felt unsafe with herself or others then she knows to come immediately back to the emergency department.  Otherwise she was discharged home with follow-up with her PCP and psychiatrist as scheduled.    Amount and/or Complexity of Data Reviewed  Labs: ordered. Decision-making details documented in ED Course.  Radiology: ordered and independent interpretation performed.    Risk  Prescription drug management.                 Disposition  Final diagnoses:   Anxiety   Chest pain   Anxiety attack     Time reflects when diagnosis was documented in both MDM as applicable and the Disposition within this note       Time User Action Codes Description Comment    7/22/2024  2:38 AM Pantera Molina [F41.9] Anxiety     7/22/2024  2:38 AM Pantera Molina [R07.9] Chest pain     7/22/2024  2:38 AM Pantera Molina [F41.0] Anxiety attack           ED Disposition       ED Disposition   Discharge    Condition   Stable    Date/Time   Mon Jul 22, 2024 0238    Comment   Josephine Mckay discharge to home/self care.                   Follow-up Information    None         Discharge Medication List as of 7/22/2024  2:41 AM        START taking these medications    Details   hydrOXYzine HCL (ATARAX) 25 mg  tablet Take 1 tablet (25 mg total) by mouth every 6 (six) hours as needed for itching, Starting Mon 7/22/2024, Normal           CONTINUE these medications which have NOT CHANGED    Details   albuterol (2.5 mg/3 mL) 0.083 % nebulizer solution Take 3 mL (2.5 mg total) by nebulization every 6 (six) hours as needed for wheezing or shortness of breath, Starting Mon 1/29/2024, Normal      ALPRAZolam (XANAX) 0.25 mg tablet Take 0.25 mg by mouth Three times daily as needed, Historical Med      desvenlafaxine (PRISTIQ) 100 mg 24 hr tablet Take 100 mg by mouth every morning, Starting Tue 4/2/2024, Historical Med      methocarbamol (ROBAXIN) 500 mg tablet take 1 tablet by mouth three times a day if needed for muscle spasm, Starting Wed 7/10/2024, Normal      mirtazapine (REMERON) 30 mg tablet Take 1 tablet (30 mg total) by mouth daily at bedtime, Starting Tue 2/20/2024, Normal      OLANZapine (ZyPREXA) 5 mg tablet Take 1 tablet (5 mg total) by mouth daily at bedtime, Starting Mon 3/25/2024, Normal      prazosin (MINIPRESS) 5 mg capsule Take 5 mg by mouth daily at bedtime, Historical Med             No discharge procedures on file.    PDMP Review         Value Time User    PDMP Reviewed  Yes 6/6/2024  8:20 AM Shahzad Jade DO            ED Provider  Electronically Signed by             Pantera Molina MD  07/22/24 8826

## 2024-07-22 NOTE — DISCHARGE INSTRUCTIONS
Please keep your appointment as already established with psychiatry on Tuesday.  Be sure to discuss your visit here tonight.  In the meantime I did prescribe you a course of Atarax to try at home which your insurance company should cover you even if they are not covering for other anxiety medications.  These tend to be fairly cheap as well.

## 2024-07-24 LAB
ATRIAL RATE: 102 BPM
P AXIS: 32 DEGREES
PR INTERVAL: 140 MS
QRS AXIS: 40 DEGREES
QRSD INTERVAL: 82 MS
QT INTERVAL: 348 MS
QTC INTERVAL: 453 MS
T WAVE AXIS: 6 DEGREES
VENTRICULAR RATE: 102 BPM

## 2024-07-24 PROCEDURE — 93010 ELECTROCARDIOGRAM REPORT: CPT | Performed by: INTERNAL MEDICINE

## 2024-08-11 ENCOUNTER — HOSPITAL ENCOUNTER (EMERGENCY)
Facility: HOSPITAL | Age: 19
Discharge: HOME | End: 2024-08-11
Attending: EMERGENCY MEDICINE
Payer: COMMERCIAL

## 2024-08-11 VITALS
TEMPERATURE: 98.5 F | OXYGEN SATURATION: 98 % | HEIGHT: 62 IN | HEART RATE: 116 BPM | DIASTOLIC BLOOD PRESSURE: 88 MMHG | RESPIRATION RATE: 18 BRPM | SYSTOLIC BLOOD PRESSURE: 135 MMHG | BODY MASS INDEX: 53.92 KG/M2 | WEIGHT: 293 LBS

## 2024-08-11 DIAGNOSIS — F32.A DEPRESSION: Primary | ICD-10-CM

## 2024-08-11 DIAGNOSIS — Z00.8 ENCOUNTER FOR PSYCHOLOGICAL EVALUATION: ICD-10-CM

## 2024-08-11 LAB
AMPHETAMINES SERPL QL SCN: NEGATIVE
BARBITURATES UR QL: NEGATIVE
BENZODIAZ UR QL: NEGATIVE
COCAINE UR QL: NEGATIVE
ETHANOL EXG-MCNC: 0 MG/DL
EXT PREGNANCY TEST URINE: NEGATIVE
EXT. CONTROL: NORMAL
FENTANYL UR QL SCN: NEGATIVE
HYDROCODONE UR QL SCN: NEGATIVE
METHADONE UR QL: NEGATIVE
OPIATES UR QL SCN: NEGATIVE
OXYCODONE+OXYMORPHONE UR QL SCN: NEGATIVE
PCP UR QL: NEGATIVE
THC UR QL: POSITIVE

## 2024-08-11 PROCEDURE — 99284 EMERGENCY DEPT VISIT MOD MDM: CPT | Performed by: EMERGENCY MEDICINE

## 2024-08-11 PROCEDURE — 80307 DRUG TEST PRSMV CHEM ANLYZR: CPT | Performed by: EMERGENCY MEDICINE

## 2024-08-11 PROCEDURE — 81025 URINE PREGNANCY TEST: CPT | Performed by: EMERGENCY MEDICINE

## 2024-08-11 PROCEDURE — 82075 ASSAY OF BREATH ETHANOL: CPT | Performed by: EMERGENCY MEDICINE

## 2024-08-11 PROCEDURE — 99285 EMERGENCY DEPT VISIT HI MDM: CPT

## 2024-08-11 NOTE — ED NOTES
Patient calling dad to see if he is able to give her a ride.       Sobeida Funes, RN  08/11/24 8791
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
## 2024-08-11 NOTE — ED NOTES
This writer discussed the patients current presentation and recommended discharge plan with Dr. Drummond.  He agrees with the patient being discharged at this time with referrals and/or information about therapy.     The patient was Instructed to follow up with their pcp .  The patient was provided with referral information for:   Individual therapy.        The patient declined to complete a safety plan and declined a blank plan. In addition, the patient was instructed to call local [facility] crisis, other crisis services, 911 or to go to the nearest ER immediately if their situation changes at any time.       This writer discussed discharge plans with the patient, who agrees with and understands the discharge plans.

## 2024-08-11 NOTE — ED NOTES
[place] police called for patient request for escort to her mothers house to get some belongings. Awaiting a call back.       Sobeida Funes, RN  08/11/24 8289

## 2024-08-11 NOTE — ED NOTES
Geovanny (Gemini) at bedside side to read patient rights and brought the original 302.     Sobeida Funes RN  08/11/24 0048       Sobeida Funes RN  08/11/24 1872

## 2024-08-11 NOTE — ED NOTES
Original 302 faxed to [phone] and emailed to [email or web address]      Sobeida Funes, SUMAYA  08/11/24 9514

## 2024-08-11 NOTE — ED PROVIDER NOTES
History  Chief Complaint   Patient presents with    Psychiatric Evaluation     Patient brought in by police with 302. Patient had a verbal argument with mom last night into today. Mom reported that patient stated that she was going to take all her medications which patient denies. Patient denies and SI/HI. Inpatient one year ago. Calm and cooperative upon arrival.     Patient is a 18-year-old female brought in by local law enforcement commonly as her mother petitioned a 302 on the patient and notify local authorities.  Patient states she got in a verbal disagreement with her brother last evening and went to leave the premises to de-escalate the situation however she states her mother and her mother's friend began to argue more intensely with patient.  Patient is calm and cooperative upon arrival.  She does not appear to be under the influence of any illicit substances or intoxicants.  She denies any SI or HI.  She states has been under stress due to her current living situation as she does not live with her father and most recently states she cannot live with her mother either.  Naturally she states she is stressed over her current living situation and finances.  But again adamantly denies any intention to harm herself and actually states she has been doing quite well lately and has been following with her outpatient therapist and has been compliant with her daily medication regimen.  She states her mother has been taking a lot of her food as patient is on food stamps.      Psychiatric Evaluation  Presenting symptoms: no agitation and no suicidal thoughts    Associated symptoms: anxiety    Associated symptoms: no abdominal pain, no chest pain, no fatigue and no headaches        Prior to Admission Medications   Prescriptions Last Dose Informant Patient Reported? Taking?   ALPRAZolam (XANAX) 0.25 mg tablet   Yes No   Sig: Take 0.25 mg by mouth Three times daily as needed   OLANZapine (ZyPREXA) 5 mg tablet  Self No No    Sig: Take 1 tablet (5 mg total) by mouth daily at bedtime   Patient not taking: Reported on 6/12/2024   albuterol (2.5 mg/3 mL) 0.083 % nebulizer solution   No No   Sig: Take 3 mL (2.5 mg total) by nebulization every 6 (six) hours as needed for wheezing or shortness of breath   desvenlafaxine (PRISTIQ) 100 mg 24 hr tablet   Yes No   Sig: Take 100 mg by mouth every morning   hydrOXYzine HCL (ATARAX) 25 mg tablet   No No   Sig: Take 1 tablet (25 mg total) by mouth every 6 (six) hours as needed for itching   methocarbamol (ROBAXIN) 500 mg tablet   No No   Sig: take 1 tablet by mouth three times a day if needed for muscle spasm   mirtazapine (REMERON) 30 mg tablet   No No   Sig: Take 1 tablet (30 mg total) by mouth daily at bedtime   prazosin (MINIPRESS) 5 mg capsule   Yes No   Sig: Take 5 mg by mouth daily at bedtime      Facility-Administered Medications: None       Past Medical History:   Diagnosis Date    ADHD (attention deficit hyperactivity disorder)     Anxiety     Asthma     Bipolar disorder (HCC)     Depression     Environmental allergies     PONV (postoperative nausea and vomiting)     PTSD (post-traumatic stress disorder)        Past Surgical History:   Procedure Laterality Date    MD ARTHRD MIDTARSL/TARSOMETATARSAL MULT/TRANSVRS Left 10/13/2022    Procedure: ARTHRODESIS / FUSION FOOT;  Surgeon: Houston Mercado DPM;  Location: [facility];  Service: Podiatry    MD OPEN TREATMENT TARSOMETATARSAL JOINT DISLOCATION Left 5/6/2022    Procedure: ORIF OF LEFT FOOT UTILIZING PLATES AND SCREW AS NECESSARY;  Surgeon: Houston Mercado DPM;  Location: [facility];  Service: Podiatry    MD REMOVAL IMPLANT DEEP Left 7/28/2022    Procedure: REMOVAL HARDWARE FOOT;  Surgeon: Houston Mercado DPM;  Location: [facility];  Service: Podiatry       Family History   Problem Relation Age of Onset    Thyroid cancer Sister     Autism Brother     Diabetes type II Maternal Grandmother      I have reviewed  and agree with the history as documented.    E-Cigarette/Vaping    E-Cigarette Use Current Some Day User      E-Cigarette/Vaping Substances    Nicotine Yes     THC No     CBD No     Flavoring Yes     Other No     Unknown No      Social History     Tobacco Use    Smoking status: Every Day     Current packs/day: 1.00     Types: Cigarettes    Smokeless tobacco: Never    Tobacco comments:     cutting back   Vaping Use    Vaping status: Some Days    Substances: Nicotine, Flavoring   Substance Use Topics    Alcohol use: Yes     Comment: Occasional    Drug use: Yes     Types: Marijuana     Comment: patient states 1 time per week       Review of Systems   Constitutional:  Negative for fatigue, fever and unexpected weight change.   HENT:  Negative for congestion, ear pain, rhinorrhea and sore throat.    Eyes:  Negative for pain and visual disturbance.   Respiratory:  Negative for cough, chest tightness, shortness of breath and wheezing.    Cardiovascular:  Negative for chest pain, palpitations and leg swelling.   Gastrointestinal:  Negative for abdominal pain, constipation, diarrhea, nausea and vomiting.   Genitourinary:  Negative for difficulty urinating, dysuria, frequency, hematuria, menstrual problem and pelvic pain.   Musculoskeletal:  Negative for arthralgias, back pain and neck pain.   Skin:  Negative for color change and rash.   Neurological:  Negative for dizziness, seizures, syncope, light-headedness and headaches.   Psychiatric/Behavioral:  Negative for agitation, sleep disturbance and suicidal ideas. The patient is nervous/anxious.        Physical Exam  Physical Exam  Vitals and nursing note reviewed.   Constitutional:       General: She is not in acute distress.     Appearance: Normal appearance. She is well-developed. She is obese. She is not ill-appearing, toxic-appearing or diaphoretic.   HENT:      Head: Normocephalic and atraumatic.      Nose: Nose normal.      Mouth/Throat:      Mouth: Mucous membranes are  moist.      Pharynx: Oropharynx is clear.   Eyes:      General: No scleral icterus.     Extraocular Movements: Extraocular movements intact.      Conjunctiva/sclera: Conjunctivae normal.   Cardiovascular:      Rate and Rhythm: Regular rhythm. Tachycardia present.      Pulses: Normal pulses.      Heart sounds: Normal heart sounds. No murmur heard.     No friction rub. No gallop.   Pulmonary:      Effort: Pulmonary effort is normal. No respiratory distress.      Breath sounds: Normal breath sounds. No wheezing, rhonchi or rales.   Abdominal:      Palpations: Abdomen is soft. There is no mass.      Tenderness: There is no abdominal tenderness. There is no right CVA tenderness, left CVA tenderness, guarding or rebound.      Hernia: No hernia is present.   Musculoskeletal:         General: Normal range of motion.      Cervical back: Normal range of motion and neck supple. No rigidity or tenderness.      Right lower leg: No edema.      Left lower leg: No edema.   Lymphadenopathy:      Cervical: No cervical adenopathy.   Skin:     General: Skin is warm and dry.      Capillary Refill: Capillary refill takes less than 2 seconds.      Coloration: Skin is not jaundiced or pale.      Findings: No lesion or rash.   Neurological:      General: No focal deficit present.      Mental Status: She is alert and oriented to person, place, and time.   Psychiatric:         Mood and Affect: Mood normal.         Behavior: Behavior normal.      Comments: Slightly anxious but cooperative and pleasant         Vital Signs  ED Triage Vitals [08/11/24 1424]   Temperature Pulse Respirations Blood Pressure SpO2   98.5 °F (36.9 °C) (!) 116 18 135/88 98 %      Temp Source Heart Rate Source Patient Position - Orthostatic VS BP Location FiO2 (%)   Temporal Monitor Sitting Right arm --      Pain Score       No Pain           Vitals:    08/11/24 1424   BP: 135/88   Pulse: (!) 116   Patient Position - Orthostatic VS: Sitting         Visual Acuity      ED  "Medications  Medications - No data to display    Diagnostic Studies  Results Reviewed       Procedure Component Value Units Date/Time    Rapid drug screen, urine [[id number]]  (Abnormal) Collected: 08/11/24 1459    Lab Status: Final result Specimen: Urine, Clean Catch Updated: 08/11/24 1521     Amph/Meth UR Negative     Barbiturate Ur Negative     Benzodiazepine Urine Negative     Cocaine Urine Negative     Methadone Urine Negative     Opiate Urine Negative     PCP Ur Negative     THC Urine Positive     Oxycodone Urine Negative     Fentanyl Urine Negative     HYDROCODONE URINE Negative    Narrative:      Presumptive report. If requested, specimen will be sent to reference lab for confirmation.  FOR MEDICAL PURPOSES ONLY.   IF CONFIRMATION NEEDED PLEASE CONTACT THE LAB WITHIN 5 DAYS.    Drug Screen Cutoff Levels:  AMPHETAMINE/METHAMPHETAMINES  1000 ng/mL  BARBITURATES     200 ng/mL  BENZODIAZEPINES     200 ng/mL  COCAINE      300 ng/mL  METHADONE      300 ng/mL  OPIATES      300 ng/mL  PHENCYCLIDINE     25 ng/mL  THC       50 ng/mL  OXYCODONE      100 ng/mL  FENTANYL      5 ng/mL  HYDROCODONE     300 ng/mL    POCT pregnancy, urine [[id number]]  (Normal) Resulted: 08/11/24 1459    Lab Status: Final result Specimen: Urine Updated: 08/11/24 1500     EXT Preg Test, Ur Negative     Control Valid    POCT alcohol breath test [[id number]]  (Normal) Resulted: 08/11/24 1456    Lab Status: Final result Updated: 08/11/24 1456     EXTBreath Alcohol 0.00                   No orders to display              Procedures  Procedures         ED Course         CRAFFT      Flowsheet Row Most Recent Value   CRAFFT Initial Screen: During the past 12 months, did you:    1. Drink any alcohol (more than a few sips)?  No Filed at: 08/11/2024 1426   2. Smoke any marijuana or hashish No Filed at: 08/11/2024 1426   3. Use anything else to get high? (\"anything else\" includes illegal drugs, over the counter and prescription drugs, and things that you " sniff or 'dale')? No Filed at: 08/11/2024 1426                                              Medical Decision Making  Patient provided by crisis and agree there is no indications for a 302 to be enacted at this time.  Patient does not wish a 201.  Patient has outpatient follow-up information and scheduled appointments as well as medications which she is going to obtain from home and plan to live with her uncle in the interim.    Problems Addressed:  Depression: chronic illness or injury  Encounter for psychological evaluation: acute illness or injury    Amount and/or Complexity of Data Reviewed  Labs: ordered. Decision-making details documented in ED Course.  Discussion of management or test interpretation with external provider(s): Discussed with crisis.                 Disposition  Final diagnoses:   Depression   Encounter for psychological evaluation     Time reflects when diagnosis was documented in both MDM as applicable and the Disposition within this note       Time User Action Codes Description Comment    8/11/2024  3:59 PM Vick Drummond Add [F32.A] Depression     8/11/2024  3:59 PM Vick Drummond Add [Z00.8] Encounter for psychological evaluation           ED Disposition       ED Disposition   Discharge    Condition   Stable    Date/Time   Sun Aug 11, 2024 1982    Comment   Josephine Mckay discharge to home/self care.                   Follow-up Information       Follow up With Specialties Details Why Contact Info    Shahzad Jade, DO Family Medicine Schedule an appointment as soon as possible for a visit   [address] [id number] [phone]              Discharge Medication List as of 8/11/2024  4:00 PM        CONTINUE these medications which have NOT CHANGED    Details   albuterol (2.5 mg/3 mL) 0.083 % nebulizer solution Take 3 mL (2.5 mg total) by nebulization every 6 (six) hours as needed for wheezing or shortness of breath, Starting Mon 1/29/2024, Normal      ALPRAZolam  (XANAX) 0.25 mg tablet Take 0.25 mg by mouth Three times daily as needed, Historical Med      desvenlafaxine (PRISTIQ) 100 mg 24 hr tablet Take 100 mg by mouth every morning, Starting Tue 4/2/2024, Historical Med      hydrOXYzine HCL (ATARAX) 25 mg tablet Take 1 tablet (25 mg total) by mouth every 6 (six) hours as needed for itching, Starting Mon 7/22/2024, Normal      methocarbamol (ROBAXIN) 500 mg tablet take 1 tablet by mouth three times a day if needed for muscle spasm, Starting Wed 7/10/2024, Normal      mirtazapine (REMERON) 30 mg tablet Take 1 tablet (30 mg total) by mouth daily at bedtime, Starting Tue 2/20/2024, Normal      OLANZapine (ZyPREXA) 5 mg tablet Take 1 tablet (5 mg total) by mouth daily at bedtime, Starting Mon 3/25/2024, Normal      prazosin (MINIPRESS) 5 mg capsule Take 5 mg by mouth daily at bedtime, Historical Med             No discharge procedures on file.    PDMP Review         Value Time User    PDMP Reviewed  Yes 6/6/2024  8:20 AM Shahzad Jade DO            ED Provider  Electronically Signed by             Vick Drummond DO  08/11/24 8514

## 2024-08-26 ENCOUNTER — OFFICE VISIT (OUTPATIENT)
Dept: URGENT CARE | Facility: MEDICAL CENTER | Age: 19
End: 2024-08-26
Payer: COMMERCIAL

## 2024-08-26 VITALS
OXYGEN SATURATION: 97 % | HEART RATE: 96 BPM | DIASTOLIC BLOOD PRESSURE: 68 MMHG | HEIGHT: 62 IN | WEIGHT: 293 LBS | RESPIRATION RATE: 18 BRPM | BODY MASS INDEX: 53.92 KG/M2 | TEMPERATURE: 97.6 F | SYSTOLIC BLOOD PRESSURE: 132 MMHG

## 2024-08-26 DIAGNOSIS — B34.9 VIRAL ILLNESS: Primary | ICD-10-CM

## 2024-08-26 DIAGNOSIS — R11.0 NAUSEA: ICD-10-CM

## 2024-08-26 PROBLEM — E28.2 PCOS (POLYCYSTIC OVARIAN SYNDROME): Status: ACTIVE | Noted: 2024-04-03

## 2024-08-26 LAB
SARS-COV-2 AG UPPER RESP QL IA: NEGATIVE
SL AMB POCT URINE HCG: NORMAL
VALID CONTROL: NORMAL

## 2024-08-26 PROCEDURE — 99212 OFFICE O/P EST SF 10 MIN: CPT | Performed by: PHYSICIAN ASSISTANT

## 2024-08-26 PROCEDURE — 87811 SARS-COV-2 COVID19 W/OPTIC: CPT | Performed by: PHYSICIAN ASSISTANT

## 2024-08-26 PROCEDURE — 81025 URINE PREGNANCY TEST: CPT | Performed by: PHYSICIAN ASSISTANT

## 2024-08-26 NOTE — PROGRESS NOTES
[facility]        NAME: Josephine Mckay is a 18 y.o. female  : 2005    MRN: [id number]  DATE: 2024  TIME: 3:38 PM    Assessment and Plan   Viral illness [B34.9]  1. Viral illness  Poct Covid 19 Rapid Antigen Test      2. Nausea  POCT urine HCG            Patient Instructions     Tylenol or Ibuprofen as needed for fever or pain  Drink plenty of fluids  Over the Counter cold medication to control symptoms  If symptoms fail to improve follow up with PCP  If symptoms worsen have yourself rechecked  Follow up with GYN regarding abnormal menses and PCOS    Follow up with PCP in 3-5 days.  Proceed to  ER if symptoms worsen.    If tests have been performed at [facility], our office will contact you with results if changes need to be made to the care plan discussed with you at the visit.  [facility]    Chief Complaint     Chief Complaint   Patient presents with   • Cold Like Symptoms     Headache, sinus congestion for 1 week.  nausea, vomiting for 2 weeks, period is late         History of Present Illness       Patient present swith a 2 week history of nausea and vomiting. Menses is late.  According to patient's chart she has a history of PCOS but emphatically states she was never told about the diagnosis.  At her last gynecology visit there is documentation this was discussed.  Also having runny, stuffy nose, sinus pressure, sore throat, body aches and headaches.  No known exposure to COVID.  Point-of-care ECG negative.  Point-of-care COVID testing negative.        Review of Systems   Review of Systems   Constitutional:  Negative for chills and fever.   HENT:  Positive for congestion, rhinorrhea, sinus pressure and sore throat.    Respiratory:  Negative for cough.    Gastrointestinal:  Positive for nausea and vomiting. Negative for diarrhea.   Musculoskeletal:  Positive for myalgias.   Skin:  Negative for rash.   Neurological:  Positive for headaches.          Current Medications       Current Outpatient Medications:   •  ALPRAZolam (XANAX) 0.25 mg tablet, Take 0.25 mg by mouth Three times daily as needed, Disp: , Rfl:   •  desvenlafaxine (PRISTIQ) 100 mg 24 hr tablet, Take 100 mg by mouth every morning, Disp: , Rfl:   •  methocarbamol (ROBAXIN) 500 mg tablet, take 1 tablet by mouth three times a day if needed for muscle spasm, Disp: 30 tablet, Rfl: 0  •  mirtazapine (REMERON) 30 mg tablet, Take 1 tablet (30 mg total) by mouth daily at bedtime, Disp: 30 tablet, Rfl: 0  •  OLANZapine (ZyPREXA) 10 mg tablet, Take 10 mg by mouth daily at bedtime, Disp: , Rfl:   •  prazosin (MINIPRESS) 5 mg capsule, Take 5 mg by mouth daily at bedtime, Disp: , Rfl:   •  albuterol (2.5 mg/3 mL) 0.083 % nebulizer solution, Take 3 mL (2.5 mg total) by nebulization every 6 (six) hours as needed for wheezing or shortness of breath (Patient not taking: Reported on 8/26/2024), Disp: 180 mL, Rfl: 0  •  hydrOXYzine HCL (ATARAX) 25 mg tablet, Take 1 tablet (25 mg total) by mouth every 6 (six) hours as needed for itching (Patient not taking: Reported on 8/26/2024), Disp: 30 tablet, Rfl: 0  •  OLANZapine (ZyPREXA) 5 mg tablet, Take 1 tablet (5 mg total) by mouth daily at bedtime (Patient not taking: Reported on 8/26/2024), Disp: 30 tablet, Rfl: 0    Current Allergies     Allergies as of 08/26/2024 - Reviewed 08/26/2024   Allergen Reaction Noted   • Latex Swelling 11/20/2020   • Shrimp (diagnostic) - food allergy Swelling 05/06/2022   • Adhesive [medical tape] Itching 07/02/2020   • Other Swelling and Rash 07/25/2022            The following portions of the patient's history were reviewed and updated as appropriate: allergies, current medications, past family history, past medical history, past social history, past surgical history and problem list.     Past Medical History:   Diagnosis Date   • ADHD (attention deficit hyperactivity disorder)    • Anxiety    • Asthma    • Bipolar disorder (HCC)   "  • Depression    • Environmental allergies    • PONV (postoperative nausea and vomiting)    • PTSD (post-traumatic stress disorder)        Past Surgical History:   Procedure Laterality Date   • IN ARTHRD MIDTARSL/TARSOMETATARSAL MULT/TRANSVRS Left 10/13/2022    Procedure: ARTHRODESIS / FUSION FOOT;  Surgeon: Houston Mercado DPM;  Location: [facility];  Service: Podiatry   • IN OPEN TREATMENT TARSOMETATARSAL JOINT DISLOCATION Left 5/6/2022    Procedure: ORIF OF LEFT FOOT UTILIZING PLATES AND SCREW AS NECESSARY;  Surgeon: Houston Mercado DPM;  Location: [facility];  Service: Podiatry   • IN REMOVAL IMPLANT DEEP Left 7/28/2022    Procedure: REMOVAL HARDWARE FOOT;  Surgeon: Houston Mercado DPM;  Location: [facility];  Service: Podiatry       Family History   Problem Relation Age of Onset   • Thyroid cancer Sister    • Autism Brother    • Diabetes type II Maternal Grandmother          Medications have been verified.        Objective   /68   Pulse 96   Temp 97.6 °F (36.4 °C) (Temporal)   Resp 18   Ht 5' 2\" (1.575 m)   Wt (!) 147 kg (325 lb)   SpO2 97%   BMI 59.44 kg/m²   No LMP recorded.       Physical Exam     Physical Exam  Vitals and nursing note reviewed.   Constitutional:       Appearance: Normal appearance.   HENT:      Head: Normocephalic and atraumatic.      Right Ear: Tympanic membrane normal.      Left Ear: Tympanic membrane normal.      Mouth/Throat:      Mouth: Mucous membranes are moist.      Pharynx: Oropharynx is clear.   Eyes:      Conjunctiva/sclera: Conjunctivae normal.   Cardiovascular:      Rate and Rhythm: Normal rate and regular rhythm.      Heart sounds: Normal heart sounds.   Pulmonary:      Effort: Pulmonary effort is normal.      Breath sounds: Normal breath sounds.   Musculoskeletal:      Cervical back: Neck supple.   Lymphadenopathy:      Cervical: No cervical adenopathy.   Skin:     General: Skin is warm.   Neurological:      Mental Status: She is " alert.

## 2024-08-26 NOTE — PATIENT INSTRUCTIONS
Tylenol or Ibuprofen as needed for fever or pain  Drink plenty of fluids  Over the Counter cold medication to control symptoms  If symptoms fail to improve follow up with PCP  If symptoms worsen have yourself rechecked  Follow up with GYN regarding abnormal menses and PCOS

## 2024-08-29 ENCOUNTER — TELEPHONE (OUTPATIENT)
Dept: FAMILY MEDICINE CLINIC | Facility: CLINIC | Age: 19
End: 2024-08-29

## 2024-08-29 NOTE — TELEPHONE ENCOUNTER
----- Message from Larisa Peralta DO sent at 8/26/2024  7:48 PM EDT -----  Pt due after 9/18 for annual visit - she cx appt in August with Nallely banegas with new provider Please reach out to get her scheduled for annual end of September

## 2024-09-03 ENCOUNTER — OFFICE VISIT (OUTPATIENT)
Dept: URGENT CARE | Facility: MEDICAL CENTER | Age: 19
End: 2024-09-03
Payer: COMMERCIAL

## 2024-09-03 VITALS
HEART RATE: 91 BPM | SYSTOLIC BLOOD PRESSURE: 115 MMHG | OXYGEN SATURATION: 98 % | DIASTOLIC BLOOD PRESSURE: 62 MMHG | RESPIRATION RATE: 20 BRPM | TEMPERATURE: 97.5 F

## 2024-09-03 DIAGNOSIS — H60.502 ACUTE OTITIS EXTERNA OF LEFT EAR, UNSPECIFIED TYPE: Primary | ICD-10-CM

## 2024-09-03 PROCEDURE — 99212 OFFICE O/P EST SF 10 MIN: CPT | Performed by: PHYSICIAN ASSISTANT

## 2024-09-03 RX ORDER — OFLOXACIN 3 MG/ML
5 SOLUTION AURICULAR (OTIC) 2 TIMES DAILY
Qty: 3.5 ML | Refills: 0 | Status: SHIPPED | OUTPATIENT
Start: 2024-09-03 | End: 2024-09-04 | Stop reason: ALTCHOICE

## 2024-09-03 NOTE — PATIENT INSTRUCTIONS
Start Floxin ear drops  Keep ear dry  Tyelnol Ibuprofen as needed for pain  If symptoms fail to improve follow up with ENT

## 2024-09-03 NOTE — PROGRESS NOTES
[facility]        NAME: Josephine Mckay is a 18 y.o. female  : 2005    MRN: [id number]  DATE: September 3, 2024  TIME: 6:22 PM    Assessment and Plan   Acute otitis externa of left ear, unspecified type [H60.502]  1. Acute otitis externa of left ear, unspecified type  ofloxacin (FLOXIN) 0.3 % otic solution            Patient Instructions     Start Floxin ear drops  Keep ear dry  Tyelnol Ibuprofen as needed for pain  If symptoms fail to improve follow up with ENT  Follow up with PCP in 3-5 days.  Proceed to  ER if symptoms worsen.    If tests have been performed at [facility], our office will contact you with results if changes need to be made to the care plan discussed with you at the visit.  [facility]    Chief Complaint     Chief Complaint   Patient presents with   • Earache     Left ear swelling and pain is traveling down into jaw making difficult to chew or open mouth. Thinks the ear is completely swollen shut. Started 3 days ago. Did apply ear drops left over from when mom had an ear infection. Taking mucinex,  ibuprofen for the pain. Helped yesterday but not today.          History of Present Illness       Patient presents with a 3-day history of left ear pain, muffled hearing and swelling.  Pain radiates into the left side of her jaw.  States she had a yellow drainage from her ear canal today.  She had leftover eardrops from mother's infection which she believes is Cortisporin which did not improve her symptoms.  Taking ibuprofen for pain which is no longer helping.        Review of Systems   Review of Systems   Constitutional:  Negative for chills and fever.   HENT:  Positive for ear discharge and ear pain. Negative for dental problem.    Hematological:  Negative for adenopathy.         Current Medications       Current Outpatient Medications:   •  ALPRAZolam (XANAX) 0.25 mg tablet, Take 0.25 mg by mouth Three times daily as needed, Disp: , Rfl:    •  desvenlafaxine (PRISTIQ) 100 mg 24 hr tablet, Take 100 mg by mouth every morning, Disp: , Rfl:   •  methocarbamol (ROBAXIN) 500 mg tablet, take 1 tablet by mouth three times a day if needed for muscle spasm, Disp: 30 tablet, Rfl: 0  •  mirtazapine (REMERON) 30 mg tablet, Take 1 tablet (30 mg total) by mouth daily at bedtime, Disp: 30 tablet, Rfl: 0  •  ofloxacin (FLOXIN) 0.3 % otic solution, Administer 5 drops into the left ear 2 (two) times a day for 7 days, Disp: 3.5 mL, Rfl: 0  •  OLANZapine (ZyPREXA) 10 mg tablet, Take 10 mg by mouth daily at bedtime, Disp: , Rfl:   •  prazosin (MINIPRESS) 5 mg capsule, Take 5 mg by mouth daily at bedtime, Disp: , Rfl:   •  albuterol (2.5 mg/3 mL) 0.083 % nebulizer solution, Take 3 mL (2.5 mg total) by nebulization every 6 (six) hours as needed for wheezing or shortness of breath (Patient not taking: Reported on 8/26/2024), Disp: 180 mL, Rfl: 0  •  hydrOXYzine HCL (ATARAX) 25 mg tablet, Take 1 tablet (25 mg total) by mouth every 6 (six) hours as needed for itching (Patient not taking: Reported on 8/26/2024), Disp: 30 tablet, Rfl: 0  •  OLANZapine (ZyPREXA) 5 mg tablet, Take 1 tablet (5 mg total) by mouth daily at bedtime (Patient not taking: Reported on 8/26/2024), Disp: 30 tablet, Rfl: 0    Current Allergies     Allergies as of 09/03/2024 - Reviewed 09/03/2024   Allergen Reaction Noted   • Latex Swelling 11/20/2020   • Shrimp (diagnostic) - food allergy Swelling 05/06/2022   • Adhesive [medical tape] Itching 07/02/2020   • Other Swelling and Rash 07/25/2022            The following portions of the patient's history were reviewed and updated as appropriate: allergies, current medications, past family history, past medical history, past social history, past surgical history and problem list.     Past Medical History:   Diagnosis Date   • ADHD (attention deficit hyperactivity disorder)    • Anxiety    • Asthma    • Bipolar disorder (HCC)    • Depression    • Environmental  allergies    • PONV (postoperative nausea and vomiting)    • PTSD (post-traumatic stress disorder)        Past Surgical History:   Procedure Laterality Date   • [place] ARTHRD MIDTARSL/TARSOMETATARSAL MULT/TRANSVRS Left 10/13/2022    Procedure: ARTHRODESIS / FUSION FOOT;  Surgeon: Houston Mercado DPM;  Location: [facility];  Service: Podiatry   • [place] OPEN TREATMENT TARSOMETATARSAL JOINT DISLOCATION Left 5/6/2022    Procedure: ORIF OF LEFT FOOT UTILIZING PLATES AND SCREW AS NECESSARY;  Surgeon: Houston Meracdo DPM;  Location: [facility];  Service: Podiatry   • [place] REMOVAL IMPLANT DEEP Left 7/28/2022    Procedure: REMOVAL HARDWARE FOOT;  Surgeon: Houston Mercado DPM;  Location: [facility];  Service: Podiatry       Family History   Problem Relation Age of Onset   • Thyroid cancer Sister    • Autism Brother    • Diabetes type II Maternal Grandmother          Medications have been verified.        Objective   /62   Pulse 91   Temp 97.5 °F (36.4 °C)   Resp 20   SpO2 98%   No LMP recorded.       Physical Exam     Physical Exam  Vitals and nursing note reviewed.   Constitutional:       Appearance: Normal appearance.   HENT:      Head: Normocephalic and atraumatic.      Right Ear: Tympanic membrane and ear canal normal.      Ears:      Comments: Left ear canal with swelling erythema and debris.  TM partially obscured.  Pain with palpation of the external ear.     Mouth/Throat:      Mouth: Mucous membranes are moist.   Cardiovascular:      Rate and Rhythm: Normal rate.   Pulmonary:      Effort: Pulmonary effort is normal.   Skin:     General: Skin is warm.   Neurological:      Mental Status: She is alert.

## 2024-09-04 ENCOUNTER — HOSPITAL ENCOUNTER (EMERGENCY)
Facility: HOSPITAL | Age: 19
Discharge: HOME | End: 2024-09-04
Attending: EMERGENCY MEDICINE
Payer: COMMERCIAL

## 2024-09-04 ENCOUNTER — NURSE TRIAGE (OUTPATIENT)
Age: 19
End: 2024-09-04

## 2024-09-04 VITALS
RESPIRATION RATE: 14 BRPM | HEART RATE: 87 BPM | TEMPERATURE: 97.5 F | OXYGEN SATURATION: 98 % | DIASTOLIC BLOOD PRESSURE: 85 MMHG | SYSTOLIC BLOOD PRESSURE: 132 MMHG

## 2024-09-04 DIAGNOSIS — H60.92 LEFT OTITIS EXTERNA: Primary | ICD-10-CM

## 2024-09-04 LAB
EXT PREGNANCY TEST URINE: NEGATIVE
EXT. CONTROL: NORMAL

## 2024-09-04 PROCEDURE — 99283 EMERGENCY DEPT VISIT LOW MDM: CPT

## 2024-09-04 PROCEDURE — 96372 THER/PROPH/DIAG INJ SC/IM: CPT

## 2024-09-04 PROCEDURE — 81025 URINE PREGNANCY TEST: CPT | Performed by: PHYSICIAN ASSISTANT

## 2024-09-04 PROCEDURE — 99284 EMERGENCY DEPT VISIT MOD MDM: CPT | Performed by: PHYSICIAN ASSISTANT

## 2024-09-04 RX ORDER — CIPROFLOXACIN AND DEXAMETHASONE 3; 1 MG/ML; MG/ML
4 SUSPENSION/ DROPS AURICULAR (OTIC) 2 TIMES DAILY
Qty: 7.5 ML | Refills: 0 | Status: SHIPPED | OUTPATIENT
Start: 2024-09-04 | End: 2024-09-11

## 2024-09-04 RX ORDER — CIPROFLOXACIN AND DEXAMETHASONE 3; 1 MG/ML; MG/ML
4 SUSPENSION/ DROPS AURICULAR (OTIC) ONCE
Status: COMPLETED | OUTPATIENT
Start: 2024-09-04 | End: 2024-09-04

## 2024-09-04 RX ORDER — KETOROLAC TROMETHAMINE 30 MG/ML
30 INJECTION, SOLUTION INTRAMUSCULAR; INTRAVENOUS ONCE
Status: COMPLETED | OUTPATIENT
Start: 2024-09-04 | End: 2024-09-04

## 2024-09-04 RX ADMIN — CIPROFLOXACIN AND DEXAMETHASONE 4 DROP: 3; 1 SUSPENSION/ DROPS AURICULAR (OTIC) at 12:00

## 2024-09-04 RX ADMIN — KETOROLAC TROMETHAMINE 30 MG: 30 INJECTION, SOLUTION INTRAMUSCULAR at 12:02

## 2024-09-04 NOTE — ED PROVIDER NOTES
History  Chief Complaint   Patient presents with    Earache     Pt states that she was seen at urgent care yesterday and treated for swimmer ear in her left ear. Pt woke up with extreme pain and drainage      18 year old female with PMH seasonal allergies, asthma, ADHD, anxiety, bipolar presenting for evaluation of left ear pain.  Pt reports pain in left ear over the past 3-4 days.  Reports pain in left ear.  Radiates into jaw.  Notes some drainage from the ear.  She notes some runny nose and congestion which she contributes to her usual seasonal allergies.  Denies fever, chills.  Denies cough.  Denies sore throat.  Denies chest pain, SOB, N/V/D, abdominal pain.  No reported alleviating factors.  Pt reports she was seen at urgent care yesterday, started on drops.  She notes today the ear was burning and she felt the pain was worse.  She states she called PCP and was instructed to come to ED for further evaluation.  No reported injury/trauma to the ear.  No reported history of recurrent ear issues.  Tried OTC meds without relief.  Denies recent travel.  Denies sick contacts.      History provided by:  Patient and medical records   used: No    Earache  Location:  Left  Chronicity:  New  Context: not direct blow, not foreign body in ear and not recent URI    Relieved by:  Nothing  Worsened by:  Nothing  Associated symptoms: ear discharge    Associated symptoms: no abdominal pain, no congestion, no cough, no diarrhea, no fever, no headaches, no neck pain, no rash, no rhinorrhea, no sore throat and no vomiting    Risk factors: no recent travel, no chronic ear infection and no prior ear surgery        Prior to Admission Medications   Prescriptions Last Dose Informant Patient Reported? Taking?   ALPRAZolam (XANAX) 0.25 mg tablet   Yes No   Sig: Take 0.25 mg by mouth Three times daily as needed   OLANZapine (ZyPREXA) 10 mg tablet   Yes No   Sig: Take 10 mg by mouth daily at bedtime   OLANZapine (ZyPREXA)  5 mg tablet  Self No No   Sig: Take 1 tablet (5 mg total) by mouth daily at bedtime   Patient not taking: Reported on 8/26/2024   albuterol (2.5 mg/3 mL) 0.083 % nebulizer solution   No No   Sig: Take 3 mL (2.5 mg total) by nebulization every 6 (six) hours as needed for wheezing or shortness of breath   Patient not taking: Reported on 8/26/2024   desvenlafaxine (PRISTIQ) 100 mg 24 hr tablet   Yes No   Sig: Take 100 mg by mouth every morning   hydrOXYzine HCL (ATARAX) 25 mg tablet   No No   Sig: Take 1 tablet (25 mg total) by mouth every 6 (six) hours as needed for itching   Patient not taking: Reported on 8/26/2024   methocarbamol (ROBAXIN) 500 mg tablet   No No   Sig: take 1 tablet by mouth three times a day if needed for muscle spasm   mirtazapine (REMERON) 30 mg tablet   No No   Sig: Take 1 tablet (30 mg total) by mouth daily at bedtime   prazosin (MINIPRESS) 5 mg capsule   Yes No   Sig: Take 5 mg by mouth daily at bedtime      Facility-Administered Medications: None       Past Medical History:   Diagnosis Date    ADHD (attention deficit hyperactivity disorder)     Anxiety     Asthma     Bipolar disorder (HCC)     Depression     Environmental allergies     PONV (postoperative nausea and vomiting)     PTSD (post-traumatic stress disorder)        Past Surgical History:   Procedure Laterality Date    [place] ARTHRD MIDTARSL/TARSOMETATARSAL MULT/TRANSVRS Left 10/13/2022    Procedure: ARTHRODESIS / FUSION FOOT;  Surgeon: Houston Mercado DPM;  Location: [facility];  Service: Podiatry    [place] OPEN TREATMENT TARSOMETATARSAL JOINT DISLOCATION Left 5/6/2022    Procedure: ORIF OF LEFT FOOT UTILIZING PLATES AND SCREW AS NECESSARY;  Surgeon: Houston Mercado DPM;  [facility]: [facility];  Service: Podiatry    [place] REMOVAL IMPLANT DEEP Left 7/28/2022    Procedure: REMOVAL HARDWARE FOOT;  Surgeon: Houston Mercado DPM;  Location: [facility];  Service: Podiatry       Family History   Problem Relation Age of  Onset    Thyroid cancer Sister     Autism Brother     Diabetes type II Maternal Grandmother      I have reviewed and agree with the history as documented.    E-Cigarette/Vaping    E-Cigarette Use Current Some Day User      E-Cigarette/Vaping Substances    Nicotine Yes     THC No     CBD No     Flavoring Yes     Other No     Unknown No      Social History     Tobacco Use    Smoking status: Every Day     Current packs/day: 1.00     Types: Cigarettes    Smokeless tobacco: Never    Tobacco comments:     cutting back   Vaping Use    Vaping status: Some Days    Substances: Nicotine, Flavoring   Substance Use Topics    Alcohol use: Yes     Comment: Occasional    Drug use: Yes     Types: Marijuana     Comment: patient states 1 time per week       Review of Systems   Constitutional: Negative.  Negative for chills, fatigue and fever.   HENT:  Positive for ear discharge and ear pain. Negative for congestion, dental problem, rhinorrhea, sore throat and trouble swallowing.    Eyes: Negative.    Respiratory: Negative.  Negative for cough, shortness of breath and wheezing.    Cardiovascular: Negative.  Negative for chest pain.   Gastrointestinal: Negative.  Negative for abdominal pain, diarrhea, nausea and vomiting.   Genitourinary: Negative.  Negative for dysuria, flank pain, frequency and hematuria.   Musculoskeletal: Negative.  Negative for back pain, myalgias and neck pain.   Skin: Negative.  Negative for rash.   Allergic/Immunologic: Positive for environmental allergies.   Neurological: Negative.  Negative for dizziness, light-headedness and headaches.   Psychiatric/Behavioral: Negative.     All other systems reviewed and are negative.      Physical Exam  Physical Exam  Vitals and nursing note reviewed.   Constitutional:       General: She is awake. She is not in acute distress.     Appearance: She is well-developed. She is obese. She is not toxic-appearing.   HENT:      Head: Normocephalic and atraumatic.      Jaw: There is  normal jaw occlusion. No trismus or swelling.      Right Ear: Hearing, tympanic membrane, ear canal and external ear normal.      Left Ear: Hearing, tympanic membrane and external ear normal. Drainage, swelling and tenderness present.      Ears:      Comments: There is mild swelling in the canal but patent.  There is whitish discharge present.  TM otherwise appears WNL and intact.  No swelling, redness or tenderness overlying mastoid.     Nose: Nose normal.      Mouth/Throat:      Lips: Pink.      Mouth: Oropharynx is clear and moist and mucous membranes are normal. Mucous membranes are moist. No oral lesions.      Dentition: No dental abscesses.      Pharynx: Oropharynx is clear. Uvula midline.   Eyes:      General: Lids are normal. No scleral icterus.     Extraocular Movements: EOM normal.      Conjunctiva/sclera: Conjunctivae normal.      Pupils: Pupils are equal, round, and reactive to light.   Neck:      Trachea: Trachea and phonation normal. No tracheal deviation.   Cardiovascular:      Rate and Rhythm: Normal rate and regular rhythm.      Pulses: Normal pulses.           Radial pulses are 2+ on the right side and 2+ on the left side.      Heart sounds: Normal heart sounds, S1 normal and S2 normal.   Pulmonary:      Effort: Pulmonary effort is normal. No tachypnea or respiratory distress.      Breath sounds: Normal breath sounds. No wheezing, rhonchi or rales.   Abdominal:      Tenderness: There is no CVA tenderness.   Musculoskeletal:         General: No edema.      Cervical back: Normal range of motion and neck supple.   Skin:     General: Skin is warm and dry.      Capillary Refill: Capillary refill takes less than 2 seconds.      Findings: No rash.   Neurological:      General: No focal deficit present.      Mental Status: She is alert and oriented to person, place, and time.      GCS: GCS eye subscore is 4. GCS verbal subscore is 5. GCS motor subscore is 6.      Gait: Gait normal.   Psychiatric:          "Mood and Affect: Mood is anxious.         Speech: Speech normal.         Behavior: Behavior normal. Behavior is cooperative.         Vital Signs  ED Triage Vitals [09/04/24 1146]   Temperature Pulse Respirations Blood Pressure SpO2   97.5 °F (36.4 °C) 87 14 132/85 98 %      Temp Source Heart Rate Source Patient Position - Orthostatic VS BP Location FiO2 (%)   Tympanic Monitor Sitting Right arm --      Pain Score       10 - Worst Possible Pain           Vitals:    09/04/24 1146   BP: 132/85   Pulse: 87   Patient Position - Orthostatic VS: Sitting         Visual Acuity      ED Medications  Medications   ketorolac (TORADOL) injection 30 mg (30 mg Intramuscular Given 9/4/24 1202)   ciprofloxacin-dexamethasone (CIPRODEX) 0.3-0.1 % otic suspension 4 drop (4 drops Left Ear Given 9/4/24 1200)       Diagnostic Studies  Results Reviewed       Procedure Component Value Units Date/Time    POCT pregnancy, urine [[id number]]  (Normal) Resulted: 09/04/24 1201    Lab Status: Final result Updated: 09/04/24 1202     EXT Preg Test, Ur Negative     Control Valid                   No orders to display              Procedures  Procedures         ED Course  ED Course as of 09/04/24 1255   Wed Sep 04, 2024   1139 Prior records reviewed.  Was seen at urgent care yesterday and given ofloxacin drops.         CRAFFT      Flowsheet Row Most Recent Value   LUNA Initial Screen: During the past 12 months, did you:    1. Drink any alcohol (more than a few sips)?  No Filed at: 09/04/2024 1145   2. Smoke any marijuana or hashish No Filed at: 09/04/2024 1145   3. Use anything else to get high? (\"anything else\" includes illegal drugs, over the counter and prescription drugs, and things that you sniff or 'dale')? No Filed at: 09/04/2024 1145                                              Medical Decision Making  19 yo female presenting for evaluation of continued left ear pain.  Prior records reviewed.  Exam c/w OE.  No noted OM.  Pt afebrile, " hemodynamically stable.  Reviewed usual course and treatment.  Will switch drop to otic Abx containing steroid as well to help with swelling/pain.  Will provide dose of toradol IM to help with pain relief.    Advised to keep water out of the ear.  Discussed continued symptomatic/supportive care.  Abx ear drop as directed.  Continue OTC tylenol and ibuprofen as needed for fever/discomfort.  Strict return precautions outlined.  Advised may need to see ENT if symptoms not improving.  Advised outpatient follow up with PCP or return to ER for change in condition as outlined. Pt verbalized understanding and had no further questions.     Please refer to above ER course for further details/discussion.      Problems Addressed:  Left otitis externa: acute illness or injury    Amount and/or Complexity of Data Reviewed  External Data Reviewed: notes.  Labs: ordered. Decision-making details documented in ED Course.    Risk  OTC drugs.  Prescription drug management.                 Disposition  Final diagnoses:   Left otitis externa     Time reflects when diagnosis was documented in both MDM as applicable and the Disposition within this note       Time User Action Codes Description Comment    9/4/2024 11:58 AM Alina Viveros Add [H60.92] Left otitis externa           ED Disposition       ED Disposition   Discharge    Condition   Stable    Date/Time   Wed Sep 4, 2024 1157    Comment   Josephine Mckay discharge to home/self care.                   Follow-up Information       Follow up With Specialties Details Why Contact Info Additional Information    [facility] Otolaryngology Schedule an appointment as soon as possible for a visit   [address] [place] [address] [phone] [facility], [address] [place] [address], [address] [phone]    [facility] Emergency Medicine  As needed [address] [facility] [address] [phone] [facility]  [facility], [address], [place], [address]            Discharge Medication List as of 9/4/2024 12:00 PM        START taking these medications    Details   ciprofloxacin-dexamethasone (CIPRODEX) otic suspension Administer 4 drops into the left ear 2 (two) times a day for 7 days, Starting Wed 9/4/2024, Until Wed 9/11/2024, Normal           CONTINUE these medications which have NOT CHANGED    Details   albuterol (2.5 mg/3 mL) 0.083 % nebulizer solution Take 3 mL (2.5 mg total) by nebulization every 6 (six) hours as needed for wheezing or shortness of breath, Starting Mon 1/29/2024, Normal      ALPRAZolam (XANAX) 0.25 mg tablet Take 0.25 mg by mouth Three times daily as needed, Historical Med      desvenlafaxine (PRISTIQ) 100 mg 24 hr tablet Take 100 mg by mouth every morning, Starting Tue 4/2/2024, Historical Med      hydrOXYzine HCL (ATARAX) 25 mg tablet Take 1 tablet (25 mg total) by mouth every 6 (six) hours as needed for itching, Starting Mon 7/22/2024, Normal      methocarbamol (ROBAXIN) 500 mg tablet take 1 tablet by mouth three times a day if needed for muscle spasm, Starting Wed 7/10/2024, Normal      mirtazapine (REMERON) 30 mg tablet Take 1 tablet (30 mg total) by mouth daily at bedtime, Starting Tue 2/20/2024, Normal      !! OLANZapine (ZyPREXA) 10 mg tablet Take 10 mg by mouth daily at bedtime, Starting Tue 6/4/2024, Historical Med      !! OLANZapine (ZyPREXA) 5 mg tablet Take 1 tablet (5 mg total) by mouth daily at bedtime, Starting Mon 3/25/2024, Normal      prazosin (MINIPRESS) 5 mg capsule Take 5 mg by mouth daily at bedtime, Historical Med       !! - Potential duplicate medications found. Please discuss with provider.          No discharge procedures on file.    PDMP Review         Value Time User    PDMP Reviewed  Yes 6/6/2024  8:20 AM Shahzad Jade DO            ED Provider  Electronically Signed by             Alina Viveros PA-C  09/04/24  [id number]

## 2024-09-04 NOTE — TELEPHONE ENCOUNTER
Regarding: ear pain  ----- Message from Silva GEORGE sent at 9/4/2024  7:45 AM EDT -----  Pt was seen yesterday urgent care for ear pain was prescribe ear drops pt put in her ear went to sleep now has a burning pain in her ear unable to speak with nurse I lost pt during the transfer can call pt [phone]

## 2024-09-04 NOTE — DISCHARGE INSTRUCTIONS
Start the ciprodex drops in place of the ofloxacin drops.  Continue to alternate OTC tylenol and ibuprofen for pain relief.  Follow up with PCP.  Consider ENT evaluation if not improving.  Return to ER as needed.

## 2024-09-04 NOTE — TELEPHONE ENCOUNTER
Pt returned call and is audibly crying and difficult to understand.  Unable to ask pt triage questions d/t her being upset.  Pt states she took Ibuprofen for her pain but doesn't remember how long ago but states it is ineffective.  Pt states medication that was prescribed by UC is causing her ear to burn and more pain.  Pt advised to go to ED for further evaluation.  Pt states she doesn't have a ride.  Offered to call ambulance for pt.  Pt declined.  Pt states she is at her mother's home right now but her mother doesn't have a car.  Advised pt to call [employer] or [employer].  Pt agreeable with plan.    Reason for Disposition   Patient sounds very sick or weak to the triager    Protocols used: Earache-ADULT-OH

## 2024-09-16 ENCOUNTER — TELEPHONE (OUTPATIENT)
Age: 19
End: 2024-09-16
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
## 2024-09-30 ENCOUNTER — OFFICE VISIT (OUTPATIENT)
Dept: PODIATRY | Facility: CLINIC | Age: 19
End: 2024-09-30
Payer: COMMERCIAL

## 2024-09-30 ENCOUNTER — TELEPHONE (OUTPATIENT)
Age: 19
End: 2024-09-30

## 2024-09-30 VITALS
HEART RATE: 92 BPM | WEIGHT: 293 LBS | TEMPERATURE: 98.1 F | BODY MASS INDEX: 53.92 KG/M2 | SYSTOLIC BLOOD PRESSURE: 105 MMHG | OXYGEN SATURATION: 97 % | HEIGHT: 62 IN | DIASTOLIC BLOOD PRESSURE: 62 MMHG

## 2024-09-30 DIAGNOSIS — M79.672 PAIN IN LEFT FOOT: Primary | ICD-10-CM

## 2024-09-30 DIAGNOSIS — F17.210 CIGARETTE NICOTINE DEPENDENCE WITHOUT COMPLICATION: ICD-10-CM

## 2024-09-30 PROCEDURE — 99213 OFFICE O/P EST LOW 20 MIN: CPT | Performed by: PODIATRIST

## 2024-09-30 NOTE — PROGRESS NOTES
Ambulatory Visit  Name: Josephine Mckay      : 2005      MRN: [id number]  Encounter Provider: Houston Mercado DPM  Encounter Date: 2024   Encounter department: [facility] PODIATRY [place]    Assessment & Plan  Pain in left foot         Cigarette nicotine dependence without complication         -Advised on nicotine cessation we did discuss testing prior to surgery  - I do not think that she necessarily needs another CT but we would get probably get a surveillance x-ray prior to surgical planning  - When she is ready she is to reach out and we will send her out for a nicotine test and if she is negative we will schedule her this has been a constant issue for some time and hopefully we can pursue revisional surgery after she does quit nicotine, she is also going to start weight loss and weight management    History of Present Illness     Josephine Mckay is a 18 y.o. female who presents evaluation management of her left foot having continued severe pain messing with her activities of daily living.  She would like to have this fixed but is still somewhat nicotine dependent she is down from a pack a day and smoking 5 to 6 cigarettes a day.  States that she is going to also start weight management      Review of Systems   Constitutional:  Negative for chills and fever.   HENT:  Negative for ear pain and sore throat.    Eyes:  Negative for pain and visual disturbance.   Respiratory:  Negative for cough and shortness of breath.    Cardiovascular:  Negative for chest pain and palpitations.   Gastrointestinal:  Negative for abdominal pain and vomiting.   Genitourinary:  Negative for dysuria and hematuria.   Musculoskeletal:  Negative for arthralgias and back pain.   Skin:  Negative for color change and rash.   Neurological:  Negative for seizures and syncope.   All other systems reviewed and are negative.          Objective     /62 (BP Location: Left arm, Patient Position: Sitting, Cuff Size:  "Large)   Pulse 92   Temp 98.1 °F (36.7 °C) (Temporal)   Ht 5' 2\" (1.575 m)   Wt (!) 149 kg (329 lb)   LMP  (LMP Unknown)   SpO2 97%   BMI 60.17 kg/m²     Physical Exam  Skin:     Comments: Continued pain with range of motion of the midfoot, swelling warmth scars are healed no other issues         "

## 2024-09-30 NOTE — TELEPHONE ENCOUNTER
Josephine requested her immunization records get faxed to [facility] [phone].  Please contact her after completed

## 2024-10-01 ENCOUNTER — TELEPHONE (OUTPATIENT)
Age: 19
End: 2024-10-01

## 2024-10-01 NOTE — TELEPHONE ENCOUNTER
Patient calling in and asking to reschedule her appt with Roberto Henao for 10/2    Tried calling office and line was busy     Please call patient back and reschedule appt

## 2024-10-22 ENCOUNTER — TELEPHONE (OUTPATIENT)
Dept: BARIATRICS | Facility: CLINIC | Age: 19
End: 2024-10-22

## 2024-10-22 NOTE — TELEPHONE ENCOUNTER
Pt is calling to reschedule appt for Dr. Roberto Henao for surgery consult. Please contact pt for scheduling

## 2024-10-30 ENCOUNTER — CONSULT (OUTPATIENT)
Dept: PAIN MEDICINE | Facility: CLINIC | Age: 19
End: 2024-10-30
Payer: COMMERCIAL

## 2024-10-30 VITALS
RESPIRATION RATE: 16 BRPM | HEART RATE: 94 BPM | WEIGHT: 293 LBS | BODY MASS INDEX: 53.92 KG/M2 | HEIGHT: 62 IN | OXYGEN SATURATION: 98 % | SYSTOLIC BLOOD PRESSURE: 123 MMHG | DIASTOLIC BLOOD PRESSURE: 77 MMHG | TEMPERATURE: 97.9 F

## 2024-10-30 DIAGNOSIS — G89.4 CHRONIC PAIN SYNDROME: ICD-10-CM

## 2024-10-30 DIAGNOSIS — G89.29 CHRONIC BILATERAL LOW BACK PAIN WITHOUT SCIATICA: ICD-10-CM

## 2024-10-30 DIAGNOSIS — M46.1 SACROILIITIS (HCC): ICD-10-CM

## 2024-10-30 DIAGNOSIS — M79.18 MYOFASCIAL PAIN SYNDROME: ICD-10-CM

## 2024-10-30 DIAGNOSIS — G57.03 PIRIFORMIS SYNDROME OF BOTH SIDES: ICD-10-CM

## 2024-10-30 DIAGNOSIS — G89.29 CHRONIC BILATERAL LOW BACK PAIN, UNSPECIFIED WHETHER SCIATICA PRESENT: Primary | ICD-10-CM

## 2024-10-30 DIAGNOSIS — M54.50 CHRONIC BILATERAL LOW BACK PAIN WITHOUT SCIATICA: ICD-10-CM

## 2024-10-30 DIAGNOSIS — M54.16 LUMBAR RADICULOPATHY: ICD-10-CM

## 2024-10-30 DIAGNOSIS — M54.50 CHRONIC BILATERAL LOW BACK PAIN, UNSPECIFIED WHETHER SCIATICA PRESENT: Primary | ICD-10-CM

## 2024-10-30 PROCEDURE — 99204 OFFICE O/P NEW MOD 45 MIN: CPT | Performed by: ANESTHESIOLOGY

## 2024-10-30 NOTE — PATIENT INSTRUCTIONS
Patient Education     Core Strengthening Exercises on Back or on Hands and Knees   About this topic   Your core muscles are in your chest, back, buttock, and stomach area. They are your abdominal, back, and pelvis muscles. These muscles help keep your body stable when using your arms or legs. They also help with balance and posture. There are many exercises you can do to keep these muscles strong.  If you have back problems like a compression fracture or a ruptured disc, doing some of these exercises could make your problem worse. Some of these exercises may cause lower back pain.  General   Before starting with a program, ask your doctor if you are healthy enough to do these exercises. Your doctor may have you work with a , chiropractor, or physical therapist to make a safe exercise program to meet your needs.  Strengthening Exercises   Strengthening exercises keep your muscles firm and strong. Start by repeating each exercise 2 to 3 times. Work up to doing each exercise 10 times. Try to do the exercises 2 to 3 times each day. Hold each exercise for 3 to 5 seconds. Do all exercises slowly.  Hip lifts ? Lie on your back with your knees bent and feet flat on the floor. Tighten your stomach muscles and push your heels into the floor to lift your buttocks off the floor. Relax.  Pelvic tilts ? Lie on your back with your knees bent and feet flat on the floor. Tighten your stomach muscles and press your lower back down to the floor. Relax.  Straight leg raises lying down ? Lie on your back with one leg straight. Bend your other knee so the foot is flat on the bed. Keeping your leg straight, lift the leg up to the level of your other knee. Lower it back down. Repeat with the other leg.  Knee flex lying down ? Lie on your back with both knees bent and your feet flat on the floor. Tighten your belly muscles. Raise one leg up and back down as if you are marching in slow motion. Keep belly muscles tight while you move  your leg. Switch legs. To make this exercise harder, raise both arms straight up in the air. Tighten your belly muscles. When you raise one leg up, reach the opposite arm over your head. Switch, moving the opposite arm and leg until you have done 10 repetitions on each side.  Abdominal crunches ? Lie on your back with both knees bent. Keep your feet flat on the floor. Place your hands in one of these positions. Try starting with the first position since it is the easiest. As you get better, use the other positions to make it harder.  Crunches with arms at sides.  Crunches with arms across chest.  Crunches with arms behind head. Be careful not to interlock your fingers behind your neck or head while doing crunches. This may add tension to your neck and cause strain.  Look at the ceiling. Tighten your belly muscles and lift your shoulders and upper back off the floor. Breathe out while you are doing this. Lower your shoulders to the floor. Breathe in while you are doing this. Relax your belly muscles all the way before starting another crunch.  Arm and leg lifts on hands and knees ? Start on your hands and knees. With all of these exercises, keep your back as level as possible. If you are having trouble with this, you may want to put a small object on your back such as a book. If it falls off, you are not keeping your back level enough during the exercise.  Lift one arm up to shoulder level and hold. Lower it back down. Now, lift up the other arm and hold.  Lift one leg up and kick it straight out until it is in line with your back and hold. Lower it back down. Now, lift up the other leg and hold.  Lift one arm and the OPPOSITE leg up at the same time and hold. Lower them down. Now, repeat using the other arm and leg. This is a very hard exercise. It may take time to be able to do this.               What will the results be?   Stronger core  Better balance  More toned belly and back muscles  Easier to do daily  activities  Better sports performance  Less low back pain  Helpful tips   Stay active and work out to keep your muscles strong and flexible.  Keep a healthy weight to avoid putting too much stress on your spine. Eat a healthy diet to keep your muscles healthy.  Be sure you do not hold your breath when exercising. This can raise your blood pressure. If you tend to hold your breath, try counting out loud when exercising. If any exercise bothers you, stop right away.  Try walking or cycling at an easy pace for a few minutes to warm up your muscles. Do this again after exercising.  Exercise may be slightly uncomfortable, but you should not have sharp pains. If you do get sharp pains, stop what you are doing. If the sharp pains continue, call your doctor.  Last Reviewed Date   2021-03-18  Consumer Information Use and Disclaimer   This generalized information is a limited summary of diagnosis, treatment, and/or medication information. It is not meant to be comprehensive and should be used as a tool to help the user understand and/or assess potential diagnostic and treatment options. It does NOT include all information about conditions, treatments, medications, side effects, or risks that may apply to a specific patient. It is not intended to be medical advice or a substitute for the medical advice, diagnosis, or treatment of a health care provider based on the health care provider's examination and assessment of a patient’s specific and unique circumstances. Patients must speak with a health care provider for complete information about their health, medical questions, and treatment options, including any risks or benefits regarding use of medications. This information does not endorse any treatments or medications as safe, effective, or approved for treating a specific patient. [employer] and its affiliates disclaim any warranty or liability relating to this information or the use thereof. The use of this information  is governed by the Terms of Use, available at https://www.woltersAccuradiouwer.com/en/know/clinical-effectiveness-terms   Copyright   Copyright © 2024 [employer] and its affiliates and/or licensors. All rights reserved.

## 2024-10-30 NOTE — PROGRESS NOTES
Assessment:  1. Chronic bilateral low back pain, unspecified whether sciatica present    2. Chronic bilateral low back pain without sciatica    3. Myofascial pain syndrome    4. Lumbar radiculopathy    5. Piriformis syndrome of both sides    6. Sacroiliitis (HCC)    7. Chronic pain syndrome        Plan:  Patient is a 19-year-old female complains of lowbackpain bilateral leg pain presents to office for initial consultation.  Patient reports a radiating pain that travels down the back of her legs and into her feet which describes a sharp, stabbing, throbbing, cramping pain.  Patient reports restless legs at nighttime.  Patient reports when standing for long peers of time she will have exacerbations of low back pain and bilateral leg pain and weakness.  X-ray of lumbar spine was reviewed and found to be within normal limits.  At this time we will need further diagnostic imaging however we need to exhaust conservative therapy.  Patient reports when sitting for extended period of time she also experienced a sharp, stabbing pain in the low back/butt cheeks and then she will have radiating pain that travels down the legs.  Patient has pain with standing from send positions and from stand position.  Patient reports when rotating left and right in bed she will elicit sharp, stabbing pains in the same area.  There may be a strong component to sacroiliitis.  1.  Provide patient with physical therapy lumbar core strengthening exercises, sacroiliac joint strengthening, piriformis muscle stretching  2.  We will follow-up in 6 weeks    History of Present Illness:    The patient is a 19 y.o. female who presents for consultation in regards to Back Pain.  Symptoms have been present for 6 months. Symptoms began without any precipitating injury or trauma. Pain is reported to be 8 on the numeric rating scale.  Symptoms are felt nearly constantly and worst in the no typical pattern.  Symptoms are characterized as cramping, shooting,  sharp, and throbbing.  Symptoms are associated with bilateral leg weakness.  Aggravating factors include standing, bending, leaning forward, leaning bckward, walking, exercise, and coughing/sneezing.  Relieving factors include sitting, relaxation, and bowel movements.  No change in symptoms with lying down and turning the head.  Patient has not tried any pain relieving modalities at this time.  Medications to relieve symptoms include Robaxin.    Review of Systems:    Review of Systems   Constitutional:  Positive for unexpected weight change.   HENT:  Positive for hearing loss.    Eyes:  Positive for visual disturbance.   Respiratory:  Positive for shortness of breath and wheezing.    Cardiovascular:  Positive for chest pain.   Gastrointestinal:  Positive for vomiting.   Musculoskeletal:  Positive for back pain, gait problem and myalgias.   Neurological:  Positive for dizziness and headaches.   Psychiatric/Behavioral:  The patient is nervous/anxious.         Depression   All other systems reviewed and are negative.          Past Medical History:   Diagnosis Date    ADHD (attention deficit hyperactivity disorder)     Anxiety     Asthma     Bipolar disorder (HCC)     Depression     Environmental allergies     PONV (postoperative nausea and vomiting)     PTSD (post-traumatic stress disorder)        Past Surgical History:   Procedure Laterality Date    AL ARTHRD MIDTARSL/TARSOMETATARSAL MULT/TRANSVRS Left 10/13/2022    Procedure: ARTHRODESIS / FUSION FOOT;  Surgeon: Houston Mercado DPM;  Location: [facility];  Service: Podiatry    AL OPEN TREATMENT TARSOMETATARSAL JOINT DISLOCATION Left 5/6/2022    Procedure: ORIF OF LEFT FOOT UTILIZING PLATES AND SCREW AS NECESSARY;  Surgeon: Houston Mercado DPM;  Location: [facility];  Service: Podiatry    AL REMOVAL IMPLANT DEEP Left 7/28/2022    Procedure: REMOVAL HARDWARE FOOT;  Surgeon: Houston Mercado DPM;  Location: [facility];  Service: Podiatry        Family History   Problem Relation Age of Onset    Thyroid cancer Sister     Autism Brother     Diabetes type II Maternal Grandmother        Social History     Occupational History    Occupation: student   Tobacco Use    Smoking status: Every Day     Current packs/day: 1.00     Types: Cigarettes    Smokeless tobacco: Never    Tobacco comments:     cutting back   Vaping Use    Vaping status: Some Days    Substances: Nicotine, Flavoring   Substance and Sexual Activity    Alcohol use: Yes     Comment: Occasional    Drug use: Yes     Types: Marijuana     Comment: patient states 1 time per week    Sexual activity: Not Currently     Partners: Male         Current Outpatient Medications:     ALPRAZolam (XANAX) 0.25 mg tablet, Take 0.25 mg by mouth Three times daily as needed, Disp: , Rfl:     desvenlafaxine (PRISTIQ) 100 mg 24 hr tablet, Take 100 mg by mouth every morning, Disp: , Rfl:     methocarbamol (ROBAXIN) 500 mg tablet, take 1 tablet by mouth three times a day if needed for muscle spasm, Disp: 30 tablet, Rfl: 0    mirtazapine (REMERON) 30 mg tablet, Take 1 tablet (30 mg total) by mouth daily at bedtime, Disp: 30 tablet, Rfl: 0    OLANZapine (ZyPREXA) 10 mg tablet, Take 10 mg by mouth daily at bedtime, Disp: , Rfl:     prazosin (MINIPRESS) 5 mg capsule, Take 5 mg by mouth daily at bedtime, Disp: , Rfl:     albuterol (2.5 mg/3 mL) 0.083 % nebulizer solution, Take 3 mL (2.5 mg total) by nebulization every 6 (six) hours as needed for wheezing or shortness of breath (Patient not taking: Reported on 8/26/2024), Disp: 180 mL, Rfl: 0    ciprofloxacin-dexamethasone (CIPRODEX) otic suspension, Administer 4 drops into the left ear 2 (two) times a day for 7 days (Patient not taking: Reported on 10/30/2024), Disp: 7.5 mL, Rfl: 0    hydrOXYzine HCL (ATARAX) 25 mg tablet, Take 1 tablet (25 mg total) by mouth every 6 (six) hours as needed for itching (Patient not taking: Reported on 8/26/2024), Disp: 30 tablet, Rfl: 0     "OLANZapine (ZyPREXA) 5 mg tablet, Take 1 tablet (5 mg total) by mouth daily at bedtime (Patient not taking: Reported on 8/26/2024), Disp: 30 tablet, Rfl: 0    Allergies   Allergen Reactions    Latex Swelling    Shrimp (Diagnostic) - Food Allergy Swelling    Adhesive [Medical Tape] Itching     Paper tape-itching    Other Swelling and Rash     Laundry soap unknown name       Physical Exam:    /77   Pulse 94   Temp 97.9 °F (36.6 °C)   Resp 16   Ht 5' 2\" (1.575 m)   Wt (!) 147 kg (324 lb 6.4 oz)   SpO2 98%   BMI 59.33 kg/m²     Constitutional: normal, well developed, well nourished, alert, in no distress and non-toxic and no overt pain behavior. and obese  Eyes: anicteric  HEENT: grossly intact  Neck: supple, symmetric, trachea midline and no masses   Pulmonary:even and unlabored  Cardiovascular:No edema or pitting edema present  Skin:Normal without rashes or lesions and well hydrated  Psychiatric:Mood and affect appropriate  Neurologic:Cranial Nerves II-XII grossly intact  Musculoskeletal:antalgic and ambulation with walker , positive Dorian's, positive gazing, positive compression test, positive tenderness palpation bilateral sacroiliac joints    Lumbar/Sacral Spine examination demonstrates.  Decreased range of motion lumbar spine with pain upon: flexion, lateral rotation to the left/right, and bending to the left/right.  Bilateral lumbar paraspinals tender to palpation. Muscle spasms noted in the lumbar area bilaterally. 4/5 lower extremity strength in all muscle groups bilaterally. Positive seated straight leg raise for bilateral lower extremities.  Sensitivity to light touch intact bilateral lower extremities. 2+ reflexes in the patella and Achilles.  No ankle clonus    Imaging      Study Result    Narrative & Impression         LUMBAR SPINE     INDICATION:   Low back pain, unspecified. Other chronic pain.      COMPARISON:  None.     VIEWS:  XR SPINE LUMBAR MINIMUM 4 VIEWS NON INJURY  Images: 5   "   FINDINGS:     There are 5 non rib bearing lumbar vertebral bodies.      There is no evidence of acute fracture or destructive osseous lesion.     Alignment is unremarkable.      No significant lumbar degenerative change noted.     The pedicles appear intact.     Soft tissues are unremarkable.     IMPRESSION:        Normal examination.         No orders to display       No orders of the defined types were placed in this encounter.

## 2024-11-01 DIAGNOSIS — G89.29 CHRONIC BILATERAL LOW BACK PAIN WITHOUT SCIATICA: ICD-10-CM

## 2024-11-01 DIAGNOSIS — M54.50 CHRONIC BILATERAL LOW BACK PAIN WITHOUT SCIATICA: ICD-10-CM

## 2024-11-01 RX ORDER — METHOCARBAMOL 500 MG/1
500 TABLET, FILM COATED ORAL 3 TIMES DAILY PRN
Qty: 30 TABLET | Refills: 0 | OUTPATIENT
Start: 2024-11-01

## 2024-11-01 NOTE — TELEPHONE ENCOUNTER
Reason for call: Patient state she only have 1 left and state she would need some ASAP    [x] Refill   [] Prior Auth  [] Other:     Office:   [x] PCP/Provider -   [] Specialty/Provider -     Medication: methocarbamol    Dose/Frequency: 500 mg TID PRN     Quantity: 30    Pharmacy: [facility] [place] on file     Does the patient have enough for 3 days?   [] Yes   [x] No - Send as HP to POD

## 2024-11-11 ENCOUNTER — EVALUATION (OUTPATIENT)
Dept: PHYSICAL THERAPY | Facility: HOME HEALTHCARE | Age: 19
End: 2024-11-11
Payer: COMMERCIAL

## 2024-11-11 DIAGNOSIS — M54.50 CHRONIC BILATERAL LOW BACK PAIN WITHOUT SCIATICA: ICD-10-CM

## 2024-11-11 DIAGNOSIS — G89.29 CHRONIC BILATERAL LOW BACK PAIN, UNSPECIFIED WHETHER SCIATICA PRESENT: ICD-10-CM

## 2024-11-11 DIAGNOSIS — G89.29 CHRONIC BILATERAL LOW BACK PAIN WITHOUT SCIATICA: ICD-10-CM

## 2024-11-11 DIAGNOSIS — M79.18 MYOFASCIAL PAIN SYNDROME: ICD-10-CM

## 2024-11-11 DIAGNOSIS — M54.50 CHRONIC BILATERAL LOW BACK PAIN, UNSPECIFIED WHETHER SCIATICA PRESENT: ICD-10-CM

## 2024-11-11 DIAGNOSIS — G89.4 CHRONIC PAIN SYNDROME: ICD-10-CM

## 2024-11-11 DIAGNOSIS — M54.16 LUMBAR RADICULOPATHY: ICD-10-CM

## 2024-11-11 PROCEDURE — 97110 THERAPEUTIC EXERCISES: CPT | Performed by: PHYSICAL THERAPIST

## 2024-11-11 PROCEDURE — 97162 PT EVAL MOD COMPLEX 30 MIN: CPT | Performed by: PHYSICAL THERAPIST

## 2024-11-11 NOTE — PROGRESS NOTES
"PT Evaluation     Today's date: 2024  Patient name: Josephine Mckay  : 2005  MRN: [id number]  Referring provider: Hoa Silverman MD  Dx:   Encounter Diagnosis     ICD-10-CM    1. Chronic bilateral low back pain without sciatica  M54.50 Ambulatory referral to Physical Therapy    G89.29       2. Chronic bilateral low back pain, unspecified whether sciatica present  M54.50 Ambulatory referral to Physical Therapy    G89.29       3. Myofascial pain syndrome  M79.18 Ambulatory referral to Physical Therapy      4. Chronic pain syndrome  G89.4 Ambulatory referral to Physical Therapy      5. Lumbar radiculopathy  M54.16 Ambulatory referral to Physical Therapy                     Assessment  Impairments: abnormal gait, abnormal or restricted ROM, abnormal movement, activity intolerance, impaired balance, impaired physical strength, lacks appropriate home exercise program, pain with function, weight-bearing intolerance, poor posture , poor body mechanics, activity limitations and endurance    Assessment details: Pt Josephine Mckay is a 19 y.o. who presents to OPPT with s/s consistent with chronic LBP due to core strength deficits, poor endurance, poor postural stability, and obesity. Pt presents with limited L/S mobility, decreased B LE ROM and strength, impaired soft tissue mobility/limited flexibility, and gait/balance dysfunctions. Pt with limitations with prolonged ambulation, difficulty with stair negotiation, disrupted sleep patterns, and difficulty with lifting/carrying. Pt would benefit from skilled therapy services to address outlined impairments, work towards goals, and restore pts PLOF. Thank you!     Goals  STGs to be achieved in 4 weeks:  -Pt to demonstrate reduced subjective pain rating \"at worst\" by at least 2-3 points from Initial Eval to allow for reduced pain with ADLs and improved functional activity tolerance.   -Pt to demonstrate L/S ROM improved WFL in order to maximize joint mobility " and function and allow for progression of exercise program and achievement of goals.   -Pt to demonstrate increased MMT of BLE by at least 1/2 grade in order to improve safety and stability with ADLs and functional mobility.     LTGs to be achieved upon discharge:   -Pt will be I with HEP in order to continue to improve quality of life and independence and reduce risk for re-injury.   -Pt to demonstrate return to activities of daily living without limiations or restrictions.   -Pt will return to ambulation > 30 minutes without use of AD to help facilitate return to community activities independently   -Pt to demonstrate return to work activities without limitations or restrictions.   -Pt to demonstrate improved function as noted by achieving or exceeding predicted score on FOTO outcomes assessment tool.          Plan  Patient would benefit from: skilled physical therapy  Planned modality interventions: cryotherapy and thermotherapy: hydrocollator packs    Planned therapy interventions: abdominal trunk stabilization, balance, manual therapy, neuromuscular re-education, patient education, postural training, therapeutic activities, therapeutic exercise, home exercise program, flexibility and functional ROM exercises    Frequency: 2x week  Duration in weeks: 12  Plan of Care beginning date: 11/11/2024  Plan of Care expiration date: 2/3/2025  Treatment plan discussed with: patient and referring physician        Subjective Evaluation    History of Present Illness  Mechanism of injury: Pt reporting that she has been having pain in the lower back since October 2023. She is seeing pain management at this time who wants to order an MRI but insurance denied it until she completes conservative therapy   X 6 weeks. She will be switching to new MD at the end of the month; currently out of medication refills until seeing new physician so she has to self medicate with marijuana.   Quality of life: good    Patient Goals  Patient  goals for therapy: decreased edema, decreased pain, improved balance, increased motion, independence with ADLs/IADLs and increased strength    Pain  At best pain ratin  At worst pain rating: 10  Quality: dull ache, radiating, tight and throbbing  Relieving factors: medications and rest  Aggravating factors: standing, walking, stair climbing, sitting and lifting    Social Support    Employment status: working    Diagnostic Tests  No diagnostic tests performed  Treatments  Current treatment: physical therapy        Objective     Postural Observations  Seated posture: fair  Standing posture: fair      Neurological Testing     Sensation     Lumbar   Left   Intact: light touch    Right   Intact: light touch    Active Range of Motion     Lumbar   Flexion:  with pain Restriction level: moderate  Extension:  with pain Restriction level: minimal  Left lateral flexion:  with pain Restriction level: minimal  Right lateral flexion:  with pain Restriction level: minimal    Strength/Myotome Testing     Left Hip   Planes of Motion   Flexion: 4-  Abduction: 4  Adduction: 4    Right Hip   Planes of Motion   Flexion: 4-  Abduction: 4  Adduction: 4    Left Knee   Flexion: 4  Extension: 4    Right Knee   Flexion: 4  Extension: 4-    Ambulation   Weight-Bearing Status   Assistive device used: single point cane and rollator walker    Additional Weight-Bearing Status Details  Use of SPC for household ambulation   Use of rollator walker for prolonged community distances     Tolerance < 5 minutes without use of AD     Ambulation: Stairs   Pattern: non-reciprocal  Railings: one rail  Pattern: non-reciprocal  Railings: one rail    Observational Gait   Gait: antalgic   Decreased walking speed and stride length.              Re-eval Date: 24     Precautions: Can't lay fully supine       Manuals                                        Neuro Re-Ed         PPT        PPT with madhav         TA        TA + OH lifts        MTP/LTP         Palloff press                 Ther Ex        NuStep         Standing hip flex/abd/ext Flex/abd   HEP        Standing marches         SBB         Squats         Step-ups         Seated hip add HEP        LTR        SKTC        SLR        S/L SLR        Clamshells         Heel walk outs         Bridges                                 Ther Activity                        Gait Training                        Modalities

## 2024-11-14 ENCOUNTER — OFFICE VISIT (OUTPATIENT)
Dept: PHYSICAL THERAPY | Facility: HOME HEALTHCARE | Age: 19
End: 2024-11-14
Payer: COMMERCIAL

## 2024-11-14 DIAGNOSIS — M54.16 LUMBAR RADICULOPATHY: ICD-10-CM

## 2024-11-14 DIAGNOSIS — G89.29 CHRONIC BILATERAL LOW BACK PAIN WITHOUT SCIATICA: Primary | ICD-10-CM

## 2024-11-14 DIAGNOSIS — G89.29 CHRONIC BILATERAL LOW BACK PAIN, UNSPECIFIED WHETHER SCIATICA PRESENT: ICD-10-CM

## 2024-11-14 DIAGNOSIS — M54.50 CHRONIC BILATERAL LOW BACK PAIN WITHOUT SCIATICA: Primary | ICD-10-CM

## 2024-11-14 DIAGNOSIS — M54.50 CHRONIC BILATERAL LOW BACK PAIN, UNSPECIFIED WHETHER SCIATICA PRESENT: ICD-10-CM

## 2024-11-14 DIAGNOSIS — M79.18 MYOFASCIAL PAIN SYNDROME: ICD-10-CM

## 2024-11-14 DIAGNOSIS — G89.4 CHRONIC PAIN SYNDROME: ICD-10-CM

## 2024-11-14 PROCEDURE — 97112 NEUROMUSCULAR REEDUCATION: CPT

## 2024-11-14 PROCEDURE — 97110 THERAPEUTIC EXERCISES: CPT

## 2024-11-14 NOTE — PROGRESS NOTES
"Daily Note     Today's date: 2024  Patient name: Josephine Mckay  : 2005  MRN: [id number]  Referring provider: Hoa Silverman MD  Dx:   Encounter Diagnosis     ICD-10-CM    1. Chronic bilateral low back pain without sciatica  M54.50     G89.29       2. Chronic bilateral low back pain, unspecified whether sciatica present  M54.50     G89.29       3. Myofascial pain syndrome  M79.18       4. Chronic pain syndrome  G89.4       5. Lumbar radiculopathy  M54.16                      Subjective: Pt reports she has pain in her upper to low back and both knees.       Objective: See treatment diary below      Assessment: Tolerated treatment poor. Verbal cues needed t/o exercises to perform correctly. Pt reports pain in her back and Ishaan knees t/o all exercises. Pt challenged with standing hip flex/abd.  Core and Ishaan LE strength deficits evident.  Fatigue noted during exercise. Patient would benefit from continued PT      Plan: Continue per plan of care.      Re-eval Date: 24     Precautions: Can't lay fully supine       Manuals                                       Neuro Re-Ed         PPT        PPT with marches         TA  Seated   5\"x10 VC's for breathing      TA + OH lifts        MTP/LTP  Red 1x10 ea       Palloff press                 Ther Ex        NuStep   L1   6'      Standing hip flex/abd/ext Flex/abd   HEP  1x5 ea Ishaan  flex/abd       Standing marches   Seated   1x10 L  1x5 R       SBB   5\"x10       Squats   1x10       Step-ups         Seated hip add HEP  5\"1x15      LTR        SKTC        SLR        S/L SLR        Clamshells         Heel walk outs         Bridges         LAQ  3\" 1x10 Ishaan                       Ther Activity                        Gait Training                        Modalities                                "

## 2024-11-18 ENCOUNTER — APPOINTMENT (OUTPATIENT)
Dept: PHYSICAL THERAPY | Facility: HOME HEALTHCARE | Age: 19
End: 2024-11-18
Payer: COMMERCIAL

## 2024-11-19 ENCOUNTER — OFFICE VISIT (OUTPATIENT)
Dept: PHYSICAL THERAPY | Facility: HOME HEALTHCARE | Age: 19
End: 2024-11-19
Payer: COMMERCIAL

## 2024-11-19 DIAGNOSIS — M79.18 MYOFASCIAL PAIN SYNDROME: ICD-10-CM

## 2024-11-19 DIAGNOSIS — G89.29 CHRONIC BILATERAL LOW BACK PAIN WITHOUT SCIATICA: Primary | ICD-10-CM

## 2024-11-19 DIAGNOSIS — M54.16 LUMBAR RADICULOPATHY: ICD-10-CM

## 2024-11-19 DIAGNOSIS — M54.50 CHRONIC BILATERAL LOW BACK PAIN WITHOUT SCIATICA: Primary | ICD-10-CM

## 2024-11-19 DIAGNOSIS — G89.4 CHRONIC PAIN SYNDROME: ICD-10-CM

## 2024-11-19 DIAGNOSIS — G89.29 CHRONIC BILATERAL LOW BACK PAIN, UNSPECIFIED WHETHER SCIATICA PRESENT: ICD-10-CM

## 2024-11-19 DIAGNOSIS — M54.50 CHRONIC BILATERAL LOW BACK PAIN, UNSPECIFIED WHETHER SCIATICA PRESENT: ICD-10-CM

## 2024-11-19 PROCEDURE — 97110 THERAPEUTIC EXERCISES: CPT

## 2024-11-19 PROCEDURE — 97112 NEUROMUSCULAR REEDUCATION: CPT

## 2024-11-19 NOTE — PROGRESS NOTES
"Daily Note     Today's date: 2024  Patient name: Josephine Mckay  : 2005  MRN: [id number]  Referring provider: Hoa Silverman MD  Dx:   Encounter Diagnosis     ICD-10-CM    1. Chronic bilateral low back pain without sciatica  M54.50     G89.29       2. Chronic bilateral low back pain, unspecified whether sciatica present  M54.50     G89.29       3. Myofascial pain syndrome  M79.18       4. Chronic pain syndrome  G89.4       5. Lumbar radiculopathy  M54.16                      Subjective: Josephine reports increased LBP from moving into a new house.       Objective: See treatment diary below      Assessment: Tolerated treatment poor. Limited tolerance to all TE, sitting slightly more tolerable than standing. Poor WB tolerance, R>L, with heavy reliance on UE support for standing tasks. She presents with limited hip, knee, L/s mobility. CG for LPD, she is able to self correct posterior LOB 1x. Continued PT would be beneficial to improve function.          Plan: Continue per plan of care.       Re-eval Date: 24     Precautions: Can't lay fully supine       Manuals                                      Neuro Re-Ed         PPT        PPT with          TA  Seated   5\"x10 VC's for breathing Seated   5\"x10 VC's for breathing     TA + OH lifts        MTP/LTP  Red 1x10 ea  Red 1x10 ea MTP seated, LTP standing     Palloff press                 Ther Ex        NuStep   L1   6' L1   6'     Standing hip flex/abd/ext Flex/abd   HEP  1x5 ea Ishaan  flex/abd  6x abd  bilat 10x flex     Standing marches   Seated   1x10 L  1x5 R  Seated   1x10 L  1x5 R      SBB   5\"x10  2\"x10     Squats   1x10  1x10     Step-ups         Seated hip add HEP  5\"1x15 5\"1x15     LTR        SKTC        SLR        S/L SLR        Clamshells         Heel walk outs         Bridges         LAQ  3\" 1x10 Ishaan  3\" 1x10 Ishaan                      Ther Activity                        Gait Training                      "   Modalities

## 2024-11-21 ENCOUNTER — APPOINTMENT (OUTPATIENT)
Dept: PHYSICAL THERAPY | Facility: HOME HEALTHCARE | Age: 19
End: 2024-11-21
Payer: COMMERCIAL

## 2024-11-22 ENCOUNTER — OFFICE VISIT (OUTPATIENT)
Dept: FAMILY MEDICINE CLINIC | Facility: CLINIC | Age: 19
End: 2024-11-22
Payer: COMMERCIAL

## 2024-11-22 VITALS
DIASTOLIC BLOOD PRESSURE: 70 MMHG | HEIGHT: 62 IN | WEIGHT: 293 LBS | SYSTOLIC BLOOD PRESSURE: 112 MMHG | BODY MASS INDEX: 53.92 KG/M2 | TEMPERATURE: 97.6 F | OXYGEN SATURATION: 97 % | HEART RATE: 79 BPM

## 2024-11-22 DIAGNOSIS — G89.29 CHRONIC BILATERAL LOW BACK PAIN WITHOUT SCIATICA: Primary | ICD-10-CM

## 2024-11-22 DIAGNOSIS — Z11.1 SCREENING FOR TUBERCULOSIS: ICD-10-CM

## 2024-11-22 DIAGNOSIS — M54.50 CHRONIC BILATERAL LOW BACK PAIN WITHOUT SCIATICA: Primary | ICD-10-CM

## 2024-11-22 DIAGNOSIS — F33.2 SEVERE EPISODE OF RECURRENT MAJOR DEPRESSIVE DISORDER, WITHOUT PSYCHOTIC FEATURES (HCC): ICD-10-CM

## 2024-11-22 DIAGNOSIS — F43.10 PTSD (POST-TRAUMATIC STRESS DISORDER): ICD-10-CM

## 2024-11-22 PROCEDURE — 99213 OFFICE O/P EST LOW 20 MIN: CPT | Performed by: FAMILY MEDICINE

## 2024-11-22 RX ORDER — QUETIAPINE FUMARATE 100 MG/1
100-200 TABLET, FILM COATED ORAL
COMMUNITY
Start: 2024-11-11

## 2024-11-22 RX ORDER — METHOCARBAMOL 500 MG/1
500 TABLET, FILM COATED ORAL 3 TIMES DAILY PRN
Qty: 90 TABLET | Refills: 0 | Status: SHIPPED | OUTPATIENT
Start: 2024-11-22

## 2024-11-22 RX ORDER — VENLAFAXINE HYDROCHLORIDE 37.5 MG/1
1 CAPSULE, EXTENDED RELEASE ORAL DAILY
COMMUNITY
Start: 2024-11-11

## 2024-11-22 RX ORDER — LUMATEPERONE 42 MG/1
CAPSULE ORAL
COMMUNITY
Start: 2024-11-13

## 2024-11-22 NOTE — ASSESSMENT & PLAN NOTE
Patient has no homicidal or suicidal ideation or plan.  She is currently seen by the [facility] in [facility].  She sees a psychiatrist.  The psychiatrist is managing all medications.

## 2024-11-22 NOTE — PROGRESS NOTES
Name: Josephine Mckay      : 2005      MRN: [id number]  Encounter Provider: Conrad Hernandez DO  Encounter Date: 2024   Encounter department: [facility]  :  Assessment & Plan  Chronic bilateral low back pain without sciatica  Patient has a history of chronic low back pain.  Patient is seeing pain management.  Patient presents for refill on her methocarbamol.  No loss of bowel or bladder function.  No saddle anesthesia or paresthesias.  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  Orders:    Quantiferon TB Gold Plus Assay; Future    Severe episode of recurrent major depressive disorder, without psychotic features (HCC)  Patient has no homicidal or suicidal ideation or plan.  She is currently seen by the [facility] clinic in [place] and.  She sees a psychiatrist.  The psychiatrist is managing all medications.         PTSD (post-traumatic stress disorder)  Patient is followed by psychiatry in [place] and at the [facility].  Medications are being adjusted.              History of Present Illness     The patient is a pleasant 19-year-old female who presents today for refills on medication.  Patient has chronic low back pain with muscle spasm.  Patient has seen Dr. Jade in the past who has been prescribing methocarbamol.  The patient also sees a psychiatrist at the [facility] clinic in [facility].  The psychiatrist is adjusting all medications.  The patient is a home health aide currently.  She states her back pain is relieved with the methocarbamol.  No loss of bowel or bladder function.  No saddle anesthesias.  Patient states her psychiatric condition is stable.      Review of Systems   Constitutional:  Negative for chills and fever.   HENT:  Negative for ear  "pain and sore throat.    Eyes:  Negative for pain and visual disturbance.   Respiratory:  Negative for cough and shortness of breath.    Cardiovascular:  Negative for chest pain and palpitations.   Gastrointestinal:  Negative for abdominal pain and vomiting.   Genitourinary:  Negative for dysuria and hematuria.   Musculoskeletal:  Positive for back pain. Negative for arthralgias.   Skin:  Negative for color change and rash.   Neurological:  Negative for seizures and syncope.   Psychiatric/Behavioral: Negative.     All other systems reviewed and are negative.         Objective   /70   Pulse 79   Temp 97.6 °F (36.4 °C)   Ht 5' 2\" (1.575 m)   Wt (!) 147 kg (325 lb)   SpO2 97%   BMI 59.44 kg/m²      Physical Exam  Vitals and nursing note reviewed.   Constitutional:       General: She is not in acute distress.     Appearance: She is well-developed. She is obese. She is not ill-appearing, toxic-appearing or diaphoretic.   HENT:      Head: Normocephalic and atraumatic.   Eyes:      Conjunctiva/sclera: Conjunctivae normal.   Cardiovascular:      Rate and Rhythm: Normal rate and regular rhythm.      Heart sounds: No murmur heard.  Pulmonary:      Effort: Pulmonary effort is normal. No respiratory distress.      Breath sounds: Normal breath sounds.   Abdominal:      General: Bowel sounds are normal.      Palpations: Abdomen is soft.      Tenderness: There is no abdominal tenderness.   Musculoskeletal:         General: Tenderness (Mild tenderness paravertebral musculature of the lumbar spine) present. No swelling.      Cervical back: Neck supple.   Skin:     General: Skin is warm and dry.      Capillary Refill: Capillary refill takes less than 2 seconds.   Neurological:      Mental Status: She is alert.   Psychiatric:         Mood and Affect: Mood normal.         Behavior: Behavior normal.         Thought Content: Thought content normal.         Judgment: Judgment normal.         "

## 2024-11-22 NOTE — ASSESSMENT & PLAN NOTE
Patient has a history of chronic low back pain.  Patient is seeing pain management.  Patient presents for refill on her methocarbamol.  No loss of bowel or bladder function.  No saddle anesthesia or paresthesias.  Orders:    methocarbamol (ROBAXIN) 500 mg tablet; Take 1 tablet (500 mg total) by mouth 3 (three) times a day as needed for muscle spasms

## 2024-11-25 ENCOUNTER — TELEPHONE (OUTPATIENT)
Dept: ADMINISTRATIVE | Facility: OTHER | Age: 19
End: 2024-11-25

## 2024-11-25 ENCOUNTER — APPOINTMENT (OUTPATIENT)
Dept: PHYSICAL THERAPY | Facility: HOME HEALTHCARE | Age: 19
End: 2024-11-25
Payer: COMMERCIAL

## 2024-11-25 ENCOUNTER — TELEPHONE (OUTPATIENT)
Age: 19
End: 2024-11-25

## 2024-11-25 NOTE — TELEPHONE ENCOUNTER
Patient is currently trying to give plasma and is unable to due to her allergy of latex being listed as a high severity with swelling.  She states this is incorrect, that her allergy is only mild and she gets a rash.  She requested this be changed asap because she is there currently for her appointment.

## 2024-11-25 NOTE — TELEPHONE ENCOUNTER
I spoke with the pt and told her that Dr Hernandez is not in the office today and we can not change her allergy with out him reviewing her chart.   She states that is ok she know has paper work he will need to fill out. She will drop it off in the office for Dr Hernandez to fill out

## 2024-11-25 NOTE — TELEPHONE ENCOUNTER
----- Message from Xiomara SCHOFIELD sent at 11/22/2024  1:15 PM EST -----  Regarding: pap smear  11/22/24 1:16 PM    Favian, our patient Josephine Mckay has had Pap Smear (HPV) aka Cervical Cancer Screening completed/performed. Please assist in updating the patient chart by making an External outreach to Dr Saldana facility located in [place]. The date of service is 4/30/24.    Thank you,  Xiomara Irving MA  [facility]

## 2024-11-26 NOTE — TELEPHONE ENCOUNTER
Upon review of the In Basket request we have noted that procedure was not completed. Encounter 4/30/24 states no indicated at this time.     Any additional questions or concerns should be emailed to the Practice Liaisons via the appropriate education email address, please do not reply via In Basket.    Thank you  Jessie Ramirez   [facility]

## 2024-12-03 ENCOUNTER — HOSPITAL ENCOUNTER (EMERGENCY)
Facility: HOSPITAL | Age: 19
Discharge: HOME | End: 2024-12-03
Attending: EMERGENCY MEDICINE
Payer: COMMERCIAL

## 2024-12-03 ENCOUNTER — APPOINTMENT (OUTPATIENT)
Dept: LAB | Facility: HOSPITAL | Age: 19
End: 2024-12-03
Payer: COMMERCIAL

## 2024-12-03 ENCOUNTER — APPOINTMENT (EMERGENCY)
Dept: RADIOLOGY | Facility: HOSPITAL | Age: 19
End: 2024-12-03
Payer: COMMERCIAL

## 2024-12-03 VITALS
HEIGHT: 62 IN | SYSTOLIC BLOOD PRESSURE: 120 MMHG | BODY MASS INDEX: 53.92 KG/M2 | WEIGHT: 293 LBS | HEART RATE: 98 BPM | TEMPERATURE: 98.5 F | OXYGEN SATURATION: 97 % | DIASTOLIC BLOOD PRESSURE: 90 MMHG | RESPIRATION RATE: 20 BRPM

## 2024-12-03 DIAGNOSIS — Z11.1 SCREENING FOR TUBERCULOSIS: ICD-10-CM

## 2024-12-03 DIAGNOSIS — J40 BRONCHITIS: Primary | ICD-10-CM

## 2024-12-03 LAB
FLUAV AG UPPER RESP QL IA.RAPID: NEGATIVE
FLUBV AG UPPER RESP QL IA.RAPID: NEGATIVE
SARS-COV+SARS-COV-2 AG RESP QL IA.RAPID: NEGATIVE

## 2024-12-03 PROCEDURE — 36415 COLL VENOUS BLD VENIPUNCTURE: CPT

## 2024-12-03 PROCEDURE — 87811 SARS-COV-2 COVID19 W/OPTIC: CPT | Performed by: PHYSICIAN ASSISTANT

## 2024-12-03 PROCEDURE — 94640 AIRWAY INHALATION TREATMENT: CPT

## 2024-12-03 PROCEDURE — 71045 X-RAY EXAM CHEST 1 VIEW: CPT

## 2024-12-03 PROCEDURE — 99283 EMERGENCY DEPT VISIT LOW MDM: CPT

## 2024-12-03 PROCEDURE — 87804 INFLUENZA ASSAY W/OPTIC: CPT | Performed by: PHYSICIAN ASSISTANT

## 2024-12-03 PROCEDURE — 99284 EMERGENCY DEPT VISIT MOD MDM: CPT | Performed by: PHYSICIAN ASSISTANT

## 2024-12-03 PROCEDURE — 86480 TB TEST CELL IMMUN MEASURE: CPT

## 2024-12-03 RX ORDER — IPRATROPIUM BROMIDE AND ALBUTEROL SULFATE 2.5; .5 MG/3ML; MG/3ML
3 SOLUTION RESPIRATORY (INHALATION) ONCE
Status: COMPLETED | OUTPATIENT
Start: 2024-12-03 | End: 2024-12-03

## 2024-12-03 RX ORDER — AZITHROMYCIN 250 MG/1
TABLET, FILM COATED ORAL
Qty: 6 TABLET | Refills: 0 | Status: SHIPPED | OUTPATIENT
Start: 2024-12-03 | End: 2024-12-07

## 2024-12-03 RX ORDER — PREDNISONE 50 MG/1
50 TABLET ORAL DAILY
Qty: 5 TABLET | Refills: 0 | Status: SHIPPED | OUTPATIENT
Start: 2024-12-03

## 2024-12-03 RX ORDER — ALBUTEROL SULFATE 90 UG/1
2 INHALANT RESPIRATORY (INHALATION) EVERY 6 HOURS PRN
Qty: 18 G | Refills: 0 | Status: SHIPPED | OUTPATIENT
Start: 2024-12-03

## 2024-12-03 RX ADMIN — IPRATROPIUM BROMIDE AND ALBUTEROL SULFATE 3 ML: .5; 3 SOLUTION RESPIRATORY (INHALATION) at 17:24

## 2024-12-03 NOTE — ED PROVIDER NOTES
Time reflects when diagnosis was documented in both MDM as applicable and the Disposition within this note       Time User Action Codes Description Comment    12/3/2024  6:24 PM Robert García Add [J40] Bronchitis           ED Disposition       ED Disposition   Discharge    Condition   Stable    Date/Time   Tue Dec 3, 2024  6:24 PM    Comment   Josephine Mckay discharge to home/self care.                   Assessment & Plan       Medical Decision Making  This is a 19-year-old female presenting for evaluation of cough congestion x 2 weeks.  History of asthma is an everyday smoker.  Differential diagnosis includes COVID, influenza, RSV, bronchitis, viral URI with cough.    Chest x-ray is not consistent with acute pneumonia COVID influenza swab is negative given her overall status of being an asthmatic obesity a smoker we will treat this as well as utilize steroid and inhaler because she is out of this.  Stable for discharge home.    Amount and/or Complexity of Data Reviewed  Labs: ordered. Decision-making details documented in ED Course.  Radiology: ordered and independent interpretation performed.    Risk  Prescription drug management.        ED Course as of 12/03/24 1827   Tue Dec 03, 2024   1709 SpO2: 98 %   1709 Respirations: 20   1709 Pulse: 100   1709 Temperature: 98.5 °F (36.9 °C)   1709 Blood Pressure: 132/75   1823 Influenza B Rapid Antigen: Negative   1823 Influenza A Rapid Antigen: Negative   1823 SARS COV Rapid Antigen: Negative       Medications   ipratropium-albuterol (DUO-NEB) 0.5-2.5 mg/3 mL inhalation solution 3 mL (3 mL Nebulization Given 12/3/24 1724)       ED Risk Strat Scores                           SBIRT 20yo+      Flowsheet Row Most Recent Value   Initial Alcohol Screen: US AUDIT-C     1. How often do you have a drink containing alcohol? 0 Filed at: 12/03/2024 1708   2. How many drinks containing alcohol do you have on a typical day you are drinking?  0 Filed at: 12/03/2024 1708   3b. FEMALE  Any Age, or MALE 65+: How often do you have 4 or more drinks on one occassion? 0 Filed at: 12/03/2024 1708   Audit-C Score 0 Filed at: 12/03/2024 1708   NESSA: How many times in the past year have you...    Used an illegal drug or used a prescription medication for non-medical reasons? Never Filed at: 12/03/2024 1708                            History of Present Illness       Chief Complaint   Patient presents with    Cough     Pt thinks she has bronchitis   has been sick for 2 weeks now with worsening cough   pt on cell phone and cuts RN off       Past Medical History:   Diagnosis Date    ADHD (attention deficit hyperactivity disorder)     Anxiety     Asthma     Bipolar disorder (HCC)     Depression     Environmental allergies     PONV (postoperative nausea and vomiting)     PTSD (post-traumatic stress disorder)       Past Surgical History:   Procedure Laterality Date    FL ARTHRD MIDTARSL/TARSOMETATARSAL MULT/TRANSVRS Left 10/13/2022    Procedure: ARTHRODESIS / FUSION FOOT;  Surgeon: Houston Mercado DPM;  Location: [facility];  Service: Podiatry    FL OPEN TREATMENT TARSOMETATARSAL JOINT DISLOCATION Left 5/6/2022    Procedure: ORIF OF LEFT FOOT UTILIZING PLATES AND SCREW AS NECESSARY;  Surgeon: Houston Mrecado DPM;  Location: [facility];  Service: Podiatry    FL REMOVAL IMPLANT DEEP Left 7/28/2022    Procedure: REMOVAL HARDWARE FOOT;  Surgeon: Houston Mercado DPM;  Location: [facility];  Service: Podiatry      Family History   Problem Relation Age of Onset    Thyroid cancer Sister     Autism Brother     Diabetes type II Maternal Grandmother       Social History     Tobacco Use    Smoking status: Every Day     Current packs/day: 1.00     Types: Cigarettes    Smokeless tobacco: Never    Tobacco comments:     cutting back   Vaping Use    Vaping status: Some Days    Substances: Nicotine, Flavoring   Substance Use Topics    Alcohol use: Yes     Comment: Occasional    Drug use: Yes      Types: Marijuana      E-Cigarette/Vaping    E-Cigarette Use Current Some Day User       E-Cigarette/Vaping Substances    Nicotine Yes     THC No     CBD No     Flavoring Yes     Other No     Unknown No       I have reviewed and agree with the history as documented.     This is a 19-year-old female presenting to the emergency department today offering a 2-week history of congestion coughing.  She has a history of asthma.  She states she is an everyday smoker as well.   she speaks in full sentences however does have a nonproductive cough here at bedside.  Vital signs reviewed within normal limits.      Cough  Associated symptoms: shortness of breath    Associated symptoms: no chest pain, no chills, no fever, no headaches, no sore throat and no wheezing        Review of Systems   Constitutional: Negative.  Negative for chills and fever.   HENT:  Positive for congestion. Negative for sore throat and trouble swallowing.    Eyes: Negative.    Respiratory:  Positive for cough and shortness of breath. Negative for wheezing.    Cardiovascular: Negative.  Negative for chest pain and leg swelling.   Gastrointestinal: Negative.  Negative for abdominal pain, blood in stool and vomiting.   Endocrine: Negative.    Genitourinary: Negative.    Musculoskeletal: Negative.  Negative for neck stiffness.   Skin: Negative.    Allergic/Immunologic: Negative.    Neurological: Negative.  Negative for dizziness, seizures, speech difficulty, weakness, light-headedness, numbness and headaches.   Hematological: Negative.    Psychiatric/Behavioral: Negative.     All other systems reviewed and are negative.          Objective       ED Triage Vitals [12/03/24 1705]   Temperature Pulse Blood Pressure Respirations SpO2 Patient Position - Orthostatic VS   98.5 °F (36.9 °C) 100 132/75 20 98 % Sitting      Temp Source Heart Rate Source BP Location FiO2 (%) Pain Score    Temporal Monitor Left arm -- No Pain      Vitals      Date and Time Temp Pulse SpO2  Resp BP Pain Score FACES Pain Rating User   12/03/24 1709 -- -- -- -- -- No Pain -- EZ   12/03/24 1705 98.5 °F (36.9 °C) 100 98 % 20 132/75 No Pain -- EZ            Physical Exam  Constitutional:       General: She is not in acute distress.     Appearance: She is well-developed. She is obese. She is not ill-appearing, toxic-appearing or diaphoretic.   HENT:      Right Ear: External ear normal. No swelling. Tympanic membrane is not bulging.      Left Ear: External ear normal. No swelling. Tympanic membrane is not bulging.      Nose: Nose normal. No congestion or rhinorrhea.      Mouth/Throat:      Pharynx: No oropharyngeal exudate.   Eyes:      General: Lids are normal.      Conjunctiva/sclera: Conjunctivae normal.      Pupils: Pupils are equal, round, and reactive to light.   Neck:      Thyroid: No thyromegaly.      Vascular: No JVD.      Trachea: No tracheal deviation.   Cardiovascular:      Rate and Rhythm: Normal rate and regular rhythm.      Pulses: Normal pulses.      Heart sounds: Normal heart sounds. No murmur heard.     No friction rub. No gallop.   Pulmonary:      Effort: Pulmonary effort is normal. No respiratory distress.      Breath sounds: No stridor. Wheezing and rhonchi present. No rales.   Chest:      Chest wall: No tenderness.   Abdominal:      General: Bowel sounds are normal. There is no distension.      Palpations: Abdomen is soft. There is no mass.      Tenderness: There is no abdominal tenderness. There is no guarding or rebound.      Hernia: No hernia is present.   Musculoskeletal:         General: Normal range of motion.      Cervical back: Normal range of motion and neck supple. No edema. Normal range of motion.      Right lower leg: No edema.      Left lower leg: No edema.   Lymphadenopathy:      Cervical: No cervical adenopathy.   Skin:     General: Skin is warm and dry.      Coloration: Skin is not pale.      Findings: No erythema or rash.   Neurological:      General: No focal deficit  present.      Mental Status: She is alert and oriented to person, place, and time.      GCS: GCS eye subscore is 4. GCS verbal subscore is 5. GCS motor subscore is 6.      Cranial Nerves: No cranial nerve deficit.      Sensory: No sensory deficit.      Deep Tendon Reflexes: Reflexes are normal and symmetric.   Psychiatric:         Speech: Speech normal.         Behavior: Behavior normal.         Results Reviewed       Procedure Component Value Units Date/Time    FLU/COVID Rapid Antigen (30 min. TAT) - Preferred screening test in ED [[id number]]  (Normal) Collected: 12/03/24 1720    Lab Status: Final result Specimen: Nares from Nose Updated: 12/03/24 1817     SARS COV Rapid Antigen Negative     Influenza A Rapid Antigen Negative     Influenza B Rapid Antigen Negative    Narrative:      This test has been performed using the Parascaleidel Amy 2 FLU+SARS Antigen test under the Emergency Use Authorization (EUA). This test has been validated by the  and verified by the performing laboratory. The Amy uses lateral flow immunofluorescent sandwich assay to detect SARS-COV, Influenza A and Influenza B Antigen.     The Quidel Amy 2 SARS Antigen test does not differentiate between SARS-CoV and SARS-CoV-2.     Negative results are presumptive and may be confirmed with a molecular assay, if necessary, for patient management. Negative results do not rule out SARS-CoV-2 or influenza infection and should not be used as the sole basis for treatment or patient management decisions. A negative test result may occur if the level of antigen in a sample is below the limit of detection of this test.     Positive results are indicative of the presence of viral antigens, but do not rule out bacterial infection or co-infection with other viruses.     All test results should be used as an adjunct to clinical observations and other information available to the provider.    FOR PEDIATRIC PATIENTS - copy/paste COVID Guidelines URL to  browser: [email or web address]            XR chest 1 view portable   ED Interpretation by Robert García PA-C (12/03 1729)   No evidence of acute consolidative process      Final Interpretation by To Rizzo MD (12/03 1824)      Slightly limited but unremarkable chest radiograph            Workstation performed: [id number]             Procedures    ED Medication and Procedure Management   Prior to Admission Medications   Prescriptions Last Dose Informant Patient Reported? Taking?   ALPRAZolam (XANAX) 0.25 mg tablet  Self Yes No   Sig: Take 0.25 mg by mouth Three times daily as needed   Caplyta 42 MG CAPS capsule   Yes No   QUEtiapine (SEROquel) 100 mg tablet   Yes No   Sig: Take 100-200 mg by mouth daily at bedtime   methocarbamol (ROBAXIN) 500 mg tablet   No No   Sig: Take 1 tablet (500 mg total) by mouth 3 (three) times a day as needed for muscle spasms   venlafaxine (EFFEXOR-XR) 37.5 mg 24 hr capsule   Yes No   Sig: Take 1 capsule by mouth in the morning      Facility-Administered Medications: None     Patient's Medications   Discharge Prescriptions    ALBUTEROL (VENTOLIN HFA) 90 MCG/ACT INHALER    Inhale 2 puffs every 6 (six) hours as needed for wheezing       Start Date: 12/3/2024 End Date: --       Order Dose: 2 puffs       Quantity: 18 g    Refills: 0    AZITHROMYCIN (ZITHROMAX Z-DUNCAN) 250 MG TABLET    Take 2 tablets today then 1 tablet daily x 4 days       Start Date: 12/3/2024 End Date: 12/7/2024       Order Dose: --       Quantity: 6 tablet    Refills: 0    PREDNISONE 50 MG TABLET    Take 1 tablet (50 mg total) by mouth daily       Start Date: 12/3/2024 End Date: --       Order Dose: 50 mg       Quantity: 5 tablet    Refills: 0     No discharge procedures on file.  ED SEPSIS DOCUMENTATION   Time reflects when diagnosis was documented in both MDM as applicable and the Disposition within this note       Time User Action Codes Description  Comment    12/3/2024  6:24 PM Robert García Add [J40] Bronchitis                  Robert García PA-C  12/03/24 1827

## 2024-12-04 ENCOUNTER — RESULTS FOLLOW-UP (OUTPATIENT)
Dept: FAMILY MEDICINE CLINIC | Facility: CLINIC | Age: 19
End: 2024-12-04

## 2024-12-04 LAB
GAMMA INTERFERON BACKGROUND BLD IA-ACNC: 0.15 IU/ML
M TB IFN-G BLD-IMP: NEGATIVE
M TB IFN-G CD4+ BCKGRND COR BLD-ACNC: -0.01 IU/ML
M TB IFN-G CD4+ BCKGRND COR BLD-ACNC: 0 IU/ML
MITOGEN IGNF BCKGRD COR BLD-ACNC: 8.08 IU/ML

## 2024-12-06 ENCOUNTER — TELEPHONE (OUTPATIENT)
Dept: NEUROLOGY | Facility: CLINIC | Age: 19
End: 2024-12-06

## 2024-12-06 NOTE — TELEPHONE ENCOUNTER
Called pt to confirm appointment 12/10. Pt requested to reschedule. Explained to pt that the referral that was put in for this appointment is . Explained to pt to call her GYN who put the referral through to see if they can send us an updated one. Pt agreed. Explained to pt that I'll cancel her upcoming appointment for her and wait for her to call back to reschedule.

## 2024-12-10 ENCOUNTER — APPOINTMENT (EMERGENCY)
Dept: RADIOLOGY | Facility: HOSPITAL | Age: 19
End: 2024-12-10
Payer: COMMERCIAL

## 2024-12-10 ENCOUNTER — HOSPITAL ENCOUNTER (EMERGENCY)
Facility: HOSPITAL | Age: 19
Discharge: HOME | End: 2024-12-10
Attending: EMERGENCY MEDICINE
Payer: COMMERCIAL

## 2024-12-10 VITALS
DIASTOLIC BLOOD PRESSURE: 73 MMHG | OXYGEN SATURATION: 97 % | BODY MASS INDEX: 59.44 KG/M2 | WEIGHT: 293 LBS | RESPIRATION RATE: 18 BRPM | HEART RATE: 77 BPM | TEMPERATURE: 98.7 F | SYSTOLIC BLOOD PRESSURE: 119 MMHG

## 2024-12-10 DIAGNOSIS — J40 BRONCHITIS: Primary | ICD-10-CM

## 2024-12-10 PROCEDURE — 99284 EMERGENCY DEPT VISIT MOD MDM: CPT | Performed by: PHYSICIAN ASSISTANT

## 2024-12-10 PROCEDURE — 71046 X-RAY EXAM CHEST 2 VIEWS: CPT

## 2024-12-10 PROCEDURE — 93005 ELECTROCARDIOGRAM TRACING: CPT

## 2024-12-10 PROCEDURE — 99285 EMERGENCY DEPT VISIT HI MDM: CPT

## 2024-12-10 NOTE — DISCHARGE INSTRUCTIONS
Continue OTC medications targeted towards your symptoms.  Would recommend the zpack and steroid as previously prescribed.  Continue your inhaler as well as nebulizer.  Follow up with PCP or return to ER as needed.

## 2024-12-10 NOTE — ED PROVIDER NOTES
Time reflects when diagnosis was documented in both MDM as applicable and the Disposition within this note       Time User Action Codes Description Comment    12/10/2024 12:22 PM Alina Viveros Add [J40] Bronchitis           ED Disposition       ED Disposition   Discharge    Condition   Stable    Date/Time   Tue Dec 10, 2024 12:22 PM    Comment   Josephine Mckay discharge to home/self care.                   Assessment & Plan       Medical Decision Making  18 yo female presenting for evaluation of continued cough/congestion.  Prior records reviewed.  Reported exposure to human metapneumovirus.  She is afebrile, hemodynamically stable.  Respiratory status stable on room air.    EKG non ischemic.  CXR without findings of pneumonia or acute process.  PERC negative, no tachycardia, tachypnea or hypoxia.  Doubt PE.  Discussed that the panel her family member had was an inpatient test.  No indications for admission/hospitalization and therefore not able to order this particular panel.  Discussed that this virus is treated with symptomatic management and we would assume she has it based on close contact with similar symptoms.  She can consider taking the previously prescribed meds for bronchitis, at least the steroid and inhaler given her asthma history.      Reviewed symptomatic management.  OTC meds reviewed.  Anticipatory guidance.  Advised recheck with PCP or return to ER as needed.  Strict return precautions outlined.  Patient voiced understanding and had no further questions.    Please refer to above ER course for further details/discussion.      Problems Addressed:  Bronchitis: acute illness or injury    Amount and/or Complexity of Data Reviewed  External Data Reviewed: labs, radiology and notes.  Radiology: ordered and independent interpretation performed. Decision-making details documented in ED Course.    Risk  OTC drugs.  Prescription drug management.        ED Course as of 12/10/24 1255   Tue Dec 10, 2024    1047 Pt was seen here on 12/3/24 with diagnosis of bronchitis.  Covid/flu negative. Treated with zpack, prednisone, albuterol inhaler.   1149 XR chest 2 views  Independently viewed and interpreted by me - no acute cardiopulmonary process; pending official read.   1217 Pt reassessed.       Medications - No data to display    ED Risk Strat Scores                       PERC Rule for PE      Flowsheet Row Most Recent Value   PERC Rule for PE    Age >=50 0 Filed at: 12/10/2024 1054   HR >=100 0 Filed at: 12/10/2024 1054   O2 Sat on room air < 95% 0 Filed at: 12/10/2024 1054   History of PE or DVT 0 Filed at: 12/10/2024 1054   Recent trauma or surgery 0 Filed at: 12/10/2024 1054   Hemoptysis 0 Filed at: 12/10/2024 1054   Exogenous estrogen 0 Filed at: 12/10/2024 1054   Unilateral leg swelling 0 Filed at: 12/10/2024 1054   PERC Rule for PE Results 0 Filed at: 12/10/2024 1054            SBIRT 20yo+      Flowsheet Row Most Recent Value   Initial Alcohol Screen: US AUDIT-C     1. How often do you have a drink containing alcohol? 0 Filed at: 12/10/2024 1052   2. How many drinks containing alcohol do you have on a typical day you are drinking?  0 Filed at: 12/10/2024 1052   3a. Male UNDER 65: How often do you have five or more drinks on one occasion? 0 Filed at: 12/10/2024 1052   3b. FEMALE Any Age, or MALE 65+: How often do you have 4 or more drinks on one occassion? 0 Filed at: 12/10/2024 1052   Audit-C Score 0 Filed at: 12/10/2024 1052   NESSA: How many times in the past year have you...    Used an illegal drug or used a prescription medication for non-medical reasons? Never Filed at: 12/10/2024 1052                            History of Present Illness       Chief Complaint   Patient presents with    Cough     Pt states that she was recently diagnosed with bronchitis and has been having chest pain, sob and a cough.        Past Medical History:   Diagnosis Date    ADHD (attention deficit hyperactivity disorder)     Anxiety      Asthma     Bipolar disorder (HCC)     Depression     Environmental allergies     PONV (postoperative nausea and vomiting)     PTSD (post-traumatic stress disorder)       Past Surgical History:   Procedure Laterality Date    KS ARTHRD MIDTARSL/TARSOMETATARSAL MULT/TRANSVRS Left 10/13/2022    Procedure: ARTHRODESIS / FUSION FOOT;  Surgeon: Houston Mercado DPM;  Location: [facility];  Service: Podiatry    KS OPEN TREATMENT TARSOMETATARSAL JOINT DISLOCATION Left 5/6/2022    Procedure: ORIF OF LEFT FOOT UTILIZING PLATES AND SCREW AS NECESSARY;  Surgeon: Houston Mercado DPM;  Location: [facility];  Service: Podiatry    KS REMOVAL IMPLANT DEEP Left 7/28/2022    Procedure: REMOVAL HARDWARE FOOT;  Surgeon: Houston Mercado DPM;  Location: [facility];  Service: Podiatry      Family History   Problem Relation Age of Onset    Thyroid cancer Sister     Autism Brother     Diabetes type II Maternal Grandmother       Social History     Tobacco Use    Smoking status: Every Day     Current packs/day: 1.00     Types: Cigarettes    Smokeless tobacco: Never    Tobacco comments:     cutting back   Vaping Use    Vaping status: Some Days    Substances: Nicotine, Flavoring   Substance Use Topics    Alcohol use: Yes     Comment: Occasional    Drug use: Yes     Types: Marijuana      E-Cigarette/Vaping    E-Cigarette Use Current Some Day User       E-Cigarette/Vaping Substances    Nicotine Yes     THC No     CBD No     Flavoring Yes     Other No     Unknown No       I have reviewed and agree with the history as documented.     19 year old female with PMH asthma, anxiety/depression/bipolar, ADHD presenting ambulatory from home for evaluation of cough.  Pt reports she has been sick for over a week.  Was seen here last week and diagnosed with bronchitis.  She reports runny nose, congestion and cough.  She reports associated chest tightness.  She reports she's had nausea and has been puking up phlegm.  Denies  recent fever, chills.  Denies abdominal pain, diarrhea.  No reported aggravating or alleviating factors.  Pt notes she was seen here last week and diagnosed with bronchitis.  She was placed on zpack, prednisone and given albuterol inhaler although pt reports she never picked up these scripts.  She notes she now found out her mom has human metapneumovirus and is admitted to the hospital.  She is concerned this is what she has.      History provided by:  Patient and medical records   used: No    Cough  Chronicity:  New  Smoker: yes    Context: sick contacts    Relieved by:  Nothing  Worsened by:  Nothing  Associated symptoms: rhinorrhea    Associated symptoms: no chest pain, no chills, no ear pain, no fever, no headaches, no rash, no shortness of breath, no sore throat and no wheezing    Risk factors: recent infection    Risk factors: no recent travel        Review of Systems   Constitutional:  Positive for fatigue. Negative for chills and fever.   HENT:  Positive for congestion and rhinorrhea. Negative for ear pain and sore throat.    Eyes: Negative.  Negative for visual disturbance.   Respiratory:  Positive for cough and chest tightness. Negative for shortness of breath and wheezing.    Cardiovascular: Negative.  Negative for chest pain, palpitations and leg swelling.   Gastrointestinal:  Positive for nausea and vomiting. Negative for abdominal pain, constipation and diarrhea.   Genitourinary: Negative.  Negative for dysuria, flank pain, frequency and hematuria.   Musculoskeletal: Negative.  Negative for back pain.   Skin: Negative.  Negative for rash.   Neurological: Negative.  Negative for dizziness, light-headedness and headaches.   Psychiatric/Behavioral: Negative.     All other systems reviewed and are negative.          Objective       ED Triage Vitals   Temperature Pulse Blood Pressure Respirations SpO2 Patient Position - Orthostatic VS   12/10/24 1054 12/10/24 1052 12/10/24 1052 12/10/24  1052 12/10/24 1052 12/10/24 1052   98.7 °F (37.1 °C) 98 130/73 18 96 % Sitting      Temp Source Heart Rate Source BP Location FiO2 (%) Pain Score    12/10/24 1054 12/10/24 1052 12/10/24 1052 -- 12/10/24 1052    Tympanic Monitor Right arm  7      Vitals      Date and Time Temp Pulse SpO2 Resp BP Pain Score FACES Pain Rating User   12/10/24 1222 -- 77 97 % -- -- -- -- KO   12/10/24 1115 -- 90 97 % 18 119/73 -- -- AB   12/10/24 1054 98.7 °F (37.1 °C) -- -- -- -- -- -- AB   12/10/24 1052 -- 98 96 % 18 130/73 7 -- AB            Physical Exam  Vitals and nursing note reviewed.   Constitutional:       General: She is awake. She is not in acute distress.     Appearance: She is well-developed. She is obese. She is not toxic-appearing or diaphoretic.   HENT:      Head: Normocephalic and atraumatic.      Right Ear: Hearing, tympanic membrane, ear canal and external ear normal.      Left Ear: Hearing, tympanic membrane, ear canal and external ear normal.      Nose: Congestion present.      Mouth/Throat:      Mouth: Mucous membranes are moist.      Pharynx: Oropharynx is clear. Uvula midline. No oropharyngeal exudate.   Eyes:      General: Lids are normal. No scleral icterus.     Conjunctiva/sclera: Conjunctivae normal.      Pupils: Pupils are equal, round, and reactive to light.   Neck:      Trachea: Trachea and phonation normal. No tracheal deviation.   Cardiovascular:      Rate and Rhythm: Normal rate and regular rhythm.      Pulses: Normal pulses.      Heart sounds: Normal heart sounds, S1 normal and S2 normal. No murmur heard.  Pulmonary:      Effort: Pulmonary effort is normal. No tachypnea or respiratory distress.      Breath sounds: Normal breath sounds. No wheezing, rhonchi or rales.      Comments: +freq coughing  Abdominal:      General: Bowel sounds are normal. There is no distension.      Palpations: Abdomen is soft.      Tenderness: There is no abdominal tenderness. There is no guarding or rebound.    Musculoskeletal:      Cervical back: Normal range of motion and neck supple.      Right lower leg: No edema.      Left lower leg: No edema.   Skin:     General: Skin is warm and dry.      Capillary Refill: Capillary refill takes less than 2 seconds.      Findings: No rash.   Neurological:      Mental Status: She is alert and oriented to person, place, and time.      Gait: Gait normal.   Psychiatric:         Mood and Affect: Mood normal.         Speech: Speech normal.         Behavior: Behavior normal. Behavior is cooperative.         Results Reviewed       None            XR chest 2 views   Final Interpretation by Conrad Carlin MD (12/10 1252)      No acute cardiopulmonary disease.            Resident: Joey Funes I, the attending radiologist, have reviewed the images and agree with the final report above.      Workstation performed: [id number]             ECG 12 Lead Documentation Only    Date/Time: 12/10/2024 11:04 AM    Performed by: Alina Viveros PA-C  Authorized by: Alina Viveros PA-C    Indications / Diagnosis:  Chest tightness  ECG reviewed by me, the ED Provider: yes    Patient location:  ED  Previous ECG:     Comparison to cardiac monitor: Yes    Interpretation:     Interpretation: normal    Rate:     ECG rate:  94    ECG rate assessment: normal    Rhythm:     Rhythm: sinus rhythm    Ectopy:     Ectopy: none    QRS:     QRS axis:  Normal    QRS intervals:  Normal  Conduction:     Conduction: normal    ST segments:     ST segments:  Normal  T waves:     T waves: normal    Comments:      , QRS 90, QT//452; no acute ischemic changes.      ED Medication and Procedure Management   Prior to Admission Medications   Prescriptions Last Dose Informant Patient Reported? Taking?   ALPRAZolam (XANAX) 0.25 mg tablet  Self Yes No   Sig: Take 0.25 mg by mouth Three times daily as needed   Caplyta 42 MG CAPS capsule   Yes No   QUEtiapine (SEROquel) 100 mg tablet   Yes No   Sig: Take  100-200 mg by mouth daily at bedtime   albuterol (Ventolin HFA) 90 mcg/act inhaler   No No   Sig: Inhale 2 puffs every 6 (six) hours as needed for wheezing   methocarbamol (ROBAXIN) 500 mg tablet   No No   Sig: Take 1 tablet (500 mg total) by mouth 3 (three) times a day as needed for muscle spasms   predniSONE 50 mg tablet   No No   Sig: Take 1 tablet (50 mg total) by mouth daily   venlafaxine (EFFEXOR-XR) 37.5 mg 24 hr capsule   Yes No   Sig: Take 1 capsule by mouth in the morning      Facility-Administered Medications: None     Discharge Medication List as of 12/10/2024 12:24 PM        CONTINUE these medications which have NOT CHANGED    Details   albuterol (Ventolin HFA) 90 mcg/act inhaler Inhale 2 puffs every 6 (six) hours as needed for wheezing, Starting Tue 12/3/2024, Normal      ALPRAZolam (XANAX) 0.25 mg tablet Take 0.25 mg by mouth Three times daily as needed, Historical Med      Caplyta 42 MG CAPS capsule Historical Med      methocarbamol (ROBAXIN) 500 mg tablet Take 1 tablet (500 mg total) by mouth 3 (three) times a day as needed for muscle spasms, Starting Fri 11/22/2024, Normal      predniSONE 50 mg tablet Take 1 tablet (50 mg total) by mouth daily, Starting Tue 12/3/2024, Normal      QUEtiapine (SEROquel) 100 mg tablet Take 100-200 mg by mouth daily at bedtime, Starting Mon 11/11/2024, Historical Med      venlafaxine (EFFEXOR-XR) 37.5 mg 24 hr capsule Take 1 capsule by mouth in the morning, Starting Mon 11/11/2024, Historical Med           No discharge procedures on file.  ED SEPSIS DOCUMENTATION   Time reflects when diagnosis was documented in both MDM as applicable and the Disposition within this note       Time User Action Codes Description Comment    12/10/2024 12:22 PM Alina Viveros Add [J40] Bronchitis                  Alina Viveros PA-C  12/10/24 1491

## 2024-12-11 LAB
ATRIAL RATE: 94 BPM
P AXIS: 44 DEGREES
PR INTERVAL: 172 MS
QRS AXIS: 39 DEGREES
QRSD INTERVAL: 90 MS
QT INTERVAL: 362 MS
QTC INTERVAL: 452 MS
T WAVE AXIS: 28 DEGREES
VENTRICULAR RATE: 94 BPM

## 2024-12-11 PROCEDURE — 93010 ELECTROCARDIOGRAM REPORT: CPT | Performed by: INTERNAL MEDICINE

## 2025-04-02 ENCOUNTER — TELEPHONE (OUTPATIENT)
Dept: FAMILY MEDICINE CLINIC | Facility: CLINIC | Age: 20
End: 2025-04-02

## 2025-04-03 ENCOUNTER — OFFICE VISIT (OUTPATIENT)
Dept: FAMILY MEDICINE CLINIC | Facility: CLINIC | Age: 20
End: 2025-04-03
Payer: COMMERCIAL

## 2025-04-03 VITALS
HEART RATE: 96 BPM | OXYGEN SATURATION: 97 % | TEMPERATURE: 98 F | DIASTOLIC BLOOD PRESSURE: 80 MMHG | WEIGHT: 293 LBS | BODY MASS INDEX: 53.92 KG/M2 | HEIGHT: 62 IN | SYSTOLIC BLOOD PRESSURE: 118 MMHG

## 2025-04-03 DIAGNOSIS — Z84.89 FAMILY HISTORY OF GENETIC DISEASE: Primary | ICD-10-CM

## 2025-04-03 DIAGNOSIS — J40 BRONCHITIS: ICD-10-CM

## 2025-04-03 PROCEDURE — 99213 OFFICE O/P EST LOW 20 MIN: CPT | Performed by: FAMILY MEDICINE

## 2025-04-03 RX ORDER — ALBUTEROL SULFATE 90 UG/1
2 INHALANT RESPIRATORY (INHALATION) EVERY 6 HOURS PRN
Qty: 18 G | Refills: 0 | Status: SHIPPED | OUTPATIENT
Start: 2025-04-03

## 2025-04-03 NOTE — PROGRESS NOTES
"Name: Josephine Mckay      : 2005      MRN: [id number]  Encounter Provider: Conrad Hernandez DO  Encounter Date: 4/3/2025   Encounter department: [facility]  :  Assessment & Plan  Family history of genetic disease    Orders:    Ambulatory Referral to Genetics; Future  Specifically to comment on the patient's sisters HFE gene mutation and MUTYH mutation.         History of Present Illness   Patient is a pleasant 19-year-old female who presents today to discuss genetic test.  Her sister had genetic testing which revealed heterozygous HFE gene and MUTYH gene abnormalities.  I informed the patient that I am not familiar with these abnormalities and that I would have to refer her to a genetic specialist.  There is no family history of hemochromatosis according to the patient.  There is no history of colon polyposis in the family according to the patient.  The patient states that she will check on this.    The patient's sisters physician advised that family members be tested.  Since I am not familiar with these tests, I will refer to a genetic specialist      Review of Systems   Constitutional:  Negative for chills and fever.   HENT:  Negative for ear pain and sore throat.    Eyes:  Negative for pain and visual disturbance.   Respiratory:  Negative for cough and shortness of breath.    Cardiovascular:  Negative for chest pain and palpitations.   Gastrointestinal:  Negative for abdominal pain and vomiting.   Genitourinary:  Negative for dysuria and hematuria.   Musculoskeletal:  Negative for arthralgias and back pain.   Skin:  Negative for color change and rash.   Neurological:  Negative for seizures and syncope.   Psychiatric/Behavioral: Negative.     All other systems reviewed and are negative.      Objective   /80   Pulse 96   Temp 98 °F (36.7 °C)   Ht 5' 2\" (1.575 m)   Wt (!) 144 kg (317 lb)   SpO2 97%   BMI 57.98 kg/m²      Physical Exam  Vitals and nursing note reviewed. "   Constitutional:       General: She is not in acute distress.     Appearance: She is well-developed.   HENT:      Head: Normocephalic and atraumatic.   Eyes:      Conjunctiva/sclera: Conjunctivae normal.   Cardiovascular:      Rate and Rhythm: Normal rate and regular rhythm.      Heart sounds: No murmur heard.  Pulmonary:      Effort: Pulmonary effort is normal. No respiratory distress.      Breath sounds: Normal breath sounds.   Abdominal:      Palpations: Abdomen is soft.      Tenderness: There is no abdominal tenderness.   Musculoskeletal:         General: No swelling.      Cervical back: Neck supple.   Skin:     General: Skin is warm and dry.      Capillary Refill: Capillary refill takes less than 2 seconds.   Neurological:      General: No focal deficit present.      Mental Status: She is alert and oriented to person, place, and time.   Psychiatric:         Mood and Affect: Mood normal.         Behavior: Behavior normal.

## 2025-04-09 ENCOUNTER — TELEPHONE (OUTPATIENT)
Dept: DENTISTRY | Facility: CLINIC | Age: 20
End: 2025-04-09

## 2025-04-09 NOTE — TELEPHONE ENCOUNTER
PT called to make dental appt.  She has a wisdom tooth that's coming in but partially coming through back molar and causing a lot of pain.  Upper right side.    Pain started a few days ago.    I referred to listing from insurance - which is where she got our info - and she will make calls to see if another provider can see her sooner than can.  Otherwise, I told her that I may call her later today if there's an opening tomorrow and if not, she should call me Monday morning to see if we have any openings.    SB

## 2025-04-10 ENCOUNTER — OFFICE VISIT (OUTPATIENT)
Dept: DENTISTRY | Facility: CLINIC | Age: 20
End: 2025-04-10
Payer: COMMERCIAL

## 2025-04-10 DIAGNOSIS — K02.9 DENTAL CARIES: Primary | ICD-10-CM

## 2025-04-10 PROCEDURE — D0220 INTRAORAL - PERIAPICAL FIRST RADIOGRAPHIC IMAGE: HCPCS | Performed by: STUDENT IN AN ORGANIZED HEALTH CARE EDUCATION/TRAINING PROGRAM

## 2025-04-10 PROCEDURE — D0230 INTRAORAL - PERIAPICAL EACH ADDITIONAL RADIOGRAPHIC IMAGE: HCPCS | Performed by: STUDENT IN AN ORGANIZED HEALTH CARE EDUCATION/TRAINING PROGRAM

## 2025-04-10 PROCEDURE — D0270 BITEWING - SINGLE RADIOGRAPHIC IMAGE: HCPCS | Performed by: STUDENT IN AN ORGANIZED HEALTH CARE EDUCATION/TRAINING PROGRAM

## 2025-04-10 PROCEDURE — D0140 LIMITED ORAL EVALUATION - PROBLEM FOCUSED: HCPCS | Performed by: STUDENT IN AN ORGANIZED HEALTH CARE EDUCATION/TRAINING PROGRAM

## 2025-04-10 RX ORDER — AMOXICILLIN 500 MG/1
500 CAPSULE ORAL EVERY 8 HOURS SCHEDULED
Qty: 30 CAPSULE | Refills: 0 | Status: SHIPPED | OUTPATIENT
Start: 2025-04-10 | End: 2025-04-16 | Stop reason: ALTCHOICE

## 2025-04-10 NOTE — PROGRESS NOTES
Pt presents for limited exam  PMH reviewed, no changes    CC: pain on the upper right, ice pops makes the pain better   Extraoral exam:No extraoral swelling  Intraoral Exam: no intraoral swelling, pain to percussion on #1, 2, 3, 4, 31      Xrays Taken:BW and 2PA #3/#1     Pt Referred to [facility] For ext of #1, 3, 16, 17, 31 and 32      Pt understands that teeth are savable but will require RCT and crown to save; both procedures are not covered by insurance. Pt opted to extract teeth instead.    Explained to pt that there is an infection because the infected teeth are still there and to completely stop the infection from recurring, infected teeth need to be extracted. Pt understands that an antibiotic is only a temporary fix to the infection and will work to lessen it and may alleviate some of the pain felt.     Advised patient to establish herself as a patient in a dental practice due to several other areas of caries noted clinically and on radiographs. Patient advised that she will need a full comprehensive exam as today was just a limited exam.

## 2025-04-16 ENCOUNTER — HOSPITAL ENCOUNTER (EMERGENCY)
Facility: HOSPITAL | Age: 20
Discharge: HOME | End: 2025-04-16
Attending: EMERGENCY MEDICINE
Payer: COMMERCIAL

## 2025-04-16 ENCOUNTER — APPOINTMENT (EMERGENCY)
Dept: RADIOLOGY | Facility: HOSPITAL | Age: 20
End: 2025-04-16
Payer: COMMERCIAL

## 2025-04-16 VITALS
DIASTOLIC BLOOD PRESSURE: 78 MMHG | OXYGEN SATURATION: 98 % | RESPIRATION RATE: 18 BRPM | HEART RATE: 98 BPM | TEMPERATURE: 97.9 F | SYSTOLIC BLOOD PRESSURE: 132 MMHG

## 2025-04-16 DIAGNOSIS — S80.812A ABRASION OF LEFT LOWER LEG, INITIAL ENCOUNTER: ICD-10-CM

## 2025-04-16 DIAGNOSIS — S80.02XA CONTUSION OF LEFT KNEE, INITIAL ENCOUNTER: ICD-10-CM

## 2025-04-16 DIAGNOSIS — S89.92XA INJURY OF LEFT KNEE, INITIAL ENCOUNTER: Primary | ICD-10-CM

## 2025-04-16 PROCEDURE — 99283 EMERGENCY DEPT VISIT LOW MDM: CPT

## 2025-04-16 PROCEDURE — 99284 EMERGENCY DEPT VISIT MOD MDM: CPT | Performed by: PHYSICIAN ASSISTANT

## 2025-04-16 PROCEDURE — 96372 THER/PROPH/DIAG INJ SC/IM: CPT

## 2025-04-16 PROCEDURE — 73564 X-RAY EXAM KNEE 4 OR MORE: CPT

## 2025-04-16 RX ORDER — NAPROXEN 500 MG/1
500 TABLET ORAL 2 TIMES DAILY WITH MEALS
Qty: 30 TABLET | Refills: 0 | Status: SHIPPED | OUTPATIENT
Start: 2025-04-16

## 2025-04-16 RX ORDER — KETOROLAC TROMETHAMINE 30 MG/ML
30 INJECTION, SOLUTION INTRAMUSCULAR; INTRAVENOUS ONCE
Status: COMPLETED | OUTPATIENT
Start: 2025-04-16 | End: 2025-04-16

## 2025-04-16 RX ADMIN — KETOROLAC TROMETHAMINE 30 MG: 30 INJECTION, SOLUTION INTRAMUSCULAR at 20:33

## 2025-04-17 NOTE — DISCHARGE INSTRUCTIONS
Rest, ice, compression, elevation.  Continue use of knee immobilizer until seen by orthopedics in follow up.  Can use crutches, bear weight as tolerated.  Take anti-inflammatory as directed with food.  Can supplement with OTC tylenol.  Follow up with orthopedics for re-evaluation.  Return to ER as needed.

## 2025-04-17 NOTE — ED PROVIDER NOTES
Time reflects when diagnosis was documented in both MDM as applicable and the Disposition within this note       Time User Action Codes Description Comment    4/16/2025  8:24 PM FrankdeliaAlina Add [S89.92XA] Injury of left knee, initial encounter     4/16/2025  8:28 PM Frankdelia Alina Add [S80.02XA] Contusion of left knee, initial encounter     4/16/2025  8:28 PM FrankdeliaAlina Add [S80.812A] Abrasion of left lower leg, initial encounter           ED Disposition       ED Disposition   Discharge    Condition   Stable    Date/Time   Wed Apr 16, 2025  8:24 PM    Comment   Josephine Mckay discharge to home/self care.                   Assessment & Plan       Medical Decision Making  20 yo female presenting for evaluation of left knee injury.  This occurred yesterday, landed on the knee.  Will obtain xray.    Xray obtained without fracture or dislocation.  Has noted contusion and abrasion.  Possible sprain.  Will provide knee brace and crutches.  Weight bearing as tolerated.  Rx NSAID.  Will refer to orthopedics.    Reviewed RICE therapy.    Strict return precautions outlined.  Advised outpatient follow up with orthopedics or return to ER for change in condition as outlined. Pt verbalized understanding and had no further questions.    Please refer to above ER course for further details/discussion.        Problems Addressed:  Abrasion of left lower leg, initial encounter: acute illness or injury     Details: No signs of secondary infection.  Reviewed local wound care.  S/sx infection.  Tetanus is reported to be UTD.  Contusion of left knee, initial encounter: acute illness or injury  Injury of left knee, initial encounter: acute illness or injury    Amount and/or Complexity of Data Reviewed  External Data Reviewed: labs and notes.  Radiology: ordered and independent interpretation performed. Decision-making details documented in ED Course.    Risk  OTC drugs.  Prescription drug management.        ED Course as of  04/16/25 2057 Wed Apr 16, 2025 2010 XR knee 4+ vw left injury  Independently viewed and interpreted by me - no fracture or acute osseous findings; pending official read.       Medications   ketorolac (TORADOL) injection 30 mg (30 mg Intramuscular Given 4/16/25 2033)       ED Risk Strat Scores                    No data recorded        SBIRT 20yo+      Flowsheet Row Most Recent Value   Initial Alcohol Screen: US AUDIT-C     1. How often do you have a drink containing alcohol? 0 Filed at: 04/16/2025 1941   2. How many drinks containing alcohol do you have on a typical day you are drinking?  0 Filed at: 04/16/2025 1941   3b. FEMALE Any Age, or MALE 65+: How often do you have 4 or more drinks on one occassion? 0 Filed at: 04/16/2025 1941   Audit-C Score 0 Filed at: 04/16/2025 1941   NESSA: How many times in the past year have you...    Used an illegal drug or used a prescription medication for non-medical reasons? Never Filed at: 04/16/2025 1941                            History of Present Illness       Chief Complaint   Patient presents with    Knee Injury     Fall on left knee last night after knee gave out. No headstrike       Past Medical History:   Diagnosis Date    ADHD (attention deficit hyperactivity disorder)     Anxiety     Asthma     Bipolar disorder (HCC)     Depression     Environmental allergies     PONV (postoperative nausea and vomiting)     PTSD (post-traumatic stress disorder)       Past Surgical History:   Procedure Laterality Date    MN ARTHRD MIDTARSL/TARSOMETATARSAL MULT/TRANSVRS Left 10/13/2022    Procedure: ARTHRODESIS / FUSION FOOT;  Surgeon: Houston Mercado DPM;  Location: [facility];  Service: Podiatry    MN OPEN TREATMENT TARSOMETATARSAL JOINT DISLOCATION Left 5/6/2022    Procedure: ORIF OF LEFT FOOT UTILIZING PLATES AND SCREW AS NECESSARY;  Surgeon: Houston Mercado DPM;  Location: [facility];  Service: Podiatry    MN REMOVAL IMPLANT DEEP Left 7/28/2022    Procedure:  REMOVAL HARDWARE FOOT;  Surgeon: Houston Mercado DPM;  Location: [facility];  Service: Podiatry      Family History   Problem Relation Age of Onset    Thyroid cancer Sister     Autism Brother     Diabetes type II Maternal Grandmother       Social History     Tobacco Use    Smoking status: Every Day     Current packs/day: 1.00     Types: Cigarettes    Smokeless tobacco: Never    Tobacco comments:     cutting back   Vaping Use    Vaping status: Some Days    Substances: Nicotine, Flavoring   Substance Use Topics    Alcohol use: Yes     Comment: Occasional    Drug use: Yes     Types: Marijuana      E-Cigarette/Vaping    E-Cigarette Use Current Some Day User       E-Cigarette/Vaping Substances    Nicotine Yes     THC No     CBD No     Flavoring Yes     Other No     Unknown No       I have reviewed and agree with the history as documented.     19 year old female presenting for evaluation of left knee injury.  She reports yesterday she took a fall while walking.  She reports she has frequent issue with her knee giving out on her.  She reports this caused her to fall and landed directly on the left knee.  Did not seek evaluation last evening.  Denies hitting head.  No prodromal symptoms such as dizziness.  Denies neck or back pain.  Denies numbness, tingling, weakness.  Pain reported throughout the left knee.  Does not radiate.  She reports it feels swollen.  No reported alleviating factors. Pain worse with movement and weight bearing.  She reports she's injured this knee in the past.  No prior knee surgery.  Has otherwise been in usual state of health.  ROS otherwise negative.  No meds taken today.      History provided by:  Patient and medical records   used: No        Review of Systems   Constitutional: Negative.  Negative for chills, fatigue and fever.   HENT: Negative.     Eyes: Negative.  Negative for visual disturbance.   Respiratory: Negative.  Negative for cough, shortness of breath  and wheezing.    Cardiovascular: Negative.  Negative for chest pain, palpitations and leg swelling.   Gastrointestinal: Negative.  Negative for abdominal pain, diarrhea, nausea and vomiting.   Genitourinary: Negative.  Negative for flank pain.   Musculoskeletal:  Positive for arthralgias and joint swelling. Negative for back pain and neck pain.   Skin: Negative.  Negative for rash.   Neurological: Negative.  Negative for dizziness, light-headedness and headaches.   Psychiatric/Behavioral: Negative.     All other systems reviewed and are negative.          Objective       ED Triage Vitals [04/16/25 1942]   Temperature Pulse Blood Pressure Respirations SpO2 Patient Position - Orthostatic VS   97.9 °F (36.6 °C) 98 132/78 18 98 % --      Temp src Heart Rate Source BP Location FiO2 (%) Pain Score    -- -- -- -- 10 - Worst Possible Pain      Vitals      Date and Time Temp Pulse SpO2 Resp BP Pain Score FACES Pain Rating User   04/16/25 2033 -- -- -- -- -- 10 - Worst Possible Pain -- CD   04/16/25 1942 97.9 °F (36.6 °C) 98 98 % 18 132/78 10 - Worst Possible Pain -- CP            Physical Exam  Vitals and nursing note reviewed.   Constitutional:       General: She is awake. She is not in acute distress.     Appearance: She is well-developed. She is obese. She is not toxic-appearing.   HENT:      Head: Normocephalic and atraumatic.      Right Ear: Hearing and external ear normal.      Left Ear: Hearing and external ear normal.      Mouth/Throat:      Mouth: Mucous membranes are moist.      Pharynx: Oropharynx is clear.   Eyes:      General: No scleral icterus.     Conjunctiva/sclera: Conjunctivae normal.      Pupils: Pupils are equal, round, and reactive to light.   Neck:      Trachea: Trachea and phonation normal. No tracheal deviation.   Cardiovascular:      Rate and Rhythm: Normal rate and regular rhythm.      Pulses: Normal pulses.           Dorsalis pedis pulses are 2+ on the right side and 2+ on the left side.         Posterior tibial pulses are 2+ on the right side and 2+ on the left side.      Heart sounds: Normal heart sounds, S1 normal and S2 normal.   Pulmonary:      Effort: Pulmonary effort is normal. No tachypnea or respiratory distress.      Breath sounds: Normal breath sounds. No wheezing, rhonchi or rales.   Musculoskeletal:      Left knee: Swelling present. No deformity or erythema. Tenderness present. Normal patellar mobility. Normal pulse.      Right lower leg: No edema.      Left lower leg: No edema.      Left ankle: No swelling. No tenderness. Normal range of motion. Normal pulse.      Left foot: Normal.        Legs:       Comments: Small contusion above the knee.  Has an abrasion on the shin just below the knee (no signs of secondary infection).   Skin:     General: Skin is warm and dry.      Capillary Refill: Capillary refill takes less than 2 seconds.      Findings: Abrasion and bruising present. No erythema or rash.   Neurological:      General: No focal deficit present.      Mental Status: She is alert and oriented to person, place, and time.      GCS: GCS eye subscore is 4. GCS verbal subscore is 5. GCS motor subscore is 6.      Cranial Nerves: No cranial nerve deficit.      Sensory: Sensation is intact. No sensory deficit.      Motor: Motor function is intact. No abnormal muscle tone.      Comments: Pt ambulatory to exam room.   Psychiatric:         Mood and Affect: Mood normal.         Speech: Speech normal.         Behavior: Behavior normal. Behavior is cooperative.         Results Reviewed       None            XR knee 4+ vw left injury    (Results Pending)       Procedures    ED Medication and Procedure Management   Prior to Admission Medications   Prescriptions Last Dose Informant Patient Reported? Taking?   ALPRAZolam (XANAX) 0.25 mg tablet  Self Yes No   Sig: Take 0.25 mg by mouth Three times daily as needed   Caplyta 42 MG CAPS capsule   Yes No   Patient not taking: Reported on 4/3/2025   QUEtiapine  (SEROquel) 100 mg tablet   Yes No   Sig: Take 100-200 mg by mouth daily at bedtime   Patient not taking: Reported on 4/3/2025   albuterol (Ventolin HFA) 90 mcg/act inhaler   No No   Sig: Inhale 2 puffs every 6 (six) hours as needed for wheezing   amoxicillin (AMOXIL) 500 mg capsule   No No   Sig: Take 1 capsule (500 mg total) by mouth every 8 (eight) hours for 10 days   methocarbamol (ROBAXIN) 500 mg tablet   No No   Sig: Take 1 tablet (500 mg total) by mouth 3 (three) times a day as needed for muscle spasms   predniSONE 50 mg tablet   No No   Sig: Take 1 tablet (50 mg total) by mouth daily   Patient not taking: Reported on 4/3/2025   venlafaxine (EFFEXOR-XR) 37.5 mg 24 hr capsule   Yes No   Sig: Take 1 capsule by mouth in the morning   Patient not taking: Reported on 4/3/2025      Facility-Administered Medications: None     Discharge Medication List as of 4/16/2025  8:34 PM        START taking these medications    Details   naproxen (Naprosyn) 500 mg tablet Take 1 tablet (500 mg total) by mouth 2 (two) times a day with meals, Starting Wed 4/16/2025, Normal           CONTINUE these medications which have NOT CHANGED    Details   albuterol (Ventolin HFA) 90 mcg/act inhaler Inhale 2 puffs every 6 (six) hours as needed for wheezing, Starting Thu 4/3/2025, Normal      ALPRAZolam (XANAX) 0.25 mg tablet Take 0.25 mg by mouth Three times daily as needed, Historical Med      Caplyta 42 MG CAPS capsule Historical Med      QUEtiapine (SEROquel) 100 mg tablet Take 100-200 mg by mouth daily at bedtime, Starting Mon 11/11/2024, Historical Med      venlafaxine (EFFEXOR-XR) 37.5 mg 24 hr capsule Take 1 capsule by mouth in the morning, Starting Mon 11/11/2024, Historical Med           STOP taking these medications       amoxicillin (AMOXIL) 500 mg capsule Comments:   Reason for Stopping:         methocarbamol (ROBAXIN) 500 mg tablet Comments:   Reason for Stopping:         predniSONE 50 mg tablet Comments:   Reason for Stopping:                ED SEPSIS DOCUMENTATION   Time reflects when diagnosis was documented in both MDM as applicable and the Disposition within this note       Time User Action Codes Description Comment    4/16/2025  8:24 PM Alina Viveros [S89.92XA] Injury of left knee, initial encounter     4/16/2025  8:28 PM Alian Viveros [S80.02XA] Contusion of left knee, initial encounter     4/16/2025  8:28 PM Alina Viveros [S80.812A] Abrasion of left lower leg, initial encounter                  Alina Viveros PA-C  04/16/25 2057

## 2025-05-14 ENCOUNTER — OFFICE VISIT (OUTPATIENT)
Dept: FAMILY MEDICINE CLINIC | Facility: CLINIC | Age: 20
End: 2025-05-14
Payer: COMMERCIAL

## 2025-05-14 VITALS
WEIGHT: 293 LBS | HEIGHT: 62 IN | OXYGEN SATURATION: 99 % | HEART RATE: 105 BPM | DIASTOLIC BLOOD PRESSURE: 80 MMHG | BODY MASS INDEX: 53.92 KG/M2 | SYSTOLIC BLOOD PRESSURE: 126 MMHG | TEMPERATURE: 97.6 F

## 2025-05-14 DIAGNOSIS — R11.0 NAUSEA: ICD-10-CM

## 2025-05-14 DIAGNOSIS — R10.84 ABDOMINAL DISCOMFORT, GENERALIZED: ICD-10-CM

## 2025-05-14 DIAGNOSIS — R10.13 DYSPEPSIA: Primary | ICD-10-CM

## 2025-05-14 PROCEDURE — 99213 OFFICE O/P EST LOW 20 MIN: CPT | Performed by: FAMILY MEDICINE

## 2025-05-14 RX ORDER — ALPRAZOLAM 0.25 MG
0.25 TABLET ORAL 3 TIMES DAILY PRN
Qty: 90 TABLET | Refills: 0 | OUTPATIENT
Start: 2025-05-14

## 2025-05-14 RX ORDER — OMEPRAZOLE 20 MG/1
20 CAPSULE, DELAYED RELEASE ORAL
Qty: 30 CAPSULE | Refills: 1 | Status: SHIPPED | OUTPATIENT
Start: 2025-05-14 | End: 2025-11-10

## 2025-05-14 RX ORDER — ONDANSETRON 4 MG/1
4 TABLET, FILM COATED ORAL EVERY 8 HOURS PRN
Qty: 20 TABLET | Refills: 1 | Status: SHIPPED | OUTPATIENT
Start: 2025-05-14

## 2025-05-14 NOTE — PROGRESS NOTES
Name: Josephine Mckay      : 2005      MRN: [id number]  Encounter Provider: Conrad Hernandez DO  Encounter Date: 2025   Encounter department: [facility]  :  Assessment & Plan  Dyspepsia  We will stop NSAIDs.  Start omeprazole.  Zofran as needed for nausea.  Obtain lab work and urine test.  Refer to GI  Orders:    ondansetron (ZOFRAN) 4 mg tablet; Take 1 tablet (4 mg total) by mouth every 8 (eight) hours as needed for nausea or vomiting    omeprazole (PriLOSEC) 20 mg delayed release capsule; Take 1 capsule (20 mg total) by mouth daily before breakfast    CBC and differential; Future    Comprehensive metabolic panel; Future    Lipase; Future    UA w Reflex to Microscopic w Reflex to Culture; Future    Pregnancy Test (HCG Qualitative); Future    Ambulatory Referral to Gastroenterology; Future    Nausea  Decrease caffeine.  Avoid alcohol.  Avoid carbonated beverages  Orders:    ondansetron (ZOFRAN) 4 mg tablet; Take 1 tablet (4 mg total) by mouth every 8 (eight) hours as needed for nausea or vomiting    omeprazole (PriLOSEC) 20 mg delayed release capsule; Take 1 capsule (20 mg total) by mouth daily before breakfast    CBC and differential; Future    Comprehensive metabolic panel; Future    Lipase; Future    UA w Reflex to Microscopic w Reflex to Culture; Future    Pregnancy Test (HCG Qualitative); Future    Ambulatory Referral to Gastroenterology; Future    Abdominal discomfort, generalized  No acute surgical signs.  ER instructions if worsening.  This is chronic and intermittent.  GI consult.  Will do trial of proton pump inhibitor.  Stopping naproxen.  Orders:    ondansetron (ZOFRAN) 4 mg tablet; Take 1 tablet (4 mg total) by mouth every 8 (eight) hours as needed for nausea or vomiting    omeprazole (PriLOSEC) 20 mg delayed release capsule; Take 1 capsule (20 mg total) by mouth daily before breakfast    CBC and differential; Future    Comprehensive metabolic panel; Future    Lipase; Future     "UA w Reflex to Microscopic w Reflex to Culture; Future    Pregnancy Test (HCG Qualitative); Future    Ambulatory Referral to Gastroenterology; Future           History of Present Illness   Patient is a pleasant 19-year-old female who presents today for follow-up.  The patient has been having chronic intermittent abdominal discomfort since July 2024.  She also has been having episodes where she feels nauseated and has vomiting.  She is able to tolerate oral intake but certain foods make the symptoms worse.  No associated weight loss.  The patient was taking naproxen.      Review of Systems   Constitutional:  Negative for chills and fever.   HENT:  Negative for ear pain and sore throat.    Eyes:  Negative for pain and visual disturbance.   Respiratory:  Negative for cough and shortness of breath.    Cardiovascular:  Negative for chest pain and palpitations.   Gastrointestinal:  Positive for abdominal pain (Generalized), nausea and vomiting.   Genitourinary:  Negative for dysuria and hematuria.   Musculoskeletal:  Negative for arthralgias and back pain.   Skin:  Negative for color change and rash.   Neurological:  Negative for seizures and syncope.   All other systems reviewed and are negative.      Objective   /80   Pulse 105   Temp 97.6 °F (36.4 °C)   Ht 5' 2\" (1.575 m)   Wt (!) 144 kg (317 lb 12.8 oz)   SpO2 99%   BMI 58.13 kg/m²      Physical Exam  Vitals and nursing note reviewed.   Constitutional:       General: She is not in acute distress.     Appearance: She is well-developed. She is not ill-appearing, toxic-appearing or diaphoretic.   HENT:      Head: Normocephalic and atraumatic.      Mouth/Throat:      Mouth: Mucous membranes are moist.     Eyes:      Conjunctiva/sclera: Conjunctivae normal.       Cardiovascular:      Rate and Rhythm: Normal rate and regular rhythm.      Pulses: Normal pulses.      Heart sounds: Normal heart sounds. No murmur heard.  Pulmonary:      Effort: Pulmonary effort is " normal. No respiratory distress.      Breath sounds: Normal breath sounds. No wheezing or rales.   Abdominal:      General: There is no distension.      Palpations: Abdomen is soft. There is no mass.      Tenderness: There is no abdominal tenderness. There is no right CVA tenderness, left CVA tenderness, guarding or rebound.      Hernia: No hernia is present.     Musculoskeletal:         General: No swelling.      Cervical back: Neck supple.     Skin:     General: Skin is warm and dry.      Capillary Refill: Capillary refill takes less than 2 seconds.      Coloration: Skin is not jaundiced.     Neurological:      General: No focal deficit present.      Mental Status: She is alert and oriented to person, place, and time.     Psychiatric:         Mood and Affect: Mood normal.         Behavior: Behavior normal.         Thought Content: Thought content normal.

## 2025-06-02 ENCOUNTER — HOSPITAL ENCOUNTER (EMERGENCY)
Facility: HOSPITAL | Age: 20
Discharge: HOME | End: 2025-06-02
Attending: EMERGENCY MEDICINE
Payer: COMMERCIAL

## 2025-06-02 VITALS
OXYGEN SATURATION: 96 % | DIASTOLIC BLOOD PRESSURE: 65 MMHG | RESPIRATION RATE: 16 BRPM | SYSTOLIC BLOOD PRESSURE: 126 MMHG | HEART RATE: 81 BPM | TEMPERATURE: 98.4 F

## 2025-06-02 DIAGNOSIS — K08.89 DENTALGIA: Primary | ICD-10-CM

## 2025-06-02 DIAGNOSIS — M27.3 DRY SOCKET: ICD-10-CM

## 2025-06-02 PROCEDURE — 99284 EMERGENCY DEPT VISIT MOD MDM: CPT | Performed by: EMERGENCY MEDICINE

## 2025-06-02 PROCEDURE — 99283 EMERGENCY DEPT VISIT LOW MDM: CPT

## 2025-06-02 PROCEDURE — 64400 NJX AA&/STRD TRIGEMINAL NRV: CPT | Performed by: EMERGENCY MEDICINE

## 2025-06-02 RX ORDER — OXYCODONE HYDROCHLORIDE 5 MG/1
5 TABLET ORAL EVERY 12 HOURS PRN
Qty: 5 TABLET | Refills: 0 | Status: SHIPPED | OUTPATIENT
Start: 2025-06-02

## 2025-06-02 RX ORDER — BUPIVACAINE HYDROCHLORIDE 2.5 MG/ML
5 INJECTION, SOLUTION EPIDURAL; INFILTRATION; INTRACAUDAL; PERINEURAL ONCE
Status: COMPLETED | OUTPATIENT
Start: 2025-06-02 | End: 2025-06-02

## 2025-06-02 RX ADMIN — BUPIVACAINE HYDROCHLORIDE 5 ML: 2.5 INJECTION, SOLUTION EPIDURAL; INFILTRATION; INTRACAUDAL; PERINEURAL at 15:51

## 2025-06-03 NOTE — ED PROVIDER NOTES
Time reflects when diagnosis was documented in both MDM as applicable and the Disposition within this note       Time User Action Codes Description Comment    6/2/2025  3:56 PM Krystyna Peterson Add [K08.89] Dentalgia     6/2/2025  4:09 PM Krystyna Peterson Add [M27.3] Dry socket           ED Disposition       ED Disposition   Discharge    Condition   Stable    Date/Time   Mon Jun 2, 2025  3:56 PM    Comment   Josephine Diezymer discharge to home/self care.                   Assessment & Plan       Medical Decision Making  19-year-old female presents for evaluation of a right lower dental pain.  Patient underwent wisdom tooth surgery last week, few days later felt the right lower molar site open as a suture dislodged.  Since then she has had pain.  She is taking course of amoxicillin, advised to continue taking this to prevent infection.  There is concern for dry socket on examination, she should follow-up with her oral surgeon tomorrow morning, will additionally provide her with a prescription for Augmentin if needed.  We discussed doing a dental block which she is agreeable to, will additionally send a few doses of pain medication to the pharmacy for her.    Risk  Prescription drug management.        ED Course as of 06/02/25 2050 Mon Jun 02, 2025   1610 Has one day left of amox, states she will call her oral surgeon tomorrow. Will give paper rx for augmentin if needed.        Medications   bupivacaine (PF) (MARCAINE) 0.25 % injection 5 mL (5 mL Infiltration Given 6/2/25 1551)       ED Risk Strat Scores                    No data recorded        SBIRT 20yo+      Flowsheet Row Most Recent Value   Initial Alcohol Screen: US AUDIT-C     1. How often do you have a drink containing alcohol? 0 Filed at: 06/02/2025 1539   2. How many drinks containing alcohol do you have on a typical day you are drinking?  0 Filed at: 06/02/2025 1539   3a. Male UNDER 65: How often do you have five or more drinks on one occasion? 0 Filed at:  06/02/2025 1536   3b. FEMALE Any Age, or MALE 65+: How often do you have 4 or more drinks on one occassion? 0 Filed at: 06/02/2025 1539   Audit-C Score 0 Filed at: 06/02/2025 1539   NESSA: How many times in the past year have you...    Used an illegal drug or used a prescription medication for non-medical reasons? Never Filed at: 06/02/2025 1534                            History of Present Illness       Chief Complaint   Patient presents with    Dental Pain     Right lower wisdom tooth taken out Tuesday, stitch came out Friday and has had pain since. Ibuprofen taken at 0900 this morning        Past Medical History[1]   Past Surgical History[2]   Family History[3]   Social History[4]   E-Cigarette/Vaping    E-Cigarette Use Current Some Day User       E-Cigarette/Vaping Substances    Nicotine Yes     THC No     CBD No     Flavoring Yes     Other No     Unknown No       I have reviewed and agree with the history as documented.     19-year-old female presents for evaluation of dental pain.  She underwent wisdom tooth removal last Tuesday on 5/27/2025, on Friday she states she was yelling at her brother when she felt the sutures pop on her right lower surgical site.  Since then she has had pain despite taking pain medications as well as her prescribed antibiotics.  She does not believe she has felt any drainage from the tooth, no significant swelling, no fevers.        Review of Systems   Constitutional:  Negative for activity change, appetite change and fever.   HENT:  Positive for dental problem. Negative for facial swelling.    All other systems reviewed and are negative.          Objective       ED Triage Vitals [06/02/25 9230]   Temperature Pulse Blood Pressure Respirations SpO2 Patient Position - Orthostatic VS   98.4 °F (36.9 °C) 83 125/84 16 96 % Sitting      Temp Source Heart Rate Source BP Location FiO2 (%) Pain Score    Temporal Monitor Right arm -- 10 - Worst Possible Pain      Vitals      Date and Time Temp  Pulse SpO2 Resp BP Pain Score FACES Pain Rating User   06/02/25 1600 -- 81 96 % 16 126/65 -- -- CLS   06/02/25 1539 98.4 °F (36.9 °C) 83 96 % 16 125/84 10 - Worst Possible Pain -- CLS            Physical Exam  Vitals reviewed.   Constitutional:       General: She is in acute distress (Appears uncomfortable with pain).      Appearance: Normal appearance. She is not ill-appearing, toxic-appearing or diaphoretic.   HENT:      Head: Normocephalic and atraumatic.      Right Ear: External ear normal.      Left Ear: External ear normal.      Nose: Nose normal.      Mouth/Throat:      Mouth: Mucous membranes are moist.      Comments: No facial swelling or trismus, soft sublingual submandibular areas, other surgical sites appear to be intact with sutures in place, the right lower wisdom tooth site appears open with grey clot vs tissue    Eyes:      General: No scleral icterus.        Right eye: No discharge.         Left eye: No discharge.       Cardiovascular:      Rate and Rhythm: Normal rate.   Pulmonary:      Effort: Pulmonary effort is normal. No respiratory distress.     Musculoskeletal:         General: No deformity or signs of injury.     Skin:     General: Skin is warm.      Coloration: Skin is not jaundiced or pale.     Neurological:      General: No focal deficit present.      Mental Status: She is alert.         Results Reviewed       None            No orders to display       Nerve block    Date/Time: 6/2/2025 4:00 PM    Performed by: Krystyna Peterson DO  Authorized by: Krystyna Peterson DO    Patient location:  ED    [place] Protocol:  procedure performed by consultantConsent: Verbal consent obtained  Risks and benefits: risks, benefits and alternatives were discussed  Consent given by: patient  Required items: required blood products, implants, devices, and special equipment available  Patient identity confirmed: verbally with patient    Indications:     Indications:  Pain relief  Location:     Nerve  block body site: mouth.    Nerve type:  Peripheral    Laterality:  Right  Skin anesthesia (see MAR for exact dosages):     Skin anesthesia method:  None  Procedure details (see MAR for exact dosages):     Block needle gauge:  25 G    Anesthetic injected:  Bupivacaine 0.25% w/o epi    Steroid injected:  None    Additive injected:  None    Injection procedure:  Anatomic landmarks identified, anatomic landmarks palpated, incremental injection, introduced needle and negative aspiration for blood  Post-procedure details:     Dressing:  None    Outcome:  Pain relieved    Patient tolerance of procedure:  Tolerated well, no immediate complications      ED Medication and Procedure Management   Prior to Admission Medications   Prescriptions Last Dose Informant Patient Reported? Taking?   ALPRAZolam (XANAX) 0.25 mg tablet Not Taking Self Yes No   Sig: Take 0.25 mg by mouth as needed in the morning and 0.25 mg as needed at noon and 0.25 mg as needed in the evening.   Patient not taking: Reported on 6/2/2025   albuterol (Ventolin HFA) 90 mcg/act inhaler Not Taking  No No   Sig: Inhale 2 puffs every 6 (six) hours as needed for wheezing   Patient not taking: Reported on 6/2/2025   omeprazole (PriLOSEC) 20 mg delayed release capsule Not Taking  No No   Sig: Take 1 capsule (20 mg total) by mouth daily before breakfast   Patient not taking: Reported on 6/2/2025   ondansetron (ZOFRAN) 4 mg tablet Not Taking  No No   Sig: Take 1 tablet (4 mg total) by mouth every 8 (eight) hours as needed for nausea or vomiting   Patient not taking: Reported on 6/2/2025      Facility-Administered Medications: None     Discharge Medication List as of 6/2/2025  4:10 PM        START taking these medications    Details   amoxicillin-clavulanate (AUGMENTIN) 875-125 mg per tablet Take 1 tablet by mouth every 12 (twelve) hours for 5 days, Starting Mon 6/2/2025, Until Sat 6/7/2025, Print      oxyCODONE (ROXICODONE) 5 immediate release tablet Take 1 tablet (5  mg total) by mouth every 12 (twelve) hours as needed for severe pain for up to 5 doses Max Daily Amount: 10 mg, Starting Mon 6/2/2025, Normal           CONTINUE these medications which have NOT CHANGED    Details   albuterol (Ventolin HFA) 90 mcg/act inhaler Inhale 2 puffs every 6 (six) hours as needed for wheezing, Starting Thu 4/3/2025, Normal      ALPRAZolam (XANAX) 0.25 mg tablet Take 0.25 mg by mouth as needed in the morning and 0.25 mg as needed at noon and 0.25 mg as needed in the evening., Historical Med      omeprazole (PriLOSEC) 20 mg delayed release capsule Take 1 capsule (20 mg total) by mouth daily before breakfast, Starting Wed 5/14/2025, Until Mon 11/10/2025, Normal      ondansetron (ZOFRAN) 4 mg tablet Take 1 tablet (4 mg total) by mouth every 8 (eight) hours as needed for nausea or vomiting, Starting Wed 5/14/2025, Normal           No discharge procedures on file.  ED SEPSIS DOCUMENTATION   Time reflects when diagnosis was documented in both MDM as applicable and the Disposition within this note       Time User Action Codes Description Comment    6/2/2025  3:56 PM Krystyna Peterson Add [K08.89] Dentalgia     6/2/2025  4:09 PM Krystyna Peterson Add [M27.3] Dry socket                    [1]   Past Medical History:  Diagnosis Date    ADHD (attention deficit hyperactivity disorder)     Anxiety     Asthma     Bipolar disorder (HCC)     Depression     Environmental allergies     PONV (postoperative nausea and vomiting)     PTSD (post-traumatic stress disorder)    [2]   Past Surgical History:  Procedure Laterality Date    TX ARTHRD MIDTARSL/TARSOMETATARSAL MULT/TRANSVRS Left 10/13/2022    Procedure: ARTHRODESIS / FUSION FOOT;  Surgeon: Houston Mercado DPM;  Location: [facility];  Service: Podiatry    TX OPEN TREATMENT TARSOMETATARSAL JOINT DISLOCATION Left 5/6/2022    Procedure: ORIF OF LEFT FOOT UTILIZING PLATES AND SCREW AS NECESSARY;  Surgeon: Houston Mercado DPM;  Location: [facility]  OR;  Service: Podiatry    MS REMOVAL IMPLANT DEEP Left 7/28/2022    Procedure: REMOVAL HARDWARE FOOT;  Surgeon: Houston Mercado DPM;  Location: [facility];  Service: Podiatry   [3]   Family History  Problem Relation Name Age of Onset    Thyroid cancer Sister      Autism Brother      Diabetes type II Maternal Grandmother     [4]   Social History  Tobacco Use    Smoking status: Every Day     Current packs/day: 1.00     Types: Cigarettes    Smokeless tobacco: Never    Tobacco comments:     cutting back   Vaping Use    Vaping status: Some Days    Substances: Nicotine, Flavoring   Substance Use Topics    Alcohol use: Yes     Comment: Occasional    Drug use: Yes     Types: Marijuana        Krystyna Peterson DO  06/02/25 2050

## 2025-06-06 DIAGNOSIS — R10.13 DYSPEPSIA: ICD-10-CM

## 2025-06-06 DIAGNOSIS — R10.84 ABDOMINAL DISCOMFORT, GENERALIZED: ICD-10-CM

## 2025-06-06 DIAGNOSIS — R11.0 NAUSEA: ICD-10-CM

## 2025-06-06 RX ORDER — ONDANSETRON 4 MG/1
4 TABLET, FILM COATED ORAL EVERY 8 HOURS PRN
Qty: 20 TABLET | Refills: 1 | Status: SHIPPED | OUTPATIENT
Start: 2025-06-06

## 2025-06-06 NOTE — TELEPHONE ENCOUNTER
Pt called in returning missed call from Xiomara.    Pt reports and confirms she is still taking: omeprazole (PriLOSEC) 20 mg delayed release capsule - to please refill it.     Pt is also requesting refill of:  Requested Prescriptions     Pending Prescriptions Disp Refills    ondansetron (ZOFRAN) 4 mg tablet 20 tablet 1     Sig: Take 1 tablet (4 mg total) by mouth every 8 (eight) hours as needed for nausea or vomiting     Lastly, pt states the following medications were sent to the wrong pharmacy, so she never got the chance to take them:    albuterol (Ventolin HFA) 90 mcg/act inhaler   ALPRAZolam (XANAX) 0.25 mg tablet     For any further information or questions, please contact pt. Thank you!    Thomas: [phone]

## 2025-06-06 NOTE — TELEPHONE ENCOUNTER
Received fax requesting omeprazole be a #90 day supply. Records show from ER visit 6-2-25 that patient is not taking the medication. LVM for patient to call back if she is taking it and then we will order #90 day supply to [facility].

## 2025-06-23 ENCOUNTER — TELEPHONE (OUTPATIENT)
Dept: FAMILY MEDICINE CLINIC | Facility: CLINIC | Age: 20
End: 2025-06-23

## 2025-06-23 NOTE — TELEPHONE ENCOUNTER
Reason for call:   [x] Refill   [] Prior Auth  [] Other:     Office:   [x] PCP/Provider -Dr Hernandez    [] Specialty/Provider -     Medication: methocarbamol     Dose/Frequency: 500 mg take 1 tablet 3 times a day as needed     Quantity: 90    Pharmacy: [facility] in [facility]   Does the patient have enough for 3 days?   [] Yes   [x] No - Send as HP to POD- she's out     Mail Away Pharmacy   Does the patient have enough for 10 days?   [] Yes   [] No - Send as HP to POD

## 2025-06-24 DIAGNOSIS — G89.29 CHRONIC BILATERAL LOW BACK PAIN WITHOUT SCIATICA: Primary | ICD-10-CM

## 2025-06-24 DIAGNOSIS — M54.50 CHRONIC BILATERAL LOW BACK PAIN WITHOUT SCIATICA: Primary | ICD-10-CM

## 2025-06-24 RX ORDER — METHOCARBAMOL 500 MG/1
500 TABLET, FILM COATED ORAL 3 TIMES DAILY PRN
Qty: 90 TABLET | Refills: 0 | Status: SHIPPED | OUTPATIENT
Start: 2025-06-24

## 2025-07-07 ENCOUNTER — APPOINTMENT (OUTPATIENT)
Dept: LAB | Facility: HOSPITAL | Age: 20
End: 2025-07-07
Payer: COMMERCIAL

## 2025-07-07 ENCOUNTER — APPOINTMENT (EMERGENCY)
Dept: RADIOLOGY | Facility: HOSPITAL | Age: 20
End: 2025-07-07
Payer: COMMERCIAL

## 2025-07-07 ENCOUNTER — HOSPITAL ENCOUNTER (OUTPATIENT)
Dept: ULTRASOUND IMAGING | Facility: HOSPITAL | Age: 20
Discharge: HOME | End: 2025-07-07
Attending: NURSE PRACTITIONER
Payer: COMMERCIAL

## 2025-07-07 ENCOUNTER — HOSPITAL ENCOUNTER (EMERGENCY)
Facility: HOSPITAL | Age: 20
Discharge: HOME | End: 2025-07-07
Attending: EMERGENCY MEDICINE | Admitting: EMERGENCY MEDICINE
Payer: COMMERCIAL

## 2025-07-07 ENCOUNTER — OFFICE VISIT (OUTPATIENT)
Dept: GASTROENTEROLOGY | Facility: CLINIC | Age: 20
End: 2025-07-07
Attending: FAMILY MEDICINE
Payer: COMMERCIAL

## 2025-07-07 VITALS
HEART RATE: 90 BPM | OXYGEN SATURATION: 98 % | SYSTOLIC BLOOD PRESSURE: 119 MMHG | TEMPERATURE: 97.7 F | RESPIRATION RATE: 18 BRPM | DIASTOLIC BLOOD PRESSURE: 74 MMHG

## 2025-07-07 VITALS
WEIGHT: 293 LBS | SYSTOLIC BLOOD PRESSURE: 112 MMHG | TEMPERATURE: 97.6 F | BODY MASS INDEX: 53.92 KG/M2 | OXYGEN SATURATION: 98 % | DIASTOLIC BLOOD PRESSURE: 63 MMHG | HEART RATE: 92 BPM | HEIGHT: 62 IN

## 2025-07-07 DIAGNOSIS — R10.13 DYSPEPSIA: ICD-10-CM

## 2025-07-07 DIAGNOSIS — K58.0 IRRITABLE BOWEL SYNDROME WITH DIARRHEA: ICD-10-CM

## 2025-07-07 DIAGNOSIS — R10.84 ABDOMINAL DISCOMFORT, GENERALIZED: ICD-10-CM

## 2025-07-07 DIAGNOSIS — K76.0 STEATOSIS OF LIVER: ICD-10-CM

## 2025-07-07 DIAGNOSIS — S63.601A SPRAIN OF RIGHT THUMB, INITIAL ENCOUNTER: Primary | ICD-10-CM

## 2025-07-07 DIAGNOSIS — R11.2 NAUSEA AND VOMITING, UNSPECIFIED VOMITING TYPE: Primary | ICD-10-CM

## 2025-07-07 DIAGNOSIS — R19.7 DIARRHEA OF PRESUMED INFECTIOUS ORIGIN: ICD-10-CM

## 2025-07-07 DIAGNOSIS — K62.5 BRBPR (BRIGHT RED BLOOD PER RECTUM): ICD-10-CM

## 2025-07-07 DIAGNOSIS — R11.0 NAUSEA: ICD-10-CM

## 2025-07-07 LAB
ALBUMIN SERPL BCG-MCNC: 4.2 G/DL (ref 3.5–5)
ALP SERPL-CCNC: 58 U/L (ref 34–104)
ALT SERPL W P-5'-P-CCNC: 14 U/L (ref 7–52)
ANION GAP SERPL CALCULATED.3IONS-SCNC: 9 MMOL/L (ref 4–13)
AST SERPL W P-5'-P-CCNC: 13 U/L (ref 13–39)
BACTERIA UR QL AUTO: ABNORMAL /HPF
BASOPHILS # BLD AUTO: 0.04 THOUSANDS/ÂΜL (ref 0–0.1)
BASOPHILS NFR BLD AUTO: 0 % (ref 0–1)
BILIRUB DIRECT SERPL-MCNC: 0.05 MG/DL (ref 0–0.2)
BILIRUB SERPL-MCNC: 0.45 MG/DL (ref 0.2–1)
BILIRUB UR QL STRIP: NEGATIVE
BUN SERPL-MCNC: 13 MG/DL (ref 5–25)
CALCIUM SERPL-MCNC: 9.3 MG/DL (ref 8.4–10.2)
CHLORIDE SERPL-SCNC: 104 MMOL/L (ref 96–108)
CLARITY UR: ABNORMAL
CO2 SERPL-SCNC: 24 MMOL/L (ref 21–32)
COLOR UR: YELLOW
CREAT SERPL-MCNC: 0.66 MG/DL (ref 0.6–1.3)
EOSINOPHIL # BLD AUTO: 0.02 THOUSAND/ÂΜL (ref 0–0.61)
EOSINOPHIL NFR BLD AUTO: 0 % (ref 0–6)
ERYTHROCYTE [DISTWIDTH] IN BLOOD BY AUTOMATED COUNT: 13 % (ref 11.6–15.1)
GFR SERPL CREATININE-BSD FRML MDRD: 126 ML/MIN/1.73SQ M
GLUCOSE P FAST SERPL-MCNC: 80 MG/DL (ref 65–99)
GLUCOSE UR STRIP-MCNC: NEGATIVE MG/DL
HCG SERPL QL: NEGATIVE
HCT VFR BLD AUTO: 40.9 % (ref 34.8–46.1)
HGB BLD-MCNC: 13.5 G/DL (ref 11.5–15.4)
HGB UR QL STRIP.AUTO: NEGATIVE
IGA SERPL-MCNC: 455 MG/DL (ref 66–433)
IMM GRANULOCYTES # BLD AUTO: 0.03 THOUSAND/UL (ref 0–0.2)
IMM GRANULOCYTES NFR BLD AUTO: 0 % (ref 0–2)
INR PPP: 0.9 (ref 0.85–1.19)
KETONES UR STRIP-MCNC: NEGATIVE MG/DL
LEUKOCYTE ESTERASE UR QL STRIP: ABNORMAL
LIPASE SERPL-CCNC: <6 U/L (ref 11–82)
LYMPHOCYTES # BLD AUTO: 1.79 THOUSANDS/ÂΜL (ref 0.6–4.47)
LYMPHOCYTES NFR BLD AUTO: 19 % (ref 14–44)
MCH RBC QN AUTO: 28.5 PG (ref 26.8–34.3)
MCHC RBC AUTO-ENTMCNC: 33 G/DL (ref 31.4–37.4)
MCV RBC AUTO: 87 FL (ref 82–98)
MONOCYTES # BLD AUTO: 0.51 THOUSAND/ÂΜL (ref 0.17–1.22)
MONOCYTES NFR BLD AUTO: 5 % (ref 4–12)
NEUTROPHILS # BLD AUTO: 7.02 THOUSANDS/ÂΜL (ref 1.85–7.62)
NEUTS SEG NFR BLD AUTO: 76 % (ref 43–75)
NITRITE UR QL STRIP: NEGATIVE
NON-SQ EPI CELLS URNS QL MICRO: ABNORMAL /HPF
NRBC BLD AUTO-RTO: 0 /100 WBCS
PH UR STRIP.AUTO: 6 [PH]
PLATELET # BLD AUTO: 325 THOUSANDS/UL (ref 149–390)
PMV BLD AUTO: 9.8 FL (ref 8.9–12.7)
POTASSIUM SERPL-SCNC: 3.8 MMOL/L (ref 3.5–5.3)
PROT SERPL-MCNC: 8.1 G/DL (ref 6.4–8.4)
PROT UR STRIP-MCNC: NEGATIVE MG/DL
PROTHROMBIN TIME: 12.7 SECONDS (ref 12.3–15)
RBC # BLD AUTO: 4.73 MILLION/UL (ref 3.81–5.12)
RBC #/AREA URNS AUTO: ABNORMAL /HPF
SODIUM SERPL-SCNC: 137 MMOL/L (ref 135–147)
SP GR UR STRIP.AUTO: 1.02 (ref 1–1.03)
UROBILINOGEN UR STRIP-ACNC: <2 MG/DL
WBC # BLD AUTO: 9.41 THOUSAND/UL (ref 4.31–10.16)
WBC #/AREA URNS AUTO: ABNORMAL /HPF

## 2025-07-07 PROCEDURE — 99204 OFFICE O/P NEW MOD 45 MIN: CPT | Performed by: NURSE PRACTITIONER

## 2025-07-07 PROCEDURE — 84703 CHORIONIC GONADOTROPIN ASSAY: CPT

## 2025-07-07 PROCEDURE — 99284 EMERGENCY DEPT VISIT MOD MDM: CPT | Performed by: EMERGENCY MEDICINE

## 2025-07-07 PROCEDURE — 81001 URINALYSIS AUTO W/SCOPE: CPT

## 2025-07-07 PROCEDURE — 36415 COLL VENOUS BLD VENIPUNCTURE: CPT

## 2025-07-07 PROCEDURE — 76705 ECHO EXAM OF ABDOMEN: CPT

## 2025-07-07 PROCEDURE — 83690 ASSAY OF LIPASE: CPT

## 2025-07-07 PROCEDURE — 82784 ASSAY IGA/IGD/IGG/IGM EACH: CPT

## 2025-07-07 PROCEDURE — 85610 PROTHROMBIN TIME: CPT

## 2025-07-07 PROCEDURE — 85025 COMPLETE CBC W/AUTO DIFF WBC: CPT

## 2025-07-07 PROCEDURE — 80053 COMPREHEN METABOLIC PANEL: CPT

## 2025-07-07 PROCEDURE — 82248 BILIRUBIN DIRECT: CPT

## 2025-07-07 PROCEDURE — 99283 EMERGENCY DEPT VISIT LOW MDM: CPT

## 2025-07-07 PROCEDURE — 73140 X-RAY EXAM OF FINGER(S): CPT

## 2025-07-07 RX ORDER — IBUPROFEN 600 MG/1
600 TABLET, FILM COATED ORAL EVERY 6 HOURS PRN
Qty: 30 TABLET | Refills: 0 | Status: SHIPPED | OUTPATIENT
Start: 2025-07-07 | End: 2025-07-15

## 2025-07-07 RX ORDER — SODIUM CHLORIDE, SODIUM LACTATE, POTASSIUM CHLORIDE, CALCIUM CHLORIDE 600; 310; 30; 20 MG/100ML; MG/100ML; MG/100ML; MG/100ML
125 INJECTION, SOLUTION INTRAVENOUS CONTINUOUS
OUTPATIENT
Start: 2025-07-07

## 2025-07-07 RX ORDER — ONDANSETRON 4 MG/1
4 TABLET, FILM COATED ORAL EVERY 8 HOURS PRN
Qty: 45 TABLET | Refills: 3 | Status: SHIPPED | OUTPATIENT
Start: 2025-07-07 | End: 2025-07-07

## 2025-07-07 RX ORDER — ONDANSETRON 4 MG/1
4 TABLET, FILM COATED ORAL EVERY 8 HOURS PRN
Qty: 45 TABLET | Refills: 3 | Status: SHIPPED | OUTPATIENT
Start: 2025-07-07

## 2025-07-07 RX ORDER — POLYETHYLENE GLYCOL 3350, SODIUM SULFATE ANHYDROUS, SODIUM BICARBONATE, SODIUM CHLORIDE, POTASSIUM CHLORIDE 236; 22.74; 6.74; 5.86; 2.97 G/4L; G/4L; G/4L; G/4L; G/4L
4000 POWDER, FOR SOLUTION ORAL ONCE
Qty: 4000 ML | Refills: 0 | Status: SHIPPED | OUTPATIENT
Start: 2025-07-07 | End: 2025-07-07

## 2025-07-07 RX ORDER — QUETIAPINE FUMARATE 100 MG/1
100-200 TABLET, FILM COATED ORAL
COMMUNITY
Start: 2025-06-30

## 2025-07-07 RX ORDER — ZIPRASIDONE HYDROCHLORIDE 20 MG/1
CAPSULE ORAL
COMMUNITY
Start: 2025-07-02

## 2025-07-07 NOTE — ASSESSMENT & PLAN NOTE
While the patient and her mother were in the office today, I discussed with the patient and with her bright red blood per rectum and irritable bowel syndrome symptoms, I do feel would be beneficial to proceed with a colonoscopy at the same time as the EGD to evaluate for any other underlying etiology, including IBD as a source of her symptoms.  The patient was agreeable and verbalized an understanding.    I obtained informed verbal consent from the patient. The risks/benefits/alternatives of the Colonoscopy procedure were discussed with the patient. Risks included, but not limited to, infection, bleeding, perforation, injury to organs in the abdomen, missed lesion, and incomplete procedure were discussed. The patient gave verbal understanding and is agreeable to proceed. Bowel prep instructions were reviewed and given as ordered. Patient's questions were answered to the best of my ability and until they verbalized an understanding.      Encouraged the patient to continue to try to drink at least 32 to 64 ounces of water daily.  Orders:  •  Colonoscopy; Future  •  polyethylene glycol (Golytely) 4000 mL solution; Take 4,000 mL by mouth once for 1 dose Take 4000 mL by mouth once for 1 dose. Use as directed

## 2025-07-07 NOTE — ED PROVIDER NOTES
Time reflects when diagnosis was documented in both MDM as applicable and the Disposition within this note       Time User Action Codes Description Comment    7/7/2025  1:02 PM Andrzej Corrales Add [S63.601A] Sprain of right thumb, initial encounter           ED Disposition       ED Disposition   Discharge    Condition   Stable    Date/Time   Mon Jul 7, 2025  1:36 PM    Comment   [place] discharge to home/self care.                   Assessment & Plan       Medical Decision Making  19-year-old female present with right thumb injury.  Differential diagnoses include sprain, strain, fracture, dislocation, subluxation.  No evidence of neurovascular injury.  Will obtain screening x-ray.  No FOOSH injury.  No wrist tenderness.    X-ray to me appears to reveal no acute osseous abnormality.  The artificial intelligence software suggest possible fracture but I do not think this represents an acute fracture.  Regardless we will manage with a thumb spica splint immobilization while symptoms persist outpatient follow-up with orthopedics.    Problems Addressed:  Sprain of right thumb, initial encounter: acute illness or injury    Amount and/or Complexity of Data Reviewed  Radiology: ordered and independent interpretation performed.    Risk  Prescription drug management.             Medications - No data to display    ED Risk Strat Scores                    No data recorded        SBIRT 22yo+      Flowsheet Row Most Recent Value   Initial Alcohol Screen: US AUDIT-C     1. How often do you have a drink containing alcohol? 0 Filed at: 07/07/2025 1248   2. How many drinks containing alcohol do you have on a typical day you are drinking?  0 Filed at: 07/07/2025 1248   3a. Male UNDER 65: How often do you have five or more drinks on one occasion? 0 Filed at: 07/07/2025 1248   3b. FEMALE Any Age, or MALE 65+: How often do you have 4 or more drinks on one occassion? 0 Filed at: 07/07/2025 1248   Audit-C Score 0 Filed at:  07/07/2025 1248   NESSA: How many times in the past year have you...    Used an illegal drug or used a prescription medication for non-medical reasons? Never Filed at: 07/07/2025 1219                            History of Present Illness       Chief Complaint   Patient presents with    Thumb Injury     Patient reports bending her thumb backwards on Saturday, reports pain and unable to move it       Past Medical History[1]   Past Surgical History[2]   Family History[3]   Social History[4]   E-Cigarette/Vaping    E-Cigarette Use Current Some Day User       E-Cigarette/Vaping Substances    Nicotine Yes     THC No     CBD No     Flavoring Yes     Other No     Unknown No       I have reviewed and agree with the history as documented.     19-year-old female without pertinent past medical history presents to the ER for evaluation of right thumb injury.  Patient reports that she was getting out of a trampoline and bracing herself against a tree when she caught her foot causing her to fall which she states resulted in hyperextension injury at the right thumb at the MCP joint.  Patient reports pain in that region which radiates to the first metacarpal.  It is associate with soft tissue swelling and bruising.  No numbness tingling weakness.  Pain is worse with movement.  Improved with rest.  Thinks it may be sprain but states mother thought it could have been dislocated and suggested patient go to the ER for x-ray.          Review of Systems   Constitutional:  Negative for chills and fever.   HENT:  Negative for ear pain and sore throat.    Eyes:  Negative for pain and visual disturbance.   Respiratory:  Negative for cough and shortness of breath.    Cardiovascular:  Negative for chest pain and palpitations.   Gastrointestinal:  Negative for abdominal pain and vomiting.   Genitourinary:  Negative for dysuria and hematuria.   Musculoskeletal:  Negative for arthralgias and back pain.        Right thumb pain, swelling, injury    Skin:  Negative for color change and rash.   Neurological:  Negative for seizures and syncope.   All other systems reviewed and are negative.          Objective       ED Triage Vitals [07/07/25 1249]   Temperature Pulse Blood Pressure Respirations SpO2 Patient Position - Orthostatic VS   97.7 °F (36.5 °C) 93 137/94 20 98 % Sitting      Temp Source Heart Rate Source BP Location FiO2 (%) Pain Score    Temporal Monitor Left arm -- 9      Vitals      Date and Time Temp Pulse SpO2 Resp BP Pain Score FACES Pain Rating User   07/07/25 1249 97.7 °F (36.5 °C) 93 98 % 20 137/94 9 -- SK            Physical Exam  Vitals and nursing note reviewed.   Constitutional:       General: She is not in acute distress.     Appearance: She is well-developed.   HENT:      Head: Normocephalic and atraumatic.     Eyes:      Conjunctiva/sclera: Conjunctivae normal.       Cardiovascular:      Rate and Rhythm: Normal rate and regular rhythm.      Pulses:           Radial pulses are 2+ on the right side.      Heart sounds: No murmur heard.  Pulmonary:      Effort: Pulmonary effort is normal. No respiratory distress.      Breath sounds: Normal breath sounds.   Abdominal:      Palpations: Abdomen is soft.      Tenderness: There is no abdominal tenderness.     Musculoskeletal:         General: No swelling.      Cervical back: Neck supple.      Comments: There is mild soft tissue swelling at the right first MCP joint, right first metacarpal region.  There is no focal bony tenderness.  Small amount of ecchymosis.  No evidence of tenderness at the proximal or distal first phalanx.  No anatomic snuffbox tenderness no radial joint line tenderness.  Pain is reproduced with flexion extension but range of motion/flexion extension mechanism at IP and MCP joint is preserved.     Skin:     General: Skin is warm and dry.      Capillary Refill: Capillary refill takes less than 2 seconds.     Neurological:      Mental Status: She is alert.     Psychiatric:          Mood and Affect: Mood normal.         Results Reviewed       None            XR thumb first digit-min 2 views RIGHT   ED Interpretation by Andrzej Corrales DO (07/07 1335)   X-ray of the right thumb interpreted by myself pending official radiology report.  No acute osseous abnormality seen by myself.          Procedures    ED Medication and Procedure Management   Prior to Admission Medications   Prescriptions Last Dose Informant Patient Reported? Taking?   ALPRAZolam (XANAX) 0.25 mg tablet  Self Yes No   Sig: Take 0.25 mg by mouth as needed in the morning and 0.25 mg as needed at noon and 0.25 mg as needed in the evening.   QUEtiapine (SEROquel) 100 mg tablet   Yes No   Sig: Take 100-200 mg by mouth daily at bedtime   albuterol (Ventolin HFA) 90 mcg/act inhaler  Self No No   Sig: Inhale 2 puffs every 6 (six) hours as needed for wheezing   Patient not taking: Reported on 6/2/2025   methocarbamol (ROBAXIN) 500 mg tablet  Self No No   Sig: Take 1 tablet (500 mg total) by mouth 3 (three) times a day as needed for muscle spasms   ondansetron (ZOFRAN) 4 mg tablet   No No   Sig: Take 1 tablet (4 mg total) by mouth every 8 (eight) hours as needed for nausea or vomiting   oxyCODONE (ROXICODONE) 5 immediate release tablet  Self No No   Sig: Take 1 tablet (5 mg total) by mouth every 12 (twelve) hours as needed for severe pain for up to 5 doses Max Daily Amount: 10 mg   polyethylene glycol (Golytely) 4000 mL solution   No No   Sig: Take 4,000 mL by mouth once for 1 dose Take 4000 mL by mouth once for 1 dose. Use as directed   ziprasidone (GEODON) 20 mg capsule   Yes No   Sig: take 1 capsule by mouth  IN THE MORNING AND ONE CAP AFTER DINNER      Facility-Administered Medications: None     Patient's Medications   Discharge Prescriptions    IBUPROFEN (MOTRIN) 600 MG TABLET    Take 1 tablet (600 mg total) by mouth every 6 (six) hours as needed for mild pain       Start Date: 7/7/2025  End Date: --       Order Dose: 600 mg        Quantity: 30 tablet    Refills: 0     No discharge procedures on file.  ED SEPSIS DOCUMENTATION   Time reflects when diagnosis was documented in both MDM as applicable and the Disposition within this note       Time User Action Codes Description Comment    7/7/2025  1:02 PM Andrzej Corrales Add [S63.601A] Sprain of right thumb, initial encounter                      [1]   Past Medical History:  Diagnosis Date    ADHD (attention deficit hyperactivity disorder)     Anxiety     Asthma     Bipolar disorder (HCC)     Depression     Environmental allergies     PONV (postoperative nausea and vomiting)     PTSD (post-traumatic stress disorder)    [2]   Past Surgical History:  Procedure Laterality Date    FL ARTHRD MIDTARSL/TARSOMETATARSAL MULT/TRANSVRS Left 10/13/2022    Procedure: ARTHRODESIS / FUSION FOOT;  Surgeon: Houston Mercado DPM;  Location: [facility];  Service: Podiatry    FL OPEN TREATMENT TARSOMETATARSAL JOINT DISLOCATION Left 5/6/2022    Procedure: ORIF OF LEFT FOOT UTILIZING PLATES AND SCREW AS NECESSARY;  Surgeon: Houston Mercado DPM;  Location: [facility];  Service: Podiatry    FL REMOVAL IMPLANT DEEP Left 7/28/2022    Procedure: REMOVAL HARDWARE FOOT;  Surgeon: Houston Mercado DPM;  Location: [facility];  Service: Podiatry   [3]   Family History  Problem Relation Name Age of Onset    Thyroid cancer Sister      Autism Brother      Diabetes type II Maternal Grandmother     [4]   Social History  Tobacco Use    Smoking status: Every Day     Current packs/day: 1.00     Types: Cigarettes    Smokeless tobacco: Never    Tobacco comments:     cutting back   Vaping Use    Vaping status: Some Days    Substances: Nicotine, Flavoring   Substance Use Topics    Alcohol use: Yes     Comment: Occasional    Drug use: Yes     Types: Marijuana        Andrzej Corrales DO  07/07/25 1343     Environmental allergies     PONV (postoperative nausea and vomiting)     PTSD (post-traumatic stress disorder)    [2]   Past Surgical History:  Procedure Laterality Date    SD ARTHRD MIDTARSL/TARSOMETATARSAL MULT/TRANSVRS Left 10/13/2022    Procedure: ARTHRODESIS / FUSION FOOT;  Surgeon: Houston Mercado DPM;  Location: [facility];  Service: Podiatry    SD OPEN TREATMENT TARSOMETATARSAL JOINT DISLOCATION Left 5/6/2022    Procedure: ORIF OF LEFT FOOT UTILIZING PLATES AND SCREW AS NECESSARY;  Surgeon: Houston Mercado DPM;  Location: [facility];  Service: Podiatry    SD REMOVAL IMPLANT DEEP Left 7/28/2022    Procedure: REMOVAL HARDWARE FOOT;  Surgeon: Houston Mercado DPM;  Location: [facility];  Service: Podiatry   [3]   Family History  Problem Relation Name Age of Onset    Thyroid cancer Sister      Autism Brother      Diabetes type II Maternal Grandmother     [4]   Social History  Tobacco Use    Smoking status: Every Day     Current packs/day: 1.00     Types: Cigarettes    Smokeless tobacco: Never    Tobacco comments:     cutting back   Vaping Use    Vaping status: Some Days    Substances: Nicotine, Flavoring   Substance Use Topics    Alcohol use: Yes     Comment: Occasional    Drug use: Yes     Types: Marijuana        Anrdzej Corrales DO  07/31/25 1501

## 2025-07-07 NOTE — ASSESSMENT & PLAN NOTE
Orders:  •  Ambulatory Referral to Gastroenterology  •  Colonoscopy; Future  •  polyethylene glycol (Golytely) 4000 mL solution; Take 4,000 mL by mouth once for 1 dose Take 4000 mL by mouth once for 1 dose. Use as directed  •  ondansetron (ZOFRAN) 4 mg tablet; Take 1 tablet (4 mg total) by mouth every 8 (eight) hours as needed for nausea or vomiting

## 2025-07-07 NOTE — ASSESSMENT & PLAN NOTE
Orders:  •  Ambulatory Referral to Gastroenterology  •  EGD; Future  •  ondansetron (ZOFRAN) 4 mg tablet; Take 1 tablet (4 mg total) by mouth every 8 (eight) hours as needed for nausea or vomiting

## 2025-07-07 NOTE — PATIENT INSTRUCTIONS
Proceed with EGD/Colonoscopy.   Proceed with stool studies as ordered.  Proceed with blood work as ordered.   Proceed with RUQ U/S for liver as ordered.   Discussed recommendations in regards to fatty liver including:   Strict control of contributing comorbidities (obesity, prediabetes/diabetes, hypertension, and hypertriglyceridemia).  Weight loss of approx 10-15% of patient's current body weight over a period of 6-12 months through low fat diet and cardiovascular exercise as tolerated.   Limiting alcohol consumption, preferably complete abstinence.  Monitor hepatic function every 6 months with routine labs.   Continue to use Zofran as needed for N/V.   Start Protonix 40 mg, 1 pill daily in AM prior to a meal.   Start Famotidine 40 mg, 1 pill daily at bed time.   Work on decreasing vaping/smoking by at least 25%, slowly over time.   Schedule a f/u OV after procedures.

## 2025-07-07 NOTE — ASSESSMENT & PLAN NOTE
While the patient was in the office today, I discussed with them that I do feel that it would be beneficial to proceed with stool studies to evaluate for any underlying infectious or parasitic etiology as well as ruling out the possibility of EPI.  I advised the patient that once we receive the results of the stool studies, our office will contact them to review the results and discuss any other treatment plan recommendations as appropriate.  The patient was agreeable and verbalized an understanding.     Orders:  •  Colonoscopy; Future  •  polyethylene glycol (Golytely) 4000 mL solution; Take 4,000 mL by mouth once for 1 dose Take 4000 mL by mouth once for 1 dose. Use as directed  •  Calprotectin,Fecal; Future  •  Clostridioides difficile toxin by PCR with EIA; Future  •  IgA; Future  •  Ova and parasite examination; Future  •  Stool Enteric Bacterial Panel by PCR; Future  •  Giardia/Cryptosporidium EIA; Future  •  Pancreatic elastase, fecal; Future

## 2025-07-07 NOTE — ASSESSMENT & PLAN NOTE
I discussed with the patient that with their current symptoms and exam findings, I feel it would be beneficial to proceed with an EGD to further evaluate any underlying etiology that could explain their symptoms, with differential diagnoses that could include but are not limited to; GERD, gastric ulcers, Del Castillo's esophagus, EOE, H. pylori, etc. They were agreeable and verbalized and understanding.     I discussed the risks of procedure with the patient including, but not limited to: bleeding, infection, sore throat, and perforation. The patient gave verbal understanding and is agreeable to proceed. Patient's questions were answered to the best of my ability and until they verbalized an understanding.     In the meantime to try to address some of her symptoms I did discuss with the patient that I do feel a PPI would be beneficial but since she has previously tried and failed omeprazole, we will try Protonix 40 mg, 1 p.o. daily in the a.m. prior to her first meal.  We will also start her on famotidine 40 mg at bedtime.  She is to continue these medications until her follow-up office visit after her EGD and at that point in time we will reevaluate her medication regimen and treatment plan.  The patient was agreeable and verbalized an understanding.    Encouraged the patient to avoid acidic or trigger foods including alcohol as much as possible.  Encouraged the patient to consider purchasing a wedge pillow to help prevent reflux symptoms while sleeping.  Encouraged the patient not to lay down or sleep for at least 3 to 4 hours after eating and to try to avoid late night snacking.    Encouraged the patient to work on decreasing both her smoking and vaping by at least 25 to 50%, slowly over time, with an eventual goal of complete cessation if possible.  Orders:  •  Ambulatory Referral to Gastroenterology  •  EGD; Future  •  ondansetron (ZOFRAN) 4 mg tablet; Take 1 tablet (4 mg total) by mouth every 8 (eight) hours as  needed for nausea or vomiting

## 2025-07-07 NOTE — ASSESSMENT & PLAN NOTE
Discussed recommendations in regards to fatty liver including:   Strict control of contributing comorbidities (obesity, prediabetes/diabetes, hypertension, and hypertriglyceridemia).  Weight loss of approx 10-15% of patient's current body weight over a period of 6-12 months through low fat diet and cardiovascular exercise as tolerated.   Limiting alcohol consumption, preferably complete abstinence.  Monitor hepatic function every 6 months with routine labs.     Orders:  •  US right upper quadrant; Future  •  Protime-INR; Future  •  Bilirubin, direct; Future

## 2025-07-07 NOTE — PROGRESS NOTES
Name: Josephine Mckay      : 2005      MRN: [id number]  Encounter Provider: CINDI Alfredo  Encounter Date: 2025   Encounter department: [facility] [place]  :  Assessment & Plan  Nausea and vomiting, unspecified vomiting type  I discussed with the patient that with their current symptoms and exam findings, I feel it would be beneficial to proceed with an EGD to further evaluate any underlying etiology that could explain their symptoms, with differential diagnoses that could include but are not limited to; GERD, gastric ulcers, Del Castillo's esophagus, EOE, H. pylori, etc. They were agreeable and verbalized and understanding.     I discussed the risks of procedure with the patient including, but not limited to: bleeding, infection, sore throat, and perforation. The patient gave verbal understanding and is agreeable to proceed. Patient's questions were answered to the best of my ability and until they verbalized an understanding.     In the meantime to try to address some of her symptoms I did discuss with the patient that I do feel a PPI would be beneficial but since she has previously tried and failed omeprazole, we will try Protonix 40 mg, 1 p.o. daily in the a.m. prior to her first meal.  We will also start her on famotidine 40 mg at bedtime.  She is to continue these medications until her follow-up office visit after her EGD and at that point in time we will reevaluate her medication regimen and treatment plan.  The patient was agreeable and verbalized an understanding.    Encouraged the patient to avoid acidic or trigger foods including alcohol as much as possible.  Encouraged the patient to consider purchasing a wedge pillow to help prevent reflux symptoms while sleeping.  Encouraged the patient not to lay down or sleep for at least 3 to 4 hours after eating and to try to avoid late night snacking.    Encouraged the patient to work on decreasing both her smoking and  vaping by at least 25 to 50%, slowly over time, with an eventual goal of complete cessation if possible.  Orders:  •  Ambulatory Referral to Gastroenterology  •  EGD; Future  •  ondansetron (ZOFRAN) 4 mg tablet; Take 1 tablet (4 mg total) by mouth every 8 (eight) hours as needed for nausea or vomiting    Dyspepsia  Orders:  •  Ambulatory Referral to Gastroenterology  •  EGD; Future  •  ondansetron (ZOFRAN) 4 mg tablet; Take 1 tablet (4 mg total) by mouth every 8 (eight) hours as needed for nausea or vomiting    BRBPR (bright red blood per rectum)  While the patient and her mother were in the office today, I discussed with the patient and with her bright red blood per rectum and irritable bowel syndrome symptoms, I do feel would be beneficial to proceed with a colonoscopy at the same time as the EGD to evaluate for any other underlying etiology, including IBD as a source of her symptoms.  The patient was agreeable and verbalized an understanding.    I obtained informed verbal consent from the patient. The risks/benefits/alternatives of the Colonoscopy procedure were discussed with the patient. Risks included, but not limited to, infection, bleeding, perforation, injury to organs in the abdomen, missed lesion, and incomplete procedure were discussed. The patient gave verbal understanding and is agreeable to proceed. Bowel prep instructions were reviewed and given as ordered. Patient's questions were answered to the best of my ability and until they verbalized an understanding.      Encouraged the patient to continue to try to drink at least 32 to 64 ounces of water daily.  Orders:  •  Colonoscopy; Future  •  polyethylene glycol (Golytely) 4000 mL solution; Take 4,000 mL by mouth once for 1 dose Take 4000 mL by mouth once for 1 dose. Use as directed    Irritable bowel syndrome with diarrhea  While the patient was in the office today, I discussed with them that I do feel that it would be beneficial to proceed with stool  studies to evaluate for any underlying infectious or parasitic etiology as well as ruling out the possibility of EPI.  I advised the patient that once we receive the results of the stool studies, our office will contact them to review the results and discuss any other treatment plan recommendations as appropriate.  The patient was agreeable and verbalized an understanding.     Orders:  •  Colonoscopy; Future  •  polyethylene glycol (Golytely) 4000 mL solution; Take 4,000 mL by mouth once for 1 dose Take 4000 mL by mouth once for 1 dose. Use as directed  •  Calprotectin,Fecal; Future  •  Clostridioides difficile toxin by PCR with EIA; Future  •  IgA; Future  •  Ova and parasite examination; Future  •  Stool Enteric Bacterial Panel by PCR; Future  •  Giardia/Cryptosporidium EIA; Future  •  Pancreatic elastase, fecal; Future    Diarrhea of presumed infectious origin  Orders:  •  Calprotectin,Fecal; Future  •  Clostridioides difficile toxin by PCR with EIA; Future  •  IgA; Future  •  Ova and parasite examination; Future  •  Stool Enteric Bacterial Panel by PCR; Future  •  Giardia/Cryptosporidium EIA; Future  •  Pancreatic elastase, fecal; Future    Abdominal discomfort, generalized  Orders:  •  Ambulatory Referral to Gastroenterology  •  Colonoscopy; Future  •  polyethylene glycol (Golytely) 4000 mL solution; Take 4,000 mL by mouth once for 1 dose Take 4000 mL by mouth once for 1 dose. Use as directed  •  ondansetron (ZOFRAN) 4 mg tablet; Take 1 tablet (4 mg total) by mouth every 8 (eight) hours as needed for nausea or vomiting    Steatosis of liver  Discussed recommendations in regards to fatty liver including:   Strict control of contributing comorbidities (obesity, prediabetes/diabetes, hypertension, and hypertriglyceridemia).  Weight loss of approx 10-15% of patient's current body weight over a period of 6-12 months through low fat diet and cardiovascular exercise as tolerated.   Limiting alcohol consumption,  preferably complete abstinence.  Monitor hepatic function every 6 months with routine labs.     Orders:  •  US right upper quadrant; Future  •  Protime-INR; Future  •  Bilirubin, direct; Future    The patient is to schedule a follow up office visit after her EGD and colonoscopy, but understands to call or contact our offices with any issues before then or as needed.     History of Present Illness   Josephine Mckay is a 19 y.o. female who presents today with her mother for her initial consultation and evaluation regarding her nausea and vomiting, dyspepsia, bright red blood per rectum, irritable bowel syndrome with diarrhea, generalized abdominal discomfort, and steatosis of the liver.  The patient reports that for over a year she has nausea, usually followed by vomiting every time she eats as well as chronic epigastric and umbilical pain but also reports generalized abdominal pain as well.  She reports she was previously started on omeprazole and Zofran as needed and although she took the omeprazole and says she was out of it she did not find it helpful but does find the Zofran helpful at times.    She also reports chronic irritable bowel syndrome symptoms with diarrhea and can have as many as 10-15 bowel movements a day.  She is also reporting intermittent episodes of bright red blood per rectum with both wiping and mixed in with the stool.    The patient currently denies any reflux, dysphagia, decreased appetite, or unplanned weight loss. Water Intake: Adequate amounts daily.     The patient currently reports that they have a BM 10-15 x daily and reports that it is sometimes relieving, without any constipation, straining, melena or bloody stools. [place]: 4-7. Last BM: Today. Flatus: Yes, excessive.    PMH/PSH:   Abdominal/Chest Surgery: Denied  Colon Cancer: Denied  Any Cancer: Denied  Pre-Cancerous Polyps: N/A  Crohn's: N/A  Ulcerative Colitis: N/A  Del Castillo's Esophagus: N/A  Celiac Disease: N/A  Liver Disease:  "Fatty Liver    Tobacco/Vapin PPD at times, vaping in between  ETOH: Occasionally  Marijuana: Frequently  Illicit Drug Use: Denied    FH:  Colon Cancer: Denied  Any Cancer: Maternal Grandmother, Grandfather: Lung. Sister: skin, breast.   Family Members with Pre-Cancerous Polyps: Denied  Crohn's: Denied  Ulcerative Colitis: Maternal Grandmother  Celiac Disease: Denied  Liver Disease: Maternal Grandfather    GI Meds: None  Daily NSAID Use: Denied  Daily Tylenol Use: Denied  Any New Meds: Omeprazole & Zofran    Imaging: (21) CT of the abdomen and pelvis without contrast: Mild steatosis, otherwise normal.    Endoscopy History: EGD: (None):     COLONOSCOPY: (None):   HPI  History obtained from: patient and patient's parent  Review of Systems A complete review of systems is negative other than that noted above in the HPI.         Objective   /63 (BP Location: Left arm, Patient Position: Sitting, Cuff Size: Standard)   Pulse 92   Temp 97.6 °F (36.4 °C) (Temporal)   Ht 5' 2\" (1.575 m)   Wt (!) 145 kg (318 lb 9.6 oz)   SpO2 98%   BMI 58.27 kg/m²     Physical Exam   Sclera without icterus and benign. Lung sounds decreased but clear to auscultation b/l. Normal S1 & S2 upon exam. Abdomen is large, round, obese, soft, with mild to moderate tenderness on upon exam in the left upper quadrant and epigastric region, with mild guarding, but without any significant rebound tenderness noted upon exam, with very faint bowel sounds x 4.  Trace edema noted of the b/l lower extremities upon exam today. Skin is non-icteric.     Lab Results: I personally reviewed relevant lab results.             "

## 2025-07-07 NOTE — ASSESSMENT & PLAN NOTE
Orders:  •  Calprotectin,Fecal; Future  •  Clostridioides difficile toxin by PCR with EIA; Future  •  IgA; Future  •  Ova and parasite examination; Future  •  Stool Enteric Bacterial Panel by PCR; Future  •  Giardia/Cryptosporidium EIA; Future  •  Pancreatic elastase, fecal; Future

## 2025-07-07 NOTE — DISCHARGE INSTRUCTIONS
Thank you for visiting the Emergency Department today.    No definitive broken bone and no signs of dislocation.   Suspect sprain only  Use wrist/thumb splint/brace as needed while symptoms persist.   Follow up with orthopedics as needed  Return here for severe symptoms or other concerns

## 2025-07-15 ENCOUNTER — OFFICE VISIT (OUTPATIENT)
Dept: FAMILY MEDICINE CLINIC | Facility: CLINIC | Age: 20
End: 2025-07-15

## 2025-07-15 VITALS
SYSTOLIC BLOOD PRESSURE: 116 MMHG | BODY MASS INDEX: 53.92 KG/M2 | WEIGHT: 293 LBS | HEART RATE: 101 BPM | HEIGHT: 62 IN | DIASTOLIC BLOOD PRESSURE: 84 MMHG | OXYGEN SATURATION: 97 % | TEMPERATURE: 97 F

## 2025-07-15 DIAGNOSIS — K76.0 STEATOSIS OF LIVER: ICD-10-CM

## 2025-07-15 DIAGNOSIS — J45.20 MILD INTERMITTENT ASTHMA WITHOUT COMPLICATION: ICD-10-CM

## 2025-07-15 DIAGNOSIS — F33.2 SEVERE EPISODE OF RECURRENT MAJOR DEPRESSIVE DISORDER, WITHOUT PSYCHOTIC FEATURES (HCC): ICD-10-CM

## 2025-07-15 DIAGNOSIS — Z00.00 ANNUAL PHYSICAL EXAM: Primary | ICD-10-CM

## 2025-07-15 DIAGNOSIS — Z00.6 ENCOUNTER FOR EXAMINATION FOR NORMAL COMPARISON OR CONTROL IN CLINICAL RESEARCH PROGRAM: ICD-10-CM

## 2025-07-15 DIAGNOSIS — F90.2 ADHD (ATTENTION DEFICIT HYPERACTIVITY DISORDER), COMBINED TYPE: ICD-10-CM

## 2025-07-15 DIAGNOSIS — K58.0 IRRITABLE BOWEL SYNDROME WITH DIARRHEA: ICD-10-CM

## 2025-07-15 DIAGNOSIS — J40 BRONCHITIS: ICD-10-CM

## 2025-07-15 DIAGNOSIS — G89.4 CHRONIC PAIN SYNDROME: ICD-10-CM

## 2025-07-15 PROCEDURE — PBNCHG PB NO CHARGE PLACEHOLDER: Performed by: FAMILY MEDICINE

## 2025-07-15 RX ORDER — VORTIOXETINE 10 MG/1
1 TABLET, FILM COATED ORAL DAILY
COMMUNITY
Start: 2025-07-07

## 2025-07-15 RX ORDER — ALBUTEROL SULFATE 90 UG/1
2 INHALANT RESPIRATORY (INHALATION) EVERY 6 HOURS PRN
Qty: 18 G | Refills: 0 | Status: SHIPPED | OUTPATIENT
Start: 2025-07-15

## 2025-07-15 NOTE — PROGRESS NOTES
"Name: Josephine Mckay      : 2005      MRN: [id number]  Encounter Provider: Conrad Hernandez DO  Encounter Date: 7/15/2025   Encounter department: [place] [facility]  :  Assessment & Plan  Annual physical exam         Mild intermittent asthma without complication         Irritable bowel syndrome with diarrhea  The patient is followed by gastroenterology.  Was advised to have an EGD.  Zofran was prescribed.  Gastroenterology also advised a colonoscopy.  Stool studies were ordered.       Steatosis of liver  Gastroenterology is following.  They discussed weight loss, control of all risk factors including any signs of prediabetes, hypertension or hypertriglyceridemia.  Limiting alcohol consumption preferably complete abstinence and monitoring liver function tests every 6 months       Severe episode of recurrent major depressive disorder, without psychotic features (HCC)           ADHD (attention deficit hyperactivity disorder), combined type         Chronic pain syndrome         Bronchitis                History of Present Illness   The patient is a pleasant 19-year-old female who presents today for her annual physical examination.      Review of Systems   Constitutional:  Negative for chills and fever.   HENT:  Negative for ear pain and sore throat.    Eyes:  Negative for pain and visual disturbance.   Respiratory:  Negative for cough and shortness of breath.    Cardiovascular:  Negative for chest pain and palpitations.   Gastrointestinal:  Negative for abdominal pain and vomiting.   Genitourinary:  Negative for dysuria and hematuria.   Musculoskeletal:  Negative for arthralgias and back pain.   Skin:  Negative for color change and rash.   Neurological:  Negative for seizures and syncope.   Psychiatric/Behavioral: Negative.     All other systems reviewed and are negative.      Objective   /84   Pulse 101   Temp (!) 97 °F (36.1 °C)   Ht 5' 2\" (1.575 m)   Wt (!) 144 kg (317 lb 6.4 oz)   SpO2 97%  "  BMI 58.05 kg/m²      Physical Exam  Vitals and nursing note reviewed.   Constitutional:       General: She is not in acute distress.     Appearance: She is well-developed.   HENT:      Head: Normocephalic and atraumatic.      Mouth/Throat:      Mouth: Mucous membranes are moist.     Eyes:      Conjunctiva/sclera: Conjunctivae normal.       Cardiovascular:      Rate and Rhythm: Normal rate and regular rhythm.      Heart sounds: No murmur heard.  Pulmonary:      Effort: Pulmonary effort is normal. No respiratory distress.      Breath sounds: Normal breath sounds.   Abdominal:      Palpations: Abdomen is soft.      Tenderness: There is no abdominal tenderness.     Musculoskeletal:         General: No swelling.      Cervical back: Neck supple.     Skin:     General: Skin is warm and dry.      Capillary Refill: Capillary refill takes less than 2 seconds.     Neurological:      General: No focal deficit present.      Mental Status: She is alert and oriented to person, place, and time.     Psychiatric:         Mood and Affect: Mood normal.         Behavior: Behavior normal.         Thought Content: Thought content normal.

## 2025-07-28 ENCOUNTER — ANESTHESIA (OUTPATIENT)
Dept: ANESTHESIOLOGY | Facility: HOSPITAL | Age: 20
End: 2025-07-28

## 2025-07-28 ENCOUNTER — ANESTHESIA EVENT (OUTPATIENT)
Dept: ANESTHESIOLOGY | Facility: HOSPITAL | Age: 20
End: 2025-07-28

## 2025-08-01 ENCOUNTER — ANESTHESIA EVENT (OUTPATIENT)
Dept: PERIOP | Facility: HOSPITAL | Age: 20
End: 2025-08-01
Payer: COMMERCIAL

## 2025-08-01 ENCOUNTER — ANESTHESIA (OUTPATIENT)
Dept: PERIOP | Facility: HOSPITAL | Age: 20
End: 2025-08-01
Payer: COMMERCIAL

## 2025-08-01 ENCOUNTER — HOSPITAL ENCOUNTER (OUTPATIENT)
Dept: PERIOP | Facility: HOSPITAL | Age: 20
Setting detail: OUTPATIENT SURGERY
Discharge: HOME | End: 2025-08-01
Attending: NURSE PRACTITIONER
Payer: COMMERCIAL

## 2025-08-01 VITALS
RESPIRATION RATE: 18 BRPM | BODY MASS INDEX: 53.92 KG/M2 | HEART RATE: 74 BPM | TEMPERATURE: 97.3 F | DIASTOLIC BLOOD PRESSURE: 62 MMHG | HEIGHT: 62 IN | WEIGHT: 293 LBS | OXYGEN SATURATION: 99 % | SYSTOLIC BLOOD PRESSURE: 124 MMHG

## 2025-08-01 DIAGNOSIS — K58.0 IRRITABLE BOWEL SYNDROME WITH DIARRHEA: ICD-10-CM

## 2025-08-01 DIAGNOSIS — R10.84 ABDOMINAL DISCOMFORT, GENERALIZED: ICD-10-CM

## 2025-08-01 DIAGNOSIS — K62.5 BRBPR (BRIGHT RED BLOOD PER RECTUM): ICD-10-CM

## 2025-08-01 DIAGNOSIS — R11.2 NAUSEA AND VOMITING, UNSPECIFIED VOMITING TYPE: ICD-10-CM

## 2025-08-01 DIAGNOSIS — R10.13 DYSPEPSIA: ICD-10-CM

## 2025-08-01 LAB
EXT PREGNANCY TEST URINE: NEGATIVE
EXT. CONTROL: NORMAL

## 2025-08-01 PROCEDURE — 43239 EGD BIOPSY SINGLE/MULTIPLE: CPT | Performed by: INTERNAL MEDICINE

## 2025-08-01 PROCEDURE — 45380 COLONOSCOPY AND BIOPSY: CPT | Performed by: INTERNAL MEDICINE

## 2025-08-01 PROCEDURE — 81025 URINE PREGNANCY TEST: CPT | Performed by: ANESTHESIOLOGY

## 2025-08-01 PROCEDURE — 88305 TISSUE EXAM BY PATHOLOGIST: CPT | Performed by: PATHOLOGY

## 2025-08-01 RX ORDER — PROPOFOL 10 MG/ML
INJECTION, EMULSION INTRAVENOUS AS NEEDED
Status: DISCONTINUED | OUTPATIENT
Start: 2025-08-01 | End: 2025-08-01

## 2025-08-01 RX ORDER — LIDOCAINE HYDROCHLORIDE 20 MG/ML
INJECTION, SOLUTION EPIDURAL; INFILTRATION; INTRACAUDAL; PERINEURAL AS NEEDED
Status: DISCONTINUED | OUTPATIENT
Start: 2025-08-01 | End: 2025-08-01

## 2025-08-01 RX ORDER — ALBUTEROL SULFATE 0.83 MG/ML
2.5 SOLUTION RESPIRATORY (INHALATION) ONCE AS NEEDED
Status: DISCONTINUED | OUTPATIENT
Start: 2025-08-01 | End: 2025-08-05 | Stop reason: HOSPADM

## 2025-08-01 RX ORDER — SODIUM CHLORIDE, SODIUM LACTATE, POTASSIUM CHLORIDE, CALCIUM CHLORIDE 600; 310; 30; 20 MG/100ML; MG/100ML; MG/100ML; MG/100ML
INJECTION, SOLUTION INTRAVENOUS CONTINUOUS PRN
Status: DISCONTINUED | OUTPATIENT
Start: 2025-08-01 | End: 2025-08-01

## 2025-08-01 RX ORDER — SODIUM CHLORIDE, SODIUM LACTATE, POTASSIUM CHLORIDE, CALCIUM CHLORIDE 600; 310; 30; 20 MG/100ML; MG/100ML; MG/100ML; MG/100ML
125 INJECTION, SOLUTION INTRAVENOUS CONTINUOUS
Status: DISCONTINUED | OUTPATIENT
Start: 2025-08-01 | End: 2025-08-05 | Stop reason: HOSPADM

## 2025-08-01 RX ORDER — ONDANSETRON 2 MG/ML
4 INJECTION INTRAMUSCULAR; INTRAVENOUS ONCE AS NEEDED
Status: DISCONTINUED | OUTPATIENT
Start: 2025-08-01 | End: 2025-08-05 | Stop reason: HOSPADM

## 2025-08-01 RX ADMIN — PROPOFOL 100 MG: 10 INJECTION, EMULSION INTRAVENOUS at 10:56

## 2025-08-01 RX ADMIN — PROPOFOL 50 MG: 10 INJECTION, EMULSION INTRAVENOUS at 11:08

## 2025-08-01 RX ADMIN — SODIUM CHLORIDE, SODIUM LACTATE, POTASSIUM CHLORIDE, AND CALCIUM CHLORIDE 125 ML/HR: .6; .31; .03; .02 INJECTION, SOLUTION INTRAVENOUS at 10:08

## 2025-08-01 RX ADMIN — LIDOCAINE HYDROCHLORIDE 100 MG: 20 INJECTION, SOLUTION EPIDURAL; INFILTRATION; INTRACAUDAL at 10:52

## 2025-08-01 RX ADMIN — PROPOFOL 100 MG: 10 INJECTION, EMULSION INTRAVENOUS at 11:04

## 2025-08-01 RX ADMIN — PROPOFOL 100 MG: 10 INJECTION, EMULSION INTRAVENOUS at 10:52

## 2025-08-01 RX ADMIN — PROPOFOL 100 MG: 10 INJECTION, EMULSION INTRAVENOUS at 11:00

## 2025-08-01 RX ADMIN — SODIUM CHLORIDE, SODIUM LACTATE, POTASSIUM CHLORIDE, AND CALCIUM CHLORIDE: .6; .31; .03; .02 INJECTION, SOLUTION INTRAVENOUS at 10:45

## 2025-08-01 RX ADMIN — PROPOFOL 100 MG: 10 INJECTION, EMULSION INTRAVENOUS at 10:53

## 2025-08-06 PROBLEM — R19.7 DIARRHEA OF PRESUMED INFECTIOUS ORIGIN: Status: RESOLVED | Noted: 2025-07-07 | Resolved: 2025-08-06

## 2025-08-06 PROCEDURE — 88305 TISSUE EXAM BY PATHOLOGIST: CPT | Performed by: PATHOLOGY

## 2025-08-08 ENCOUNTER — OCCMED (OUTPATIENT)
Dept: URGENT CARE | Facility: MEDICAL CENTER | Age: 20
End: 2025-08-08

## (undated) DEVICE — CURITY STRETCH BANDAGE: Brand: CURITY

## (undated) DEVICE — 10FR FRAZIER SUCTION HANDLE: Brand: CARDINAL HEALTH

## (undated) DEVICE — SINGLE PORT MANIFOLD: Brand: NEPTUNE 2

## (undated) DEVICE — NON-STERILE REUSABLE TOURNIQUET CUFF SINGLE BLADDER, DUAL PORT AND QUICK CONNECT CONNECTOR: Brand: COLOR CUFF

## (undated) DEVICE — NON-THREADED KWIRE
Type: IMPLANTABLE DEVICE | Site: FOOT | Status: NON-FUNCTIONAL
Brand: MINI
Removed: 2022-10-13

## (undated) DEVICE — SPLINT 4 X 30 IN PRECUT SYNTHETIC

## (undated) DEVICE — INTENDED FOR TISSUE SEPARATION, AND OTHER PROCEDURES THAT REQUIRE A SHARP SURGICAL BLADE TO PUNCTURE OR CUT.: Brand: BARD-PARKER ® CARBON RIB-BACK BLADES

## (undated) DEVICE — SUT VICRYL 4-0 PS-2 18 IN J496G

## (undated) DEVICE — CURITY NON-ADHERENT STRIPS: Brand: CURITY

## (undated) DEVICE — TUBING SUCTION 5MM X 12 FT

## (undated) DEVICE — ACE WRAP 4 IN UNSTERILE

## (undated) DEVICE — BETHLEHEM UNIVERSAL  MIONR EXT: Brand: CARDINAL HEALTH

## (undated) DEVICE — ACE WRAP 4 IN STERILE

## (undated) DEVICE — GAUZE SPONGES,16 PLY: Brand: CURITY

## (undated) DEVICE — ACE WRAP 6 IN STERILE

## (undated) DEVICE — SUT ETHILON 3-0 PS-1 18 IN 1663H

## (undated) DEVICE — ASTOUND IMPERVIOUS SURGICAL GOWN: Brand: CONVERTORS

## (undated) DEVICE — SCD SEQUENTIAL COMPRESSION COMFORT SLEEVE MEDIUM KNEE LENGTH: Brand: KENDALL SCD

## (undated) DEVICE — DRILL BIT, AO, SCALED: Brand: VARIAX

## (undated) DEVICE — OCCLUSIVE GAUZE STRIP,3% BISMUTH TRIBROMOPHENATE IN PETROLATUM BLEND: Brand: XEROFORM

## (undated) DEVICE — CUFF TOURNIQUET 18 X 4 IN QUICK CONNECT DISP 1 BLADDER

## (undated) DEVICE — CHLORAPREP HI-LITE 26ML ORANGE

## (undated) DEVICE — SUT MONOCRYL 4-0 PS-2 27 IN Y426H

## (undated) DEVICE — PADDING CAST 4 IN  COTTON STRL

## (undated) DEVICE — GLOVE SRG LF STRL BGL SKNSNS 7 PF

## (undated) DEVICE — SYRINGE 10ML LL

## (undated) DEVICE — WEBRIL 6 IN UNSTERILE

## (undated) DEVICE — STEINMANN PIN, SMOOTH
Type: IMPLANTABLE DEVICE | Site: FOOT | Status: NON-FUNCTIONAL
Brand: VARIAX
Removed: 2022-10-13

## (undated) DEVICE — SPONGE PVP SCRUB WING STERILE

## (undated) DEVICE — CANNULATED COUNTERSINK: Brand: FIXOS

## (undated) DEVICE — SYRINGE 10ML LL CONTROL TOP

## (undated) DEVICE — STEINMANN PIN, SMOOTH
Type: IMPLANTABLE DEVICE | Site: FOOT | Status: NON-FUNCTIONAL
Brand: VARIAX
Removed: 2022-05-06

## (undated) DEVICE — ZIMMER® STERILE DISPOSABLE TOURNIQUET CUFF, DUAL PORT, SINGLE BLADDER, 18 IN. (46 CM)

## (undated) DEVICE — POV-IOD SOLUTION 4OZ BT

## (undated) DEVICE — INTENDED FOR TISSUE SEPARATION, AND OTHER PROCEDURES THAT REQUIRE A SHARP SURGICAL BLADE TO PUNCTURE OR CUT.: Brand: BARD-PARKER SAFETY BLADES SIZE 15, STERILE

## (undated) DEVICE — BANDAGE, ESMARK LF STR 6"X9' (20/CS): Brand: CYPRESS

## (undated) DEVICE — Device

## (undated) DEVICE — SUT VICRYL 0 REEL 54 IN J287G

## (undated) DEVICE — NEEDLE 25G X 1 1/2

## (undated) DEVICE — PADDING CAST 2IN COTTON STRL

## (undated) DEVICE — ABDOMINAL PAD: Brand: DERMACEA

## (undated) DEVICE — PREMIUM DRY TRAY LF: Brand: MEDLINE INDUSTRIES, INC.

## (undated) DEVICE — SUT VICRYL 3-0 SH 27 IN J416H

## (undated) DEVICE — GLOVE INDICATOR PI UNDERGLOVE SZ 7 BLUE

## (undated) DEVICE — SPLINT 5 X 30 IN XFAST SET PLASTER

## (undated) DEVICE — UNTHREADED GUIDE WIRE
Type: IMPLANTABLE DEVICE | Site: FOOT | Status: NON-FUNCTIONAL
Brand: FIXOS
Removed: 2022-05-06

## (undated) DEVICE — DRESSING XEROFORM 5 X 9

## (undated) DEVICE — STRETCH BANDAGE: Brand: CURITY

## (undated) DEVICE — PLUMEPEN PRO 10FT

## (undated) DEVICE — THREE-QUARTER SHEET: Brand: CONVERTORS

## (undated) DEVICE — BANDAGE, ESMARK LF STR 4"X9'(20/CS): Brand: CYPRESS

## (undated) DEVICE — PADDING CAST 6IN COTTON STRL

## (undated) DEVICE — DRAPE C-ARM X-RAY

## (undated) DEVICE — STANDARD SURGICAL GOWN, L: Brand: CONVERTORS

## (undated) DEVICE — UNTHREADED GUIDE WIRE
Type: IMPLANTABLE DEVICE | Site: FOOT | Status: NON-FUNCTIONAL
Brand: FIXOS
Removed: 2022-07-28

## (undated) DEVICE — PAD CAST 6 IN COTTON NON STERILE

## (undated) DEVICE — SUT VICRYL 3-0 REEL 54 IN J285G

## (undated) DEVICE — ACE WRAP 6 IN UNSTERILE